# Patient Record
Sex: FEMALE | Race: WHITE | Employment: OTHER | URBAN - METROPOLITAN AREA
[De-identification: names, ages, dates, MRNs, and addresses within clinical notes are randomized per-mention and may not be internally consistent; named-entity substitution may affect disease eponyms.]

---

## 2017-03-15 ENCOUNTER — OFFICE VISIT (OUTPATIENT)
Dept: ORTHOPEDIC SURGERY | Age: 77
End: 2017-03-15

## 2017-03-15 VITALS
BODY MASS INDEX: 25.76 KG/M2 | DIASTOLIC BLOOD PRESSURE: 70 MMHG | HEIGHT: 62 IN | HEART RATE: 78 BPM | WEIGHT: 139.99 LBS | SYSTOLIC BLOOD PRESSURE: 130 MMHG

## 2017-03-15 DIAGNOSIS — M20.42 HAMMER TOE OF LEFT FOOT: ICD-10-CM

## 2017-03-15 DIAGNOSIS — L84 FOOT CALLUS: Primary | ICD-10-CM

## 2017-03-15 PROCEDURE — 11055 PARING/CUTG B9 HYPRKER LES 1: CPT | Performed by: PODIATRIST

## 2017-03-15 PROCEDURE — 99202 OFFICE O/P NEW SF 15 MIN: CPT | Performed by: PODIATRIST

## 2017-03-31 ENCOUNTER — HOSPITAL ENCOUNTER (OUTPATIENT)
Dept: MAMMOGRAPHY | Age: 77
Discharge: HOME | End: 2017-03-31
Attending: INTERNAL MEDICINE | Admitting: INTERNAL MEDICINE

## 2017-03-31 DIAGNOSIS — Z12.31 ENCOUNTER FOR SCREENING MAMMOGRAM FOR BREAST CANCER: ICD-10-CM

## 2017-05-10 ENCOUNTER — TELEPHONE (OUTPATIENT)
Dept: ORTHOPEDIC SURGERY | Age: 77
End: 2017-05-10

## 2017-05-10 ENCOUNTER — HOSPITAL ENCOUNTER (OUTPATIENT)
Dept: OTHER | Age: 77
Discharge: HOME | End: 2017-05-10
Attending: ORTHOPAEDIC SURGERY | Admitting: ORTHOPAEDIC SURGERY

## 2017-05-10 ENCOUNTER — OFFICE VISIT (OUTPATIENT)
Dept: ORTHOPEDIC SURGERY | Age: 77
End: 2017-05-10

## 2017-05-10 VITALS — HEIGHT: 62 IN | WEIGHT: 139.99 LBS | BODY MASS INDEX: 25.76 KG/M2

## 2017-05-10 DIAGNOSIS — G56.01 CARPAL TUNNEL SYNDROME OF RIGHT WRIST: ICD-10-CM

## 2017-05-10 DIAGNOSIS — M79.641 HAND PAIN, RIGHT: Primary | ICD-10-CM

## 2017-05-10 LAB
ANION GAP SERPL CALCULATED.3IONS-SCNC: 17 MMOL/L (ref 3–16)
BASOPHILS ABSOLUTE: 0 K/UL (ref 0–0.2)
BASOPHILS RELATIVE PERCENT: 0.7 %
BUN BLDV-MCNC: 19 MG/DL (ref 7–20)
CALCIUM SERPL-MCNC: 10 MG/DL (ref 8.3–10.6)
CHLORIDE BLD-SCNC: 101 MMOL/L (ref 99–110)
CO2: 23 MMOL/L (ref 21–32)
CREAT SERPL-MCNC: 0.7 MG/DL (ref 0.6–1.2)
EOSINOPHILS ABSOLUTE: 0.2 K/UL (ref 0–0.6)
EOSINOPHILS RELATIVE PERCENT: 2.7 %
GFR AFRICAN AMERICAN: >60
GFR NON-AFRICAN AMERICAN: >60
GLUCOSE BLD-MCNC: 99 MG/DL (ref 70–99)
HCT VFR BLD CALC: 39.5 % (ref 36–48)
HEMOGLOBIN: 13 G/DL (ref 12–16)
LYMPHOCYTES ABSOLUTE: 2.5 K/UL (ref 1–5.1)
LYMPHOCYTES RELATIVE PERCENT: 35.8 %
MCH RBC QN AUTO: 29.8 PG (ref 26–34)
MCHC RBC AUTO-ENTMCNC: 32.8 G/DL (ref 31–36)
MCV RBC AUTO: 91 FL (ref 80–100)
MONOCYTES ABSOLUTE: 0.5 K/UL (ref 0–1.3)
MONOCYTES RELATIVE PERCENT: 7.6 %
NEUTROPHILS ABSOLUTE: 3.7 K/UL (ref 1.7–7.7)
NEUTROPHILS RELATIVE PERCENT: 53.2 %
PDW BLD-RTO: 13.6 % (ref 12.4–15.4)
PLATELET # BLD: 206 K/UL (ref 135–450)
PMV BLD AUTO: 8.9 FL (ref 5–10.5)
POTASSIUM SERPL-SCNC: 4.3 MMOL/L (ref 3.5–5.1)
RBC # BLD: 4.35 M/UL (ref 4–5.2)
SODIUM BLD-SCNC: 141 MMOL/L (ref 136–145)
WBC # BLD: 7 K/UL (ref 4–11)

## 2017-05-10 PROCEDURE — 99213 OFFICE O/P EST LOW 20 MIN: CPT | Performed by: ORTHOPAEDIC SURGERY

## 2017-05-10 PROCEDURE — 73130 X-RAY EXAM OF HAND: CPT | Performed by: ORTHOPAEDIC SURGERY

## 2017-05-11 ENCOUNTER — PAT TELEPHONE (OUTPATIENT)
Dept: PREADMISSION TESTING | Age: 77
End: 2017-05-11

## 2017-05-15 ENCOUNTER — HOSPITAL ENCOUNTER (OUTPATIENT)
Dept: SURGERY | Age: 77
Discharge: HOME | End: 2017-05-15
Attending: ORTHOPAEDIC SURGERY | Admitting: ORTHOPAEDIC SURGERY

## 2017-05-15 VITALS
SYSTOLIC BLOOD PRESSURE: 127 MMHG | TEMPERATURE: 98 F | HEART RATE: 68 BPM | DIASTOLIC BLOOD PRESSURE: 72 MMHG | OXYGEN SATURATION: 98 % | RESPIRATION RATE: 16 BRPM

## 2017-05-15 LAB
GLUCOSE BLD-MCNC: 124 MG/DL (ref 70–99)
PERFORMED ON: ABNORMAL

## 2017-05-15 RX ORDER — SODIUM CHLORIDE, SODIUM LACTATE, POTASSIUM CHLORIDE, CALCIUM CHLORIDE 600; 310; 30; 20 MG/100ML; MG/100ML; MG/100ML; MG/100ML
INJECTION, SOLUTION INTRAVENOUS CONTINUOUS
Status: DISCONTINUED | OUTPATIENT
Start: 2017-05-15 | End: 2017-05-16 | Stop reason: HOSPADM

## 2017-05-15 RX ORDER — MORPHINE SULFATE 10 MG/ML
1 INJECTION, SOLUTION INTRAMUSCULAR; INTRAVENOUS EVERY 5 MIN PRN
Status: DISCONTINUED | OUTPATIENT
Start: 2017-05-15 | End: 2017-05-16 | Stop reason: HOSPADM

## 2017-05-15 RX ORDER — HYDRALAZINE HYDROCHLORIDE 20 MG/ML
5 INJECTION INTRAMUSCULAR; INTRAVENOUS
Status: DISCONTINUED | OUTPATIENT
Start: 2017-05-15 | End: 2017-05-16 | Stop reason: HOSPADM

## 2017-05-15 RX ORDER — OXYCODONE HYDROCHLORIDE AND ACETAMINOPHEN 5; 325 MG/1; MG/1
2 TABLET ORAL PRN
Status: ACTIVE | OUTPATIENT
Start: 2017-05-15 | End: 2017-05-15

## 2017-05-15 RX ORDER — SODIUM CHLORIDE 0.9 % (FLUSH) 0.9 %
10 SYRINGE (ML) INJECTION EVERY 12 HOURS SCHEDULED
Status: DISCONTINUED | OUTPATIENT
Start: 2017-05-15 | End: 2017-05-16 | Stop reason: HOSPADM

## 2017-05-15 RX ORDER — OXYCODONE HYDROCHLORIDE AND ACETAMINOPHEN 5; 325 MG/1; MG/1
1 TABLET ORAL PRN
Status: ACTIVE | OUTPATIENT
Start: 2017-05-15 | End: 2017-05-15

## 2017-05-15 RX ORDER — HYDROMORPHONE HCL 110MG/55ML
0.25 PATIENT CONTROLLED ANALGESIA SYRINGE INTRAVENOUS EVERY 5 MIN PRN
Status: DISCONTINUED | OUTPATIENT
Start: 2017-05-15 | End: 2017-05-16 | Stop reason: HOSPADM

## 2017-05-15 RX ORDER — DIPHENHYDRAMINE HYDROCHLORIDE 50 MG/ML
12.5 INJECTION INTRAMUSCULAR; INTRAVENOUS
Status: ACTIVE | OUTPATIENT
Start: 2017-05-15 | End: 2017-05-15

## 2017-05-15 RX ORDER — SODIUM CHLORIDE 0.9 % (FLUSH) 0.9 %
10 SYRINGE (ML) INJECTION PRN
Status: DISCONTINUED | OUTPATIENT
Start: 2017-05-15 | End: 2017-05-16 | Stop reason: HOSPADM

## 2017-05-15 RX ORDER — MORPHINE SULFATE 10 MG/ML
2 INJECTION, SOLUTION INTRAMUSCULAR; INTRAVENOUS EVERY 5 MIN PRN
Status: DISCONTINUED | OUTPATIENT
Start: 2017-05-15 | End: 2017-05-16 | Stop reason: HOSPADM

## 2017-05-15 RX ORDER — ONDANSETRON 2 MG/ML
4 INJECTION INTRAMUSCULAR; INTRAVENOUS
Status: ACTIVE | OUTPATIENT
Start: 2017-05-15 | End: 2017-05-15

## 2017-05-15 RX ORDER — HYDROCODONE BITARTRATE AND ACETAMINOPHEN 5; 325 MG/1; MG/1
1 TABLET ORAL EVERY 8 HOURS PRN
Qty: 20 TABLET | Refills: 0 | Status: SHIPPED | OUTPATIENT
Start: 2017-05-15 | End: 2017-05-22

## 2017-05-15 RX ORDER — HYDROMORPHONE HCL 110MG/55ML
0.5 PATIENT CONTROLLED ANALGESIA SYRINGE INTRAVENOUS EVERY 5 MIN PRN
Status: DISCONTINUED | OUTPATIENT
Start: 2017-05-15 | End: 2017-05-16 | Stop reason: HOSPADM

## 2017-05-15 RX ORDER — MEPERIDINE HYDROCHLORIDE 25 MG/ML
12.5 INJECTION INTRAMUSCULAR; INTRAVENOUS; SUBCUTANEOUS EVERY 5 MIN PRN
Status: DISCONTINUED | OUTPATIENT
Start: 2017-05-15 | End: 2017-05-16 | Stop reason: HOSPADM

## 2017-05-15 RX ORDER — LABETALOL HYDROCHLORIDE 5 MG/ML
5 INJECTION, SOLUTION INTRAVENOUS EVERY 10 MIN PRN
Status: DISCONTINUED | OUTPATIENT
Start: 2017-05-15 | End: 2017-05-16 | Stop reason: HOSPADM

## 2017-05-15 RX ORDER — LIDOCAINE HYDROCHLORIDE 10 MG/ML
1 INJECTION, SOLUTION EPIDURAL; INFILTRATION; INTRACAUDAL; PERINEURAL
Status: COMPLETED | OUTPATIENT
Start: 2017-05-15 | End: 2017-05-15

## 2017-05-15 RX ADMIN — SODIUM CHLORIDE, SODIUM LACTATE, POTASSIUM CHLORIDE, CALCIUM CHLORIDE: 600; 310; 30; 20 INJECTION, SOLUTION INTRAVENOUS at 06:28

## 2017-05-15 RX ADMIN — LIDOCAINE HYDROCHLORIDE 0.1 ML: 10 INJECTION, SOLUTION EPIDURAL; INFILTRATION; INTRACAUDAL; PERINEURAL at 06:27

## 2017-05-15 ASSESSMENT — PAIN SCALES - GENERAL: PAINLEVEL_OUTOF10: 0

## 2017-05-15 ASSESSMENT — PAIN DESCRIPTION - DESCRIPTORS: DESCRIPTORS: ACHING;CONSTANT

## 2017-05-15 ASSESSMENT — PAIN - FUNCTIONAL ASSESSMENT: PAIN_FUNCTIONAL_ASSESSMENT: 0-10

## 2017-05-24 ENCOUNTER — OFFICE VISIT (OUTPATIENT)
Dept: ORTHOPEDIC SURGERY | Age: 77
End: 2017-05-24

## 2017-05-24 VITALS — HEIGHT: 62 IN | BODY MASS INDEX: 25.76 KG/M2 | WEIGHT: 139.99 LBS

## 2017-05-24 DIAGNOSIS — G56.01 CARPAL TUNNEL SYNDROME OF RIGHT WRIST: Primary | ICD-10-CM

## 2017-05-24 PROCEDURE — 99024 POSTOP FOLLOW-UP VISIT: CPT | Performed by: ORTHOPAEDIC SURGERY

## 2018-04-03 ENCOUNTER — HOSPITAL ENCOUNTER (OUTPATIENT)
Dept: MAMMOGRAPHY | Age: 78
Discharge: HOME | End: 2018-04-03
Attending: INTERNAL MEDICINE | Admitting: INTERNAL MEDICINE

## 2018-04-03 DIAGNOSIS — Z12.31 ENCOUNTER FOR SCREENING MAMMOGRAM FOR BREAST CANCER: ICD-10-CM

## 2018-11-14 ENCOUNTER — APPOINTMENT (OUTPATIENT)
Dept: GENERAL RADIOLOGY | Age: 78
End: 2018-11-14
Payer: MEDICARE

## 2018-11-14 ENCOUNTER — HOSPITAL ENCOUNTER (EMERGENCY)
Age: 78
Discharge: HOME | End: 2018-11-14
Attending: EMERGENCY MEDICINE
Payer: MEDICARE

## 2018-11-14 VITALS
RESPIRATION RATE: 18 BRPM | TEMPERATURE: 97.6 F | WEIGHT: 142 LBS | BODY MASS INDEX: 26.13 KG/M2 | SYSTOLIC BLOOD PRESSURE: 150 MMHG | HEIGHT: 62 IN | OXYGEN SATURATION: 100 % | DIASTOLIC BLOOD PRESSURE: 75 MMHG | HEART RATE: 68 BPM

## 2018-11-14 DIAGNOSIS — M54.12 CERVICAL RADICULAR PAIN: Primary | ICD-10-CM

## 2018-11-14 LAB
A/G RATIO: 1.5 (ref 1.1–2.2)
ALBUMIN SERPL-MCNC: 4.4 G/DL (ref 3.4–5)
ALP BLD-CCNC: 76 U/L (ref 40–129)
ALT SERPL-CCNC: 17 U/L (ref 10–40)
ANION GAP SERPL CALCULATED.3IONS-SCNC: 10 MMOL/L (ref 3–16)
AST SERPL-CCNC: 16 U/L (ref 15–37)
BASOPHILS ABSOLUTE: 0.1 K/UL (ref 0–0.2)
BASOPHILS RELATIVE PERCENT: 1 %
BILIRUB SERPL-MCNC: 0.5 MG/DL (ref 0–1)
BUN BLDV-MCNC: 17 MG/DL (ref 7–20)
CALCIUM SERPL-MCNC: 9.6 MG/DL (ref 8.3–10.6)
CHLORIDE BLD-SCNC: 100 MMOL/L (ref 99–110)
CO2: 28 MMOL/L (ref 21–32)
CREAT SERPL-MCNC: 0.6 MG/DL (ref 0.6–1.2)
EOSINOPHILS ABSOLUTE: 0.1 K/UL (ref 0–0.6)
EOSINOPHILS RELATIVE PERCENT: 1.3 %
GFR AFRICAN AMERICAN: >60
GFR NON-AFRICAN AMERICAN: >60
GLOBULIN: 2.9 G/DL
GLUCOSE BLD-MCNC: 192 MG/DL (ref 70–99)
HCT VFR BLD CALC: 42 % (ref 36–48)
HEMOGLOBIN: 14.2 G/DL (ref 12–16)
LYMPHOCYTES ABSOLUTE: 2.2 K/UL (ref 1–5.1)
LYMPHOCYTES RELATIVE PERCENT: 26 %
MCH RBC QN AUTO: 31.1 PG (ref 26–34)
MCHC RBC AUTO-ENTMCNC: 33.8 G/DL (ref 31–36)
MCV RBC AUTO: 91.9 FL (ref 80–100)
MONOCYTES ABSOLUTE: 0.5 K/UL (ref 0–1.3)
MONOCYTES RELATIVE PERCENT: 5.6 %
NEUTROPHILS ABSOLUTE: 5.5 K/UL (ref 1.7–7.7)
NEUTROPHILS RELATIVE PERCENT: 66.1 %
PDW BLD-RTO: 13.2 % (ref 12.4–15.4)
PLATELET # BLD: 243 K/UL (ref 135–450)
PMV BLD AUTO: 8.1 FL (ref 5–10.5)
POTASSIUM SERPL-SCNC: 3.7 MMOL/L (ref 3.5–5.1)
RBC # BLD: 4.57 M/UL (ref 4–5.2)
SODIUM BLD-SCNC: 138 MMOL/L (ref 136–145)
TOTAL PROTEIN: 7.3 G/DL (ref 6.4–8.2)
TROPONIN: <0.01 NG/ML
TROPONIN: <0.01 NG/ML
WBC # BLD: 8.4 K/UL (ref 4–11)

## 2018-11-14 PROCEDURE — 99284 EMERGENCY DEPT VISIT MOD MDM: CPT

## 2018-11-14 PROCEDURE — 71046 X-RAY EXAM CHEST 2 VIEWS: CPT

## 2018-11-14 PROCEDURE — 84484 ASSAY OF TROPONIN QUANT: CPT

## 2018-11-14 PROCEDURE — 85025 COMPLETE CBC W/AUTO DIFF WBC: CPT

## 2018-11-14 PROCEDURE — 72040 X-RAY EXAM NECK SPINE 2-3 VW: CPT

## 2018-11-14 PROCEDURE — 80053 COMPREHEN METABOLIC PANEL: CPT

## 2018-11-14 PROCEDURE — 93005 ELECTROCARDIOGRAM TRACING: CPT | Performed by: PHYSICIAN ASSISTANT

## 2018-11-14 NOTE — ED PROVIDER NOTES
GLUCOSE BLOOD VI TEST STRIPS (ONE TOUCH ULTRA TEST) STRIP    Check glucose daily. Diagnosis code 250.00. LISINOPRIL (PRINIVIL;ZESTRIL) 20 MG TABLET    Take 1 tablet by mouth daily    METFORMIN (GLUCOPHAGE) 500 MG TABLET    Take 1 tablet by mouth 2 times daily (with meals)    MISC NATURAL PRODUCTS (OSTEO BI-FLEX ADV DOUBLE ST PO)    Take by mouth    OMEGA-3 FATTY ACIDS (OMEGA-3 FISH OIL) 1000 MG CAPS    Take 1 capsule by mouth    OMEPRAZOLE (PRILOSEC) 20 MG CAPSULE    Take 1 capsule by mouth Daily    OXYBUTYNIN (DITROPAN) 5 MG TABLET    TAKE 1 TABLET BY MOUTH 2 TIMES DAILY    PRAMIPEXOLE (MIRAPEX) 0.125 MG TABLET    Take 1 tablet by mouth nightly    PROBIOTIC PRODUCT (PROBIOTIC ADVANCED PO)    Take by mouth    VITAMIN D (CHOLECALCIFEROL) 1000 UNIT TABS TABLET    Take 1,000 Units by mouth daily         ALLERGIES     Sulfa antibiotics; Dicyclomine hcl; Penicillins; and Sulfamethoxazole-trimethoprim [bactrim]    FAMILYHISTORY       Family History   Problem Relation Age of Onset    Diabetes Mother     Heart Disease Mother     High Blood Pressure Mother     Stroke Father     Diabetes Brother           SOCIAL HISTORY       Social History     Social History    Marital status:      Spouse name: N/A    Number of children: N/A    Years of education: N/A     Social History Main Topics    Smoking status: Never Smoker    Smokeless tobacco: Never Used    Alcohol use No    Drug use: No    Sexual activity: Yes     Other Topics Concern    None     Social History Narrative    None       SCREENINGS             PHYSICAL EXAM    (up to 7 for level 4, 8 or more for level 5)     ED Triage Vitals [11/14/18 1228]   BP Temp Temp Source Pulse Resp SpO2 Height Weight   (!) 185/99 97.6 °F (36.4 °C) Oral 87 18 100 % 5' 2\" (1.575 m) 142 lb (64.4 kg)       Physical Exam   Constitutional: She is oriented to person, place, and time. She appears well-developed and well-nourished. HENT:   Head: Normocephalic.    Right Ear:   [address],  [place], [place] [employer]   Phone [phone]   TROPONIN    Narrative:     Performed at:  [facility]) - [facility]  [address],  [place], [place]   Phone [phone]       All other labs were within normal range or not returned as of this dictation. EKG: All EKG's are interpreted by the Emergency Department Physician who either signs orCo-signs this chart in the absence of a cardiologist.  Please see their note for interpretation of EKG. RADIOLOGY:   Non-plain film images such as CT, Ultrasound and MRI are read by the radiologist. Hessie Buffy radiographic images are visualized andpreliminarily interpreted by the  ED Provider with the below findings:    Chest x-ray shows no acute cardio pulmonary process. X-ray cervical spine shows anterior bridging osteophyte formation at C5-C6 through C7-T1. No acute pathology. Interpretation perthe Radiologist below, if available at the time of this note:    XR CHEST STANDARD (2 VW)   Final Result   No acute process. XR CERVICAL SPINE (2-3 VIEWS)   Final Result   Anterior bridging osteophytes at C5-6 through C7-T1. Pattern is chronic and   degenerative. No acute abnormality. No results found. PROCEDURES   Unless otherwise noted below, none     Procedures    CRITICAL CARE TIME   N/A    CONSULTS:  None      EMERGENCY DEPARTMENT COURSE and DIFFERENTIALDIAGNOSIS/MDM:   Vitals:    Vitals:    11/14/18 1433 11/14/18 1550 11/14/18 1649 11/14/18 1717   BP: (!) 156/81 (!) 146/80  (!) 150/75   Pulse: 71 83 69 68   Resp: 18 18 18 18   Temp:       TempSrc:       SpO2: 100% 100% 100% 100%   Weight:       Height:           Patient was given thefollowing medications:  Medications - No data to display    At 4 PM I did place repeat order for EKG and troponin. Case discussed with attending physician at that time.     The patient presented with three-day history of improving left neck, left shoulder and left arm pain without

## 2018-11-15 LAB
EKG ATRIAL RATE: 70 BPM
EKG ATRIAL RATE: 70 BPM
EKG DIAGNOSIS: NORMAL
EKG DIAGNOSIS: NORMAL
EKG P AXIS: 61 DEGREES
EKG P AXIS: 73 DEGREES
EKG P-R INTERVAL: 162 MS
EKG P-R INTERVAL: 166 MS
EKG Q-T INTERVAL: 380 MS
EKG Q-T INTERVAL: 396 MS
EKG QRS DURATION: 66 MS
EKG QRS DURATION: 84 MS
EKG QTC CALCULATION (BAZETT): 410 MS
EKG QTC CALCULATION (BAZETT): 427 MS
EKG R AXIS: 19 DEGREES
EKG R AXIS: 20 DEGREES
EKG T AXIS: 29 DEGREES
EKG T AXIS: 45 DEGREES
EKG VENTRICULAR RATE: 70 BPM
EKG VENTRICULAR RATE: 70 BPM

## 2018-11-15 PROCEDURE — 93010 ELECTROCARDIOGRAM REPORT: CPT | Performed by: INTERNAL MEDICINE

## 2018-11-15 NOTE — ED PROVIDER NOTES
Emergency Department Provider Note     Location: [facility]  ED  11/14/2018    I independently performed a history and physical on Lindsey Godoy. All diagnostic, treatment, and disposition decisions were made by myself in conjunction with the mid-level provider. Briefly, this is a 70 y.o. female here for slight neck pain with numbness/tingling in the left arm. Symptoms started Sunday and then started to improve. No pain now, just some residual \"sensation\" in the left arm. ED Triage Vitals [11/14/18 1228]   BP Temp Temp Source Pulse Resp SpO2 Height Weight   (!) 185/99 97.6 °F (36.4 °C) Oral 87 18 100 % 5' 2\" (1.575 m) 142 lb (64.4 kg)        Exam showed WDWN female NAD, ACOx3, heart RRR, no murmur, lungs clear, no LE edema. Good ROM and motor strength of bilateral arms. Sensation intact. Radial pulses intact. EKG  The Ekg interpreted by me in the absence of a cardiologist shows. normal sinus rhythm with a rate of 70  Axis is   Normal  QTc is  normal  Intervals and Durations are unremarkable. No specific ST-T wave changes appreciated. No evidence of acute ischemia. No significant change from prior EKG dated 7/29/17    EKG#2  The Ekg interpreted by me in the absence of a cardiologist shows. normal sinus rhythm with a rate of 70  Axis is   Normal  QTc is  normal  Intervals and Durations are unremarkable. No specific ST-T wave changes appreciated. No evidence of acute ischemia. No significant change from prior EKG dated earlier today     Lab - no acute abnormality that needs to be addressed, including negative trop x 2        X-RAYS:  I have reviewed radiologic plain film image(s). ALL OTHER NON-PLAIN FILM IMAGES SUCH AS CT, ULTRASOUND AND MRI HAVE BEEN READ BY THE RADIOLOGIST. XR CHEST STANDARD (2 VW)   Final Result   No acute process. XR CERVICAL SPINE (2-3 VIEWS)   Final Result   Anterior bridging osteophytes at C5-6 through C7-T1.   Pattern is chronic and

## 2018-11-28 ENCOUNTER — OFFICE VISIT (OUTPATIENT)
Dept: ORTHOPEDIC SURGERY | Age: 78
End: 2018-11-28
Payer: MEDICARE

## 2018-11-28 VITALS — RESPIRATION RATE: 12 BRPM | WEIGHT: 141.98 LBS | HEIGHT: 62 IN | BODY MASS INDEX: 26.13 KG/M2

## 2018-11-28 DIAGNOSIS — M17.12 PRIMARY OSTEOARTHRITIS OF LEFT KNEE: Primary | ICD-10-CM

## 2018-11-28 DIAGNOSIS — M25.562 ACUTE PAIN OF LEFT KNEE: ICD-10-CM

## 2018-11-28 PROCEDURE — 99214 OFFICE O/P EST MOD 30 MIN: CPT | Performed by: ORTHOPAEDIC SURGERY

## 2018-11-28 NOTE — PROGRESS NOTES
children: N/A    Years of education: N/A     Occupational History    Not on file. Social History Main Topics    Smoking status: Never Smoker    Smokeless tobacco: Never Used    Alcohol use No    Drug use: No    Sexual activity: Yes     Other Topics Concern    Not on file     Social History Narrative    No narrative on file       Family HISTORY    Family History   Problem Relation Age of Onset    Diabetes Mother     Heart Disease Mother     High Blood Pressure Mother     Stroke Father     Diabetes Brother        PHYSICAL EXAM    Vital Signs:  Resp 12   Ht 5' 2.01\" (1.575 m)   Wt 141 lb 15.6 oz (64.4 kg)   LMP 06/12/1991 (Approximate)   BMI 25.96 kg/m²   General Appearance:  Normal body habitus. Alert and oriented to person, place, and time. Affect:  Normal.   Gait:  Normal. Good balance and coordination. Skin:  Intact. Sensation:  Intact. Strength:  Intact. Reflexes:  Intact. Pulses:  Intact. Knee Exam:    Effusion:  Negative    Range of Motion Right Left   Extension 0 0   Flexion 115 115     Provocative Test Right Left    Positive Negative Positive Negative   Anterior drawer [] [x] [] [x]   Lachman [] [x] [] [x]   Posterior drawer [] [x] [] [x]   Varus testing [] [x] [] [x]   Valgus testing [] [x] [] [x]   Joint line tenderness [] [x] [x] []     Additional Exam Comments:  She has minimal discomfort in the medial compartment of her left knee to direct palpation. Otherwise has good range of motion without crepitus or instability. Her neurocirculatory lymphatic exam otherwise is normal and symmetric to both lower extremities. IMAGING STUDIES    X-rays 3 views of her left knee reveals some grade 2-3 osteoarthritis mostly medial compartment of left knee    IMPRESSION    Left knee pain secondary to osteoarthritis and degenerative medial meniscus tear. Stable and minimally symptomatic this time    PLAN      1.   Conservative care options including physical therapy, NSAIDs, bracing,

## 2019-04-12 ENCOUNTER — HOSPITAL ENCOUNTER (OUTPATIENT)
Dept: MAMMOGRAPHY | Age: 79
Discharge: HOME | End: 2019-04-12
Payer: MEDICARE

## 2019-04-12 DIAGNOSIS — Z12.31 ENCOUNTER FOR SCREENING MAMMOGRAM FOR BREAST CANCER: ICD-10-CM

## 2019-04-12 PROCEDURE — 77063 BREAST TOMOSYNTHESIS BI: CPT

## 2019-06-17 ENCOUNTER — HOSPITAL ENCOUNTER (OUTPATIENT)
Dept: PHYSICAL THERAPY | Age: 79
Setting detail: THERAPIES SERIES
Discharge: HOME | End: 2019-06-17
Payer: MEDICARE

## 2019-06-17 PROCEDURE — 97140 MANUAL THERAPY 1/> REGIONS: CPT

## 2019-06-17 PROCEDURE — 97110 THERAPEUTIC EXERCISES: CPT

## 2019-06-17 PROCEDURE — 97161 PT EVAL LOW COMPLEX 20 MIN: CPT

## 2019-06-17 NOTE — PROGRESS NOTES
4- /5   5/5   Middle third anterior thigh (L3)   Hip adduction (L3)    /5    /5   Patella and med malleolus (L4)   Knee extension (L3,4)   5 /5  5  /5   Fibular head and dorsum of foot (L5)   Ankle dorsiflexion (L4)   5 /5  5  /5   Lateral side and plantar surface of foot (S1)   Hip abduction (L5)    /5    /5   Medial aspect of posterior thigh (S2)   Great toe extension (L5)  5  /5  5  /5   Perianal area (S3,4)   Knee flexion (L5,S1) 4 5      Ankle plantarflexion (S1,2)   4 /5    4/5           Comments:      Reflexes/Trunk Strength     Reflex Left Right Strength Strength   Quadriceps (L3,4) 2 2 Trunk Extensors 4/5   Achilles (S1,2) 2 2 Gluteals L 4-/5   R 4/5   Biceps (C5,6)   Abdominals                                           Flexibility     Obers:         Hip flexors/Aureliano: decr bilat   Hamstrings:   Left -47    Right -40        Gastrocs:    Other:      Palpation     Patient reported tenderness over bilat grt troch   Noted warmth _ neg LB  Noted increased muscle tone- neg in LB   Ligaments - neg LB    Special Tests     Lumbar Special Test Findings SI Special Test Findings   Straight Leg Raise neg Sit up Test/Long sit test    Crams neg 90/90 Test     Dural Tension/Slump test  Oscillation    Distraction  Ant/Post Rot Prov    Compression  SI distraction      SI compression      Prone knee bend test      Sacral thrust tests        Specific Mobility Testing     Lumbar:         Other:         Functional Outcome Measure    Measure used:  LEFS  Score:  72  % Disability:  10%      ASSESSMENT     Presents with a long hx of left knee arthritis, leg cramping, and left leg pain. Objectively she is doing much better the past 3 days. She does have some left leg weakness and lacks LE flexibility bilat. PT should be helpful. Problems            Decreased trunk strength   Decreased LE strength   Increased pain   Decreased flexibility        Rehabilitation Potential:  Good for goals listed below.   Strengths for achieving goals include:  motivated  Limitations for achieving goals include: perhaps her problem is related to her lipitor. Prognosis: [x]    Good []    Fair []    Poor    GOALS  GCode:     Short Term Goals (  2  weeks) Long Term Goals ( 4  weeks)   1). Establish HEP 1). (I) HEP   2). start to improve flexibility 2).hamstrings to -35. Improve hip flx   3). 3).gluteals to 4+    hamstrings to 4+   4). 4).improved sleeping. 5). 5).decr pain 50%   6). 6). PLAN OF CARE     To see patient  2 x/week for  4 weeks for the following treatment interventions:     Therapeutic Exercise   Progressive Resistive Exercise   Modalities of Choice (Heat/Cold/US/EStim/Ionto)   Home Exercise Program   Manual Techniques/Mobilization     Thank you for the referral of this patient.       Timed Code Treatment Minutes:   45  minutes      Total Treatment Time: [id number]

## 2019-06-17 NOTE — FLOWSHEET NOTE
for 20 yrs.     Patient goal for therapy:   Pain relief and to sleep better.     Exercises:   Exercise/Equipment Resistance/Repetitions Other comments     19 explained course and role of PT       Prone:  Hamstring curls 3x5    incr up to 3x10 An active stretch for hip flx. tightness                  Hip ext 3x5   For glut weakness      Supine hip flx stretch with leg of side of bed Hold 10 sec x 5         bilat      Seated hamstring stretch Hold 20 sec x 5         bilat                                                                                                           Therapeutic Exercise:  30 min  Group Therapy:      Home Exercise Program:  givenHO    Therapeutic Activity:      Neuromuscular Re-education:      Gait:      Manual Therapy:  15 min left knee distraction at 20 and 90    Canalith Repositioning Procedure:       Modalities:      Timed Code Treatment Minutes:  45    Total Treatment Minutes:  75    Medicare Cap Total YTD:  na    Treatment/Activity Tolerance:    [x] Patient tolerated treatment well [] Patient limited by fatigue   [] Patient limited by pain [] Patient limited by other medical complications   [] Other:     Prognosis: [x] Good [] Fair  [] Poor    Patient Requires Follow-up:  [x] Yes  [] No    Plan: [] Continue per plan of care [] Alter current plan (see comments)   [x] Plan of care initiated [] Hold pending MD visit [] Discharge    Plan for Next Session:  Check on HEP, left knee disstraction.   Check piriformis,gastroc  flexibility and add stretch if needed,      See Progress Note: [x] Yes  [] No       Next due:   19      Electronically signed by:  Grace Eason, PT [id number]    Outpatient Physical Therapy  Progress Note    Date:  2019    Patient Name:  Demarco Sutherland      :  1940    Restrictions/Precautions:      Diagnosis:      Treatment Diagnosis:      Insurance/Certification information:      Referring Physician:      Plan of care signed (Y/N):      Visit# / total visits: /    Pain level: /10    Progress Note covers period from: To date on this note.       Subjective:         Objective:  Observation:  Test measurements:       GCODEs and Functional Outcome Measure:            Assessment:  Summary:   Patient's response to treatment:      Goals:  · Progress toward previous goals:      Plan:  ·     Electronically Signed by: Susi Rose PT

## 2019-06-19 ENCOUNTER — HOSPITAL ENCOUNTER (OUTPATIENT)
Dept: PHYSICAL THERAPY | Age: 79
Setting detail: THERAPIES SERIES
Discharge: HOME | End: 2019-06-19
Payer: MEDICARE

## 2019-06-19 PROCEDURE — 97140 MANUAL THERAPY 1/> REGIONS: CPT

## 2019-06-19 PROCEDURE — 97110 THERAPEUTIC EXERCISES: CPT

## 2019-06-19 NOTE — FLOWSHEET NOTE
Outpatient Physical Therapy     [x] Daily Treatment Note   [] Progress Note   [] Discharge Note      Date:  2019    Patient Name:  Елена Fair        :  1940    Restrictions/Precautions:      Diagnosis:  Leg cramps. Piriformis syndrome. Treatment Diagnosis:  Same. Leg pain. [facility]    $[facility] co pay    Referring Physician:  Dr. Lashay Henley of care signed (Y/N):      Visit# / total visits:      Pain level: Currently 0/10     Subjective:    19  Reports both legs were sore Monday night but not painful today 19  Reports about 3 weeks ago her leg was cramping and hurting and she had to get out of bed to walk on it, when she did she immediately fell and landed on her buttocks, her  had to help her up at 3 AM.  She has some left buttock pain when she pushes on it- sort of a stinging type pain but only with pushing on it- not with standing,walking, chores, stairs. She has had this left buttock pain for many years. .  She does have some arthritis in her left knee. She had been having L knee pain and lower L leg pain and cramping sometimes to her toes. She saw Dr. Harsh López in 2018 but did not receive an injection. Sometimes has left hamstring cramping. Last thurs evening when she went to [employer] she had a very good feeling and since then her left leg has not hurt, she has not had difficulty with it at night. States at night she typically is careful how she moves her legs in bed due to them cramping.     Pain       Patient describes pain to be zero  Patient reports   0 /10 pain at rest,  0/10 pain with activity. Worsened by    Improved by - [employer] seemed to help. Pt. reports pain with coughing, sneezing and laughing:  O  Pt. reports bowel and bladder changes: NO   Current Functional Limitations:  none   PLOF:  Had been having some difficulty for the past 3 weeks. Pt's sleep is affected?    It has been in the past but has done well in the past 3 nights. Sleeps with a pillow under her knees   She has for 20 yrs.     Patient goal for therapy:   Pain relief and to sleep better.     Exercises:   Exercise/Equipment Resistance/Repetitions Other comments     19 explained course and role of PT       Prone:  Hamstring curls 3x5    incr up to 3x10 An active stretch for hip flx. tightness                  Hip ext 3x5   For glut weakness    Supine hip flx stretch with leg of side of bed Hold 10 sec x 5         bilat Hamstring cramps bilat today   Seated hamstring stretch Hold 20 sec x 5         bilat    Standing gastroc stretch   3-5 x 20-30\"                                                                                                    Therapeutic Exercise:  15 min  Supine bilateral DF 0 degrees    Group Therapy:      Home Exercise Program:  givenHO    Therapeutic Activity:      Neuromuscular Re-education:      Gait:      Manual Therapy:  15 min left knee distraction at 20 and 90    Modalities:      Timed Code Treatment Minutes: 30    Total Treatment Minutes:  27   1 man 1 ex    [id number]  Medicare Cap Total YTD:  na    Treatment/Activity Tolerance:    [x] Patient tolerated treatment well [] Patient limited by fatigue   [] Patient limited by pain [] Patient limited by other medical complications   [] Other:     Prognosis: [x] Good [] Fair  [] Poor    Patient Requires Follow-up:  [x] Yes  [] No    Plan: [x] Continue per plan of care [] Alter current plan (see comments)   [] Plan of care initiated [] Hold pending MD visit [] Discharge    Plan for Next Session:  Check on HEP, left knee disstraction.   Check piriformis flexibility and add stretch if needed,      See Progress Note: [] Yes  [x] No       Next due:   19      Electronically signed by:  Jaylene Bralow, [id number]    Outpatient Physical Therapy  Progress Note    Date:  2019    Patient Name:  Jermaine Silverio      :  1940    Restrictions/Precautions:      Diagnosis: Treatment Diagnosis:      Insurance/Certification information:      Referring Physician:      Plan of care signed (Y/N):      Visit# / total visits:  /    Pain level: /10    Progress Note covers period from: To date on this note.       Subjective:         Objective:  Observation:  Test measurements:       GCODEs and Functional Outcome Measure:            Assessment:  Summary:   Patient's response to treatment:      Goals:  · Progress toward previous goals:      Plan:  ·     Electronically Signed by: Natividad Osgood, PT

## 2019-06-24 ENCOUNTER — HOSPITAL ENCOUNTER (OUTPATIENT)
Dept: PHYSICAL THERAPY | Age: 79
Setting detail: THERAPIES SERIES
Discharge: HOME | End: 2019-06-24
Payer: MEDICARE

## 2019-06-24 PROCEDURE — 97110 THERAPEUTIC EXERCISES: CPT

## 2019-06-24 PROCEDURE — 97140 MANUAL THERAPY 1/> REGIONS: CPT

## 2019-06-24 NOTE — FLOWSHEET NOTE
Outpatient Physical Therapy     [x] Daily Treatment Note   [] Progress Note   [] Discharge Note      Date:  2019    Patient Name:  Ana Orlando        :  1940    Restrictions/Precautions:      Diagnosis:  Leg cramps. Piriformis syndrome. Treatment Diagnosis:  Same. Leg pain. [facility]ification [facility]    $[facility] co pay    Referring Physician:  Dr. Julio César Montero of care signed (Y/N):      Visit# / total visits:  3/8    Pain level: Currently 0/10     Subjective:  19  Patient reports a little cramping in legs since last visit, but it hasn't been too bad. Patient sleep through the night without waking due to pain or cramps last night. 19  Reports both legs were sore Monday night but not painful today 19  Reports about 3 weeks ago her leg was cramping and hurting and she had to get out of bed to walk on it, when she did she immediately fell and landed on her buttocks, her  had to help her up at 3 AM.  She has some left buttock pain when she pushes on it- sort of a stinging type pain but only with pushing on it- not with standing,walking, chores, stairs. She has had this left buttock pain for many years. .  She does have some arthritis in her left knee. She had been having L knee pain and lower L leg pain and cramping sometimes to her toes. She saw Dr. Constantin Simmons in 2018 but did not receive an injection. Sometimes has left hamstring cramping. Last thurs evening when she went to [employer] she had a very good feeling and since then her left leg has not hurt, she has not had difficulty with it at night. States at night she typically is careful how she moves her legs in bed due to them cramping.     Pain       Patient describes pain to be zero  Patient reports   0 /10 pain at rest,  0/10 pain with activity. Worsened by    Improved by - [employer] seemed to help.   Pt. reports pain with coughing, sneezing and laughing:  O  Pt. reports bowel and bladder changes: NO   Current Functional Limitations:  none   PLOF:  Had been having some difficulty for the past 3 weeks. Pt's sleep is affected? It has been in the past but has done well in the past 3 nights. Sleeps with a pillow under her knees   She has for 20 yrs.     Patient goal for therapy:   Pain relief and to sleep better.     Exercises:   Exercise/Equipment Resistance/Repetitions Other comments     6/17/19 explained course and role of PT       Prone:  Hamstring curls 3x5    incr up to 3x10 An active stretch for hip flx. tightness                  Hip ext 3x5   For glut weakness    Supine hip flx stretch with leg of side of bed Hold 10 sec x 5         bilat Hamstring cramps bilat today   Seated hamstring stretch Hold 20 sec x 5         bilat    Standing gastroc stretch   3-5 x 20-30\"     nustep  L5, x7 min, seat 9 PT present to obtain subjective info    Forward step ups  4\", 2x15 Bilat     Lateral step ups  4\" x30 bilat       Backward step ups  2\" 2x15 bilat       Heel toe raises x20      Posterior squats x20      Standing high marches x30      Standing hip ext x20      Standing hip abd x20                                  Therapeutic Exercise:  30 min. Progressed exercises as noted above with new handout given. Group Therapy:      Home Exercise Program:  givenHO    Therapeutic Activity:      Neuromuscular Re-education:      Gait:      Manual Therapy:  10 minManual stretching of bilateral hamstrings and piriformis with patient in supine. Modalities:      Timed Code Treatment Minutes: 40    Total Treatment Minutes:  40   2 ex, 1 manual      Medicare Cap Total YTD:  na    Treatment/Activity Tolerance:    [x] Patient tolerated treatment well with no adverse reactions noted [] Patient limited by fatigue   [] Patient limited by pain [] Patient limited by other medical complications   [x] Other:  Pain remained 0/10 after treatment.     Prognosis: [x] Good [] Fair  [] Poor    Patient Requires

## 2019-06-26 ENCOUNTER — APPOINTMENT (OUTPATIENT)
Dept: PHYSICAL THERAPY | Age: 79
End: 2019-06-26
Payer: MEDICARE

## 2020-01-02 ENCOUNTER — HOSPITAL ENCOUNTER (EMERGENCY)
Age: 80
Discharge: HOME | End: 2020-01-02
Attending: EMERGENCY MEDICINE
Payer: MEDICARE

## 2020-01-02 VITALS
OXYGEN SATURATION: 98 % | HEART RATE: 70 BPM | DIASTOLIC BLOOD PRESSURE: 61 MMHG | HEIGHT: 62 IN | RESPIRATION RATE: 16 BRPM | SYSTOLIC BLOOD PRESSURE: 114 MMHG | TEMPERATURE: 98.3 F | WEIGHT: 140 LBS | BODY MASS INDEX: 25.76 KG/M2

## 2020-01-02 LAB
A/G RATIO: 1.2 (ref 1.1–2.2)
ALBUMIN SERPL-MCNC: 4.1 G/DL (ref 3.4–5)
ALP BLD-CCNC: 74 U/L (ref 40–129)
ALT SERPL-CCNC: 12 U/L (ref 10–40)
ANION GAP SERPL CALCULATED.3IONS-SCNC: 14 MMOL/L (ref 3–16)
AST SERPL-CCNC: 15 U/L (ref 15–37)
BASOPHILS ABSOLUTE: 0.1 K/UL (ref 0–0.2)
BASOPHILS RELATIVE PERCENT: 0.9 %
BILIRUB SERPL-MCNC: 0.5 MG/DL (ref 0–1)
BILIRUBIN URINE: NEGATIVE
BLOOD, URINE: NEGATIVE
BUN BLDV-MCNC: 19 MG/DL (ref 7–20)
CALCIUM SERPL-MCNC: 9.9 MG/DL (ref 8.3–10.6)
CHLORIDE BLD-SCNC: 99 MMOL/L (ref 99–110)
CLARITY: CLEAR
CO2: 25 MMOL/L (ref 21–32)
COLOR: YELLOW
CREAT SERPL-MCNC: 0.9 MG/DL (ref 0.6–1.2)
EKG ATRIAL RATE: 80 BPM
EKG DIAGNOSIS: NORMAL
EKG P AXIS: 82 DEGREES
EKG P-R INTERVAL: 172 MS
EKG Q-T INTERVAL: 362 MS
EKG QRS DURATION: 68 MS
EKG QTC CALCULATION (BAZETT): 417 MS
EKG R AXIS: 74 DEGREES
EKG T AXIS: 75 DEGREES
EKG VENTRICULAR RATE: 80 BPM
EOSINOPHILS ABSOLUTE: 0.2 K/UL (ref 0–0.6)
EOSINOPHILS RELATIVE PERCENT: 3 %
GFR AFRICAN AMERICAN: >60
GFR NON-AFRICAN AMERICAN: >60
GLOBULIN: 3.4 G/DL
GLUCOSE BLD-MCNC: 139 MG/DL (ref 70–99)
GLUCOSE URINE: NEGATIVE MG/DL
HCT VFR BLD CALC: 38.4 % (ref 36–48)
HEMOGLOBIN: 12.8 G/DL (ref 12–16)
KETONES, URINE: NEGATIVE MG/DL
LEUKOCYTE ESTERASE, URINE: NEGATIVE
LYMPHOCYTES ABSOLUTE: 2.3 K/UL (ref 1–5.1)
LYMPHOCYTES RELATIVE PERCENT: 29.6 %
MCH RBC QN AUTO: 31.3 PG (ref 26–34)
MCHC RBC AUTO-ENTMCNC: 33.3 G/DL (ref 31–36)
MCV RBC AUTO: 93.9 FL (ref 80–100)
MICROSCOPIC EXAMINATION: NORMAL
MONOCYTES ABSOLUTE: 0.7 K/UL (ref 0–1.3)
MONOCYTES RELATIVE PERCENT: 9.3 %
NEUTROPHILS ABSOLUTE: 4.5 K/UL (ref 1.7–7.7)
NEUTROPHILS RELATIVE PERCENT: 57.2 %
NITRITE, URINE: NEGATIVE
PDW BLD-RTO: 13.5 % (ref 12.4–15.4)
PH UA: 6.5 (ref 5–8)
PLATELET # BLD: 235 K/UL (ref 135–450)
PMV BLD AUTO: 7.7 FL (ref 5–10.5)
POTASSIUM REFLEX MAGNESIUM: 3.7 MMOL/L (ref 3.5–5.1)
PROTEIN UA: NEGATIVE MG/DL
RBC # BLD: 4.09 M/UL (ref 4–5.2)
SODIUM BLD-SCNC: 138 MMOL/L (ref 136–145)
SPECIFIC GRAVITY UA: <=1.005 (ref 1–1.03)
TOTAL PROTEIN: 7.5 G/DL (ref 6.4–8.2)
TROPONIN: <0.01 NG/ML
URINE REFLEX TO CULTURE: NORMAL
URINE TYPE: NORMAL
UROBILINOGEN, URINE: 0.2 E.U./DL
WBC # BLD: 7.9 K/UL (ref 4–11)

## 2020-01-02 PROCEDURE — 85025 COMPLETE CBC W/AUTO DIFF WBC: CPT

## 2020-01-02 PROCEDURE — 93010 ELECTROCARDIOGRAM REPORT: CPT | Performed by: INTERNAL MEDICINE

## 2020-01-02 PROCEDURE — 99284 EMERGENCY DEPT VISIT MOD MDM: CPT

## 2020-01-02 PROCEDURE — 81003 URINALYSIS AUTO W/O SCOPE: CPT

## 2020-01-02 PROCEDURE — 36415 COLL VENOUS BLD VENIPUNCTURE: CPT

## 2020-01-02 PROCEDURE — 2580000003 HC RX 258: Performed by: EMERGENCY MEDICINE

## 2020-01-02 PROCEDURE — 96374 THER/PROPH/DIAG INJ IV PUSH: CPT

## 2020-01-02 PROCEDURE — 84484 ASSAY OF TROPONIN QUANT: CPT

## 2020-01-02 PROCEDURE — 96361 HYDRATE IV INFUSION ADD-ON: CPT

## 2020-01-02 PROCEDURE — 93005 ELECTROCARDIOGRAM TRACING: CPT | Performed by: EMERGENCY MEDICINE

## 2020-01-02 PROCEDURE — 6360000002 HC RX W HCPCS

## 2020-01-02 PROCEDURE — 80053 COMPREHEN METABOLIC PANEL: CPT

## 2020-01-02 RX ORDER — ONDANSETRON 4 MG/1
4 TABLET, ORALLY DISINTEGRATING ORAL EVERY 8 HOURS PRN
Qty: 12 TABLET | Refills: 0 | Status: ON HOLD | OUTPATIENT
Start: 2020-01-02 | End: 2021-08-16

## 2020-01-02 RX ORDER — TRIAMTERENE AND HYDROCHLOROTHIAZIDE 37.5; 25 MG/1; MG/1
1 CAPSULE ORAL
Status: ON HOLD | COMMUNITY
Start: 2019-12-26 | End: 2021-08-16

## 2020-01-02 RX ORDER — AZELASTINE 1 MG/ML
2 SPRAY, METERED NASAL
COMMUNITY
Start: 2019-10-30 | End: 2022-02-09

## 2020-01-02 RX ORDER — ONDANSETRON 2 MG/ML
4 INJECTION INTRAMUSCULAR; INTRAVENOUS ONCE
Status: COMPLETED | OUTPATIENT
Start: 2020-01-02 | End: 2020-01-02

## 2020-01-02 RX ORDER — ONDANSETRON 2 MG/ML
INJECTION INTRAMUSCULAR; INTRAVENOUS
Status: COMPLETED
Start: 2020-01-02 | End: 2020-01-02

## 2020-01-02 RX ORDER — 0.9 % SODIUM CHLORIDE 0.9 %
1000 INTRAVENOUS SOLUTION INTRAVENOUS ONCE
Status: COMPLETED | OUTPATIENT
Start: 2020-01-02 | End: 2020-01-02

## 2020-01-02 RX ADMIN — ONDANSETRON 4 MG: 2 INJECTION INTRAMUSCULAR; INTRAVENOUS at 03:04

## 2020-01-02 RX ADMIN — ONDANSETRON HYDROCHLORIDE 4 MG: 2 INJECTION, SOLUTION INTRAMUSCULAR; INTRAVENOUS at 03:04

## 2020-01-02 RX ADMIN — SODIUM CHLORIDE 1000 ML: 9 INJECTION, SOLUTION INTRAVENOUS at 03:04

## 2020-01-02 NOTE — ED PROVIDER NOTES
CHIEF COMPLAINT  Loss of Consciousness      HISTORY OF PRESENT ILLNESS  Sarwat Munoz is a 70 y.o. female presents to the ED with passing out episode, just prior to arrival, reports she got up quickly to go to the bathroom from bed, got lightheaded, yelled for her  and he helped lower her to the ground in the hallway, he states it seemed she was \"out\" for a few seconds, quickly came back to normal, no seizure activity, no vision changes currently, no dizziness, spinning sensation, states she feels a little nauseated but no vomiting, no diarrhea, no chest pain/SOB, no abd pain, no fevers, patient states she drinks water, but  reports she does not drink much fluids throughout the day. Is on BP medications, also a diabetic, denies head injury, no headache/neck pain, no back pain beyond her normal, no bowel/bladder incontinence, no saddle anesthesia, no urinary problems, no new numbness or weakness, just feels fatigued, no other complaints, modifying factors or associated symptoms. I have reviewed the following from the nursing documentation.     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, grade 1, uterine cancer with myometrial invasion 2 mm out of 19 mm with no cervical involvement, no lymphvascular space invasion, and all pelvic lymph nodes are negative    GERD (gastroesophageal reflux disease)     Hyperlipidemia     Hypertension     Right carpal tunnel syndrome 9/7/2016    Type II or unspecified type diabetes mellitus without mention of complication, not stated as uncontrolled      Past Surgical History:   Procedure Laterality Date    BLADDER SURGERY      tuck    CARPAL TUNNEL RELEASE      LEFT HAND    CARPAL TUNNEL RELEASE Right     CHOLECYSTECTOMY      CAROLINE AND BSO  2011    he patient underwent her staging procedure consisting of a hysterectomy, bilateral salpingo-oophorectomy, and bilateral pelvic (63.5 kg)   LMP 06/12/1991 (Approximate)   SpO2 98%   BMI 25.61 kg/m²   GENERAL APPEARANCE: Awake and alert. Cooperative. No acute distress  HEAD: Normocephalic. Atraumatic. EYES: PERRL. EOM's grossly intact. ENT: Mucous membranes are tacky/dry  NECK: Supple. No rigidity, no midline neck TTP, nml ROM, no midline spine TTP  HEART: RRR. No murmurs  LUNGS: Respirations nonlabored. Lungs are CTAB. ABDOMEN: Soft. Non-distended. Non-tender. No guarding or rebound. Normal bowel sounds. EXTREMITIES: No peripheral edema. Moves all extremities equally. All extremities neurovascularly intact. SKIN: Warm and dry. No acute rashes. NEUROLOGICAL: Alert and oriented. No gross facial drooping. Strength 5/5, sensation intact. No truncal ataxia. CN II-VII grossly intact, nml finger/nose testing b/l, nml gait- after fluids given  PSYCHIATRIC: Normal mood and affect. LABS  I have reviewed all labs for this visit.    Results for orders placed or performed during the hospital encounter of 01/02/20   CBC Auto Differential   Result Value Ref Range    WBC 7.9 4.0 - 11.0 K/uL    RBC 4.09 4.00 - 5.20 M/uL    Hemoglobin 12.8 12.0 - 16.0 g/dL    Hematocrit 38.4 36.0 - 48.0 %    MCV 93.9 80.0 - 100.0 fL    MCH 31.3 26.0 - 34.0 pg    MCHC 33.3 31.0 - 36.0 g/dL    RDW 13.5 12.4 - 15.4 %    Platelets 859 924 - 993 K/uL    MPV 7.7 5.0 - 10.5 fL    Neutrophils % 57.2 %    Lymphocytes % 29.6 %    Monocytes % 9.3 %    Eosinophils % 3.0 %    Basophils % 0.9 %    Neutrophils Absolute 4.5 1.7 - 7.7 K/uL    Lymphocytes Absolute 2.3 1.0 - 5.1 K/uL    Monocytes Absolute 0.7 0.0 - 1.3 K/uL    Eosinophils Absolute 0.2 0.0 - 0.6 K/uL    Basophils Absolute 0.1 0.0 - 0.2 K/uL   Comprehensive Metabolic Panel w/ Reflex to MG   Result Value Ref Range    Sodium 138 136 - 145 mmol/L    Potassium reflex Magnesium 3.7 3.5 - 5.1 mmol/L    Chloride 99 99 - 110 mmol/L    CO2 25 21 - 32 mmol/L    Anion Gap 14 3 - 16    Glucose 139 (H) 70 - 99 mg/dL    BUN 19 7 - 20 mg/dL    CREATININE 0.9 0.6 - 1.2 mg/dL    GFR Non-African American >60 >60    GFR African American >60 >60    Calcium 9.9 8.3 - 10.6 mg/dL    Total Protein 7.5 6.4 - 8.2 g/dL    Alb 4.1 3.4 - 5.0 g/dL    Albumin/Globulin Ratio 1.2 1.1 - 2.2    Total Bilirubin 0.5 0.0 - 1.0 mg/dL    Alkaline Phosphatase 74 40 - 129 U/L    ALT 12 10 - 40 U/L    AST 15 15 - 37 U/L    Globulin 3.4 g/dL   Urinalysis Reflex to Culture   Result Value Ref Range    Color, UA Yellow Straw/Yellow    Clarity, UA Clear Clear    Glucose, Ur Negative Negative mg/dL    Bilirubin Urine Negative Negative    Ketones, Urine Negative Negative mg/dL    Specific Gravity, UA <=1.005 1.005 - 1.030    Blood, Urine Negative Negative    pH, UA 6.5 5.0 - 8.0    Protein, UA Negative Negative mg/dL    Urobilinogen, Urine 0.2 <2.0 E.U./dL    Nitrite, Urine Negative Negative    Leukocyte Esterase, Urine Negative Negative    Microscopic Examination Not Indicated     Urine Type NotGiven     Urine Reflex to Culture Not Indicated    Troponin   Result Value Ref Range    Troponin <0.01 <0.01 ng/mL   EKG 12 Lead   Result Value Ref Range    Ventricular Rate 80 BPM    Atrial Rate 80 BPM    P-R Interval 172 ms    QRS Duration 68 ms    Q-T Interval 362 ms    QTc Calculation (Bazett) 417 ms    P Axis 82 degrees    R Axis 74 degrees    T Axis 75 degrees    Diagnosis       Normal sinus rhythmSeptal infarct , age undeterminedAbnormal ECGNo previous ECGs available       EKG  The Ekg interpreted by me shows  Normal sinus rhythm, rate 80, AL interval 172, QTc 417, no significant ST elevation/depression    ED COURSE/MDM  Patient seen and evaluated. Old records reviewed. Labs and imaging reviewed and results discussed with patient. Plan of care discussed with patient and family. Patient and family in agreement with plan.    70yo F w/ syncopal episode, w/ orthostasis, notable increase in pulse and decrease in BP w/ standing, felt much better after fluid bolus, and zofran for resp. rate 16, height 5' 2\" (1.575 m), weight 140 lb (63.5 kg), last menstrual period 06/12/1991, SpO2 98 %, not currently breastfeeding. DISPOSITION  Yue Wild was discharged to home in stable condition.                    Darshana Mtichell DO  01/12/20 0561

## 2020-01-02 NOTE — ED NOTES
Ambulate to the bathroom, void for UA and return to room. Gait is steady.       Josy Rivas RN  01/02/20 8769

## 2020-06-22 ENCOUNTER — HOSPITAL ENCOUNTER (OUTPATIENT)
Dept: MAMMOGRAPHY | Age: 80
Discharge: HOME | End: 2020-06-22
Payer: MEDICARE

## 2020-06-22 PROCEDURE — 77063 BREAST TOMOSYNTHESIS BI: CPT

## 2020-06-23 ENCOUNTER — TELEPHONE (OUTPATIENT)
Dept: MAMMOGRAPHY | Age: 80
End: 2020-06-23

## 2021-06-24 ENCOUNTER — HOSPITAL ENCOUNTER (OUTPATIENT)
Dept: MAMMOGRAPHY | Age: 81
Discharge: HOME | End: 2021-06-24
Payer: MEDICARE

## 2021-06-24 ENCOUNTER — TELEPHONE (OUTPATIENT)
Dept: MAMMOGRAPHY | Age: 81
End: 2021-06-24

## 2021-06-24 DIAGNOSIS — Z12.31 SCREENING MAMMOGRAM, ENCOUNTER FOR: ICD-10-CM

## 2021-06-24 PROCEDURE — 77063 BREAST TOMOSYNTHESIS BI: CPT

## 2021-08-14 ENCOUNTER — APPOINTMENT (OUTPATIENT)
Dept: GENERAL RADIOLOGY | Age: 81
DRG: 178 | End: 2021-08-14
Payer: MEDICARE

## 2021-08-14 ENCOUNTER — APPOINTMENT (OUTPATIENT)
Dept: CT IMAGING | Age: 81
DRG: 178 | End: 2021-08-14
Payer: MEDICARE

## 2021-08-14 ENCOUNTER — HOSPITAL ENCOUNTER (EMERGENCY)
Age: 81
Discharge: HOME | DRG: 178 | End: 2021-08-14
Attending: EMERGENCY MEDICINE
Payer: MEDICARE

## 2021-08-14 VITALS
HEART RATE: 73 BPM | BODY MASS INDEX: 23.37 KG/M2 | SYSTOLIC BLOOD PRESSURE: 109 MMHG | RESPIRATION RATE: 14 BRPM | OXYGEN SATURATION: 98 % | WEIGHT: 127 LBS | HEIGHT: 62 IN | TEMPERATURE: 98.1 F | DIASTOLIC BLOOD PRESSURE: 58 MMHG

## 2021-08-14 DIAGNOSIS — N30.00 ACUTE CYSTITIS WITHOUT HEMATURIA: ICD-10-CM

## 2021-08-14 DIAGNOSIS — B37.0 ORAL THRUSH: ICD-10-CM

## 2021-08-14 DIAGNOSIS — R55 SYNCOPE, UNSPECIFIED SYNCOPE TYPE: Primary | ICD-10-CM

## 2021-08-14 LAB
A/G RATIO: 1.5 (ref 1.1–2.2)
ALBUMIN SERPL-MCNC: 3.7 G/DL (ref 3.4–5)
ALP BLD-CCNC: 80 U/L (ref 40–129)
ALT SERPL-CCNC: 16 U/L (ref 10–40)
ANION GAP SERPL CALCULATED.3IONS-SCNC: 11 MMOL/L (ref 3–16)
AST SERPL-CCNC: 23 U/L (ref 15–37)
BACTERIA: ABNORMAL /HPF
BASOPHILS ABSOLUTE: 0 K/UL (ref 0–0.2)
BASOPHILS RELATIVE PERCENT: 0.7 %
BILIRUB SERPL-MCNC: 0.4 MG/DL (ref 0–1)
BILIRUBIN URINE: NEGATIVE
BLOOD, URINE: NEGATIVE
BUN BLDV-MCNC: 15 MG/DL (ref 7–20)
CALCIUM SERPL-MCNC: 8.9 MG/DL (ref 8.3–10.6)
CHLORIDE BLD-SCNC: 96 MMOL/L (ref 99–110)
CLARITY: CLEAR
CO2: 23 MMOL/L (ref 21–32)
COLOR: YELLOW
CREAT SERPL-MCNC: 0.6 MG/DL (ref 0.6–1.2)
EKG ATRIAL RATE: 71 BPM
EKG ATRIAL RATE: 77 BPM
EKG DIAGNOSIS: NORMAL
EKG DIAGNOSIS: NORMAL
EKG P AXIS: 72 DEGREES
EKG P AXIS: 74 DEGREES
EKG P-R INTERVAL: 162 MS
EKG P-R INTERVAL: 168 MS
EKG Q-T INTERVAL: 366 MS
EKG Q-T INTERVAL: 398 MS
EKG QRS DURATION: 66 MS
EKG QRS DURATION: 76 MS
EKG QTC CALCULATION (BAZETT): 414 MS
EKG QTC CALCULATION (BAZETT): 432 MS
EKG R AXIS: 30 DEGREES
EKG R AXIS: 41 DEGREES
EKG T AXIS: 73 DEGREES
EKG T AXIS: 77 DEGREES
EKG VENTRICULAR RATE: 71 BPM
EKG VENTRICULAR RATE: 77 BPM
EOSINOPHILS ABSOLUTE: 0 K/UL (ref 0–0.6)
EOSINOPHILS RELATIVE PERCENT: 0 %
EPITHELIAL CELLS, UA: ABNORMAL /HPF (ref 0–5)
GFR AFRICAN AMERICAN: >60
GFR NON-AFRICAN AMERICAN: >60
GLOBULIN: 2.4 G/DL
GLUCOSE BLD-MCNC: 136 MG/DL (ref 70–99)
GLUCOSE URINE: NEGATIVE MG/DL
HCT VFR BLD CALC: 34.6 % (ref 36–48)
HEMOGLOBIN: 11.8 G/DL (ref 12–16)
KETONES, URINE: 15 MG/DL
LEUKOCYTE ESTERASE, URINE: NEGATIVE
LYMPHOCYTES ABSOLUTE: 1.1 K/UL (ref 1–5.1)
LYMPHOCYTES RELATIVE PERCENT: 19.5 %
MCH RBC QN AUTO: 31.6 PG (ref 26–34)
MCHC RBC AUTO-ENTMCNC: 34.1 G/DL (ref 31–36)
MCV RBC AUTO: 92.4 FL (ref 80–100)
MICROSCOPIC EXAMINATION: YES
MONOCYTES ABSOLUTE: 0.5 K/UL (ref 0–1.3)
MONOCYTES RELATIVE PERCENT: 9.9 %
NEUTROPHILS ABSOLUTE: 3.8 K/UL (ref 1.7–7.7)
NEUTROPHILS RELATIVE PERCENT: 69.9 %
NITRITE, URINE: POSITIVE
PDW BLD-RTO: 13.1 % (ref 12.4–15.4)
PH UA: 6 (ref 5–8)
PLATELET # BLD: 156 K/UL (ref 135–450)
PMV BLD AUTO: 7.2 FL (ref 5–10.5)
POTASSIUM REFLEX MAGNESIUM: 3.8 MMOL/L (ref 3.5–5.1)
PROTEIN UA: NEGATIVE MG/DL
RBC # BLD: 3.74 M/UL (ref 4–5.2)
RBC UA: ABNORMAL /HPF (ref 0–4)
SODIUM BLD-SCNC: 130 MMOL/L (ref 136–145)
SPECIFIC GRAVITY UA: 1.01 (ref 1–1.03)
TOTAL PROTEIN: 6.1 G/DL (ref 6.4–8.2)
TROPONIN: <0.01 NG/ML
TROPONIN: <0.01 NG/ML
URINE TYPE: ABNORMAL
UROBILINOGEN, URINE: 0.2 E.U./DL
WBC # BLD: 5.5 K/UL (ref 4–11)
WBC UA: ABNORMAL /HPF (ref 0–5)

## 2021-08-14 PROCEDURE — 87086 URINE CULTURE/COLONY COUNT: CPT

## 2021-08-14 PROCEDURE — 93005 ELECTROCARDIOGRAM TRACING: CPT | Performed by: EMERGENCY MEDICINE

## 2021-08-14 PROCEDURE — 36415 COLL VENOUS BLD VENIPUNCTURE: CPT

## 2021-08-14 PROCEDURE — 99285 EMERGENCY DEPT VISIT HI MDM: CPT

## 2021-08-14 PROCEDURE — 87186 SC STD MICRODIL/AGAR DIL: CPT

## 2021-08-14 PROCEDURE — 71045 X-RAY EXAM CHEST 1 VIEW: CPT

## 2021-08-14 PROCEDURE — 80053 COMPREHEN METABOLIC PANEL: CPT

## 2021-08-14 PROCEDURE — 93010 ELECTROCARDIOGRAM REPORT: CPT | Performed by: INTERNAL MEDICINE

## 2021-08-14 PROCEDURE — 2580000003 HC RX 258: Performed by: EMERGENCY MEDICINE

## 2021-08-14 PROCEDURE — 6370000000 HC RX 637 (ALT 250 FOR IP): Performed by: EMERGENCY MEDICINE

## 2021-08-14 PROCEDURE — 87088 URINE BACTERIA CULTURE: CPT

## 2021-08-14 PROCEDURE — 70450 CT HEAD/BRAIN W/O DYE: CPT

## 2021-08-14 PROCEDURE — 84484 ASSAY OF TROPONIN QUANT: CPT

## 2021-08-14 PROCEDURE — 81001 URINALYSIS AUTO W/SCOPE: CPT

## 2021-08-14 PROCEDURE — 85025 COMPLETE CBC W/AUTO DIFF WBC: CPT

## 2021-08-14 RX ORDER — CIPROFLOXACIN 500 MG/1
500 TABLET, FILM COATED ORAL 2 TIMES DAILY
Qty: 6 TABLET | Refills: 0 | Status: ON HOLD | OUTPATIENT
Start: 2021-08-14 | End: 2021-08-18 | Stop reason: HOSPADM

## 2021-08-14 RX ORDER — 0.9 % SODIUM CHLORIDE 0.9 %
1000 INTRAVENOUS SOLUTION INTRAVENOUS ONCE
Status: COMPLETED | OUTPATIENT
Start: 2021-08-14 | End: 2021-08-14

## 2021-08-14 RX ORDER — IPRATROPIUM BROMIDE AND ALBUTEROL SULFATE 2.5; .5 MG/3ML; MG/3ML
1 SOLUTION RESPIRATORY (INHALATION) ONCE
Status: COMPLETED | OUTPATIENT
Start: 2021-08-14 | End: 2021-08-14

## 2021-08-14 RX ADMIN — IPRATROPIUM BROMIDE AND ALBUTEROL SULFATE 1 AMPULE: .5; 3 SOLUTION RESPIRATORY (INHALATION) at 06:22

## 2021-08-14 RX ADMIN — SODIUM CHLORIDE 1000 ML: 9 INJECTION, SOLUTION INTRAVENOUS at 06:22

## 2021-08-14 NOTE — ED PROVIDER NOTES
Emergency Department Provider Note  Location: [place] [address] [facility],[address]      I assumed patient care at 0700 from Dr. Kenneth Jackson. Please refer to his/her note for the history, physical exam, and initial medical decision making. Briefly, this is a 80 y.o. female here for syncope. At the time of sign out, labs and imaging results were pending. I introduced myself to the patient and asked her little bit more about her history of present illness. Patient says she got up around 4 AM this morning. She has been having throat pain for a few days so she went into the kitchen to get her something for the throat pain. She then suddenly got lightheaded. She states she realized she was about to pass out so she yelled for her . Next thing she knows, her  was looking at her and she was on the floor. She denies chest pain or headache. Denies vision changes. States she has not been eating or drinking as much lately because of the throat pain. She feels better now. Final ED Course and MDM:  Radiology  CT Head WO Contrast    Result Date: 8/14/2021  EXAMINATION: CT OF THE HEAD WITHOUT CONTRAST  8/14/2021 5:54 am TECHNIQUE: CT of the head was performed without the administration of intravenous contrast. Dose modulation, iterative reconstruction, and/or weight based adjustment of the mA/kV was utilized to reduce the radiation dose to as low as reasonably achievable. COMPARISON: None. HISTORY: ORDERING SYSTEM PROVIDED HISTORY: Syncope TECHNOLOGIST PROVIDED HISTORY: Reason for exam:->Syncope Has a \"code stroke\" or \"stroke alert\" been called? ->No Decision Support Exception - unselect if not a suspected or confirmed emergency medical condition->Emergency Medical Condition (MA) Reason for Exam: loc Acuity: Acute Type of Exam: Initial FINDINGS: BRAIN/VENTRICLES: The ventricles are proportional to the cisterns and sulci. There is a mild degree of generalized volume loss. There is no hydrocephalus.   There is a wedge like defect in the inferior aspect of the left cerebellar hemisphere. The gray-white matter differentiation is distinct. There is no hemorrhage or abnormal extra-axial fluid collection otherwise identified. ORBITS: The visualized portion of the orbits demonstrate no acute abnormality. SINUSES: There is mucosal thickening of the maxillary sinuses, greater on the right. There are scattered opacified ethmoid air cells. Left fronto ethmoidal region is opacified. Sphenoid sinuses are clear. Mastoid air cells are clear. SOFT TISSUES/SKULL:  No acute abnormality of the visualized skull or soft tissues. No acute intracranial abnormality. 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 Mild acute or chronic inflammatory disease of the anterior paranasal sinuses. XR CHEST PORTABLE    Result Date: 8/14/2021  EXAMINATION: ONE XRAY VIEW OF THE CHEST 8/14/2021 5:54 am COMPARISON: 11/14/2018 HISTORY: ORDERING SYSTEM PROVIDED HISTORY: wheezing TECHNOLOGIST PROVIDED HISTORY: Reason for exam:->wheezing Reason for Exam: loc Acuity: Acute Type of Exam: Initial FINDINGS: Scarring is again seen on the right without acute airspace opacity. The heart size is within normal limits. The costophrenic angles are sharp. There is no discernible pneumothorax. No acute disease.        Lab  Results for orders placed or performed during the hospital encounter of 08/14/21   CBC Auto Differential   Result Value Ref Range    WBC 5.5 4.0 - 11.0 K/uL    RBC 3.74 (L) 4.00 - 5.20 M/uL    Hemoglobin 11.8 (L) 12.0 - 16.0 g/dL    Hematocrit 34.6 (L) 36.0 - 48.0 %    MCV 92.4 80.0 - 100.0 fL    MCH 31.6 26.0 - 34.0 pg    MCHC 34.1 31.0 - 36.0 g/dL    RDW 13.1 12.4 - 15.4 %    Platelets 714 209 - 032 K/uL    MPV 7.2 5.0 - 10.5 fL    Neutrophils % 69.9 %    Lymphocytes % 19.5 %    Monocytes % 9.9 %    Eosinophils % 0.0 %    Basophils % 0.7 %    Neutrophils Absolute 3.8 1.7 - 7.7 K/uL    Lymphocytes Absolute 1.1 1.0 - 5.1 K/uL    Monocytes Absolute 0.5 0.0 - 1.3 K/uL    Eosinophils Absolute 0.0 0.0 - 0.6 K/uL    Basophils Absolute 0.0 0.0 - 0.2 K/uL   Comprehensive Metabolic Panel w/ Reflex to MG   Result Value Ref Range    Sodium 130 (L) 136 - 145 mmol/L    Potassium reflex Magnesium 3.8 3.5 - 5.1 mmol/L    Chloride 96 (L) 99 - 110 mmol/L    CO2 23 21 - 32 mmol/L    Anion Gap 11 3 - 16    Glucose 136 (H) 70 - 99 mg/dL    BUN 15 7 - 20 mg/dL    CREATININE 0.6 0.6 - 1.2 mg/dL    GFR Non-African American >60 >60    GFR African American >60 >60    Calcium 8.9 8.3 - 10.6 mg/dL    Total Protein 6.1 (L) 6.4 - 8.2 g/dL    Albumin 3.7 3.4 - 5.0 g/dL    Albumin/Globulin Ratio 1.5 1.1 - 2.2    Total Bilirubin 0.4 0.0 - 1.0 mg/dL    Alkaline Phosphatase 80 40 - 129 U/L    ALT 16 10 - 40 U/L    AST 23 15 - 37 U/L    Globulin 2.4 g/dL   Troponin   Result Value Ref Range    Troponin <0.01 <0.01 ng/mL   Urinalysis, reflex to microscopic   Result Value Ref Range    Color, UA Yellow Straw/Yellow    Clarity, UA Clear Clear    Glucose, Ur Negative Negative mg/dL    Bilirubin Urine Negative Negative    Ketones, Urine 15 (A) Negative mg/dL    Specific Gravity, UA 1.010 1.005 - 1.030    Blood, Urine Negative Negative    pH, UA 6.0 5.0 - 8.0    Protein, UA Negative Negative mg/dL    Urobilinogen, Urine 0.2 <2.0 E.U./dL    Nitrite, Urine POSITIVE (A) Negative    Leukocyte Esterase, Urine Negative Negative    Microscopic Examination YES     Urine Type NotGiven    Microscopic Urinalysis   Result Value Ref Range    WBC, UA 6-9 (A) 0 - 5 /HPF    RBC, UA 0-2 0 - 4 /HPF    Epithelial Cells, UA 0-1 0 - 5 /HPF    Bacteria, UA 4+ (A) None Seen /HPF   Troponin   Result Value Ref Range    Troponin <0.01 <0.01 ng/mL   EKG 12 Lead   Result Value Ref Range    Ventricular Rate 77 BPM    Atrial Rate 77 BPM    P-R Interval 168 ms    QRS Duration 66 ms    Q-T Interval 366 ms    QTc Calculation (Bazett) 414 ms    P Axis 72 degrees    R Axis 41 degrees    T Axis 73 degrees    Diagnosis       Normal sinus rhythmSeptal infarct , age undeterminedAbnormal ECGNo previous ECGs available         ED Medication Orders (From admission, onward)    Start Ordered     Status Ordering Provider    21 0615 21 0550  0.9 % sodium chloride bolus  ONCE      Last MAR action: [facility]    21 0615 21 0556  ipratropium-albuterol (DUONEB) nebulizer solution 1 ampule  ONCE     Question:  Initiate RT Bronchodilator Protocol  Answer:  Yes    Last MAR action: Given - [facility]          Orthostatic VS obtained after 1L fluid infusion. Blood Pressure Lyin/65   Pulse Lyin PER MINUTE   Blood Pressure Sittin/68   Pulse Sittin PER MINUTE   Blood Pressure Standin/69   Pulse Standin PER MINUTE     EKG#2  The Ekg interpreted by me in the absence of a cardiologist shows. normal sinus rhythm with a rate of 71  Axis is   Normal  QTc is  normal  Intervals and Durations are unremarkable. No specific ST-T wave changes appreciated. No evidence of acute ischemia. No significant change from prior EKG dated earlier today       80-year-old lady presented for syncope. She had EKG and troponin that was unremarkable. Lab work-up showed borderline anemia. She also has borderline sodium of 130, not clinically significant. BUN and creatinine normal.  Glucose 136. Urinalysis showed positive nitrite with 6-9 white blood cell. We will send for urine culture. There is some ketones on urine. Imaging results were unremarkable. I discussed these results with the patient. I explained that a 80-year-old female with syncope can be somewhat concerning. Her syncope was preceded by lightheadedness, which may be due to blood pressure changes. We did orthostatic vital signs here by that was obtained after the patient received 1 L of fluids.   Her orthostatic vital sign was normal.  We discussed the risks and benefits of admission for 1 night for observation. Patient declined admission would prefer to go home. I therefore recommended that she at least get a repeat EKG and troponin to trend for any changes. Repeat EKG did not show any acute changes. And repeat troponin again was negative. Decision making was made and we agreed on discharge. Patient will receive a prescription for her oral thrush. I also strongly recommended that she follow-up as outpatient with her PCP as oral thrush is a sign of immunocompromise. With regards to her urinalysis, we decided to empirically treat with 3 days of antibiotics while waiting on culture result. She has been advised to return if she develops chest pain, shortness of breath, palpitation, headache, fever, or lightheadedness. I estimate there is LOW risk for ACUTE CORONARY SYNDROME, INTRACRANIAL HEMORRHAGE, MALIGNANT DYSRHYTHMIA, MENINGITIS, PNEUMONIA, PULMONARY EMBOLISM, SEPSIS, SUBARACHNOID HEMORRHAGE, SUBDURAL HEMATOMA, or STROKE, thus I consider the discharge disposition reasonable. Ricardo Perera and I have discussed the diagnosis and risks, and we agree with discharging home to follow-up with PCP. We also discussed returning to the Emergency Department immediately if new or worsening symptoms occur. We have discussed the symptoms which are most concerning (e.g., changing or worsening pain, weakness, vomiting, fever) that necessitate immediate return. Clinical Impression:  1. Syncope, unspecified syncope type    2. Oral thrush    3. Acute cystitis without hematuria        Disposition:  Discharge to home in good condition. Blood pressure 119/72, pulse 80, temperature 98.4 °F (36.9 °C), temperature source Oral, resp. rate 16, height 5' 2\" (1.575 m), weight 127 lb (57.6 kg), last menstrual period 06/12/1991, SpO2 95 %, not currently breastfeeding. Patient was given scripts for the following medications.  I counseled patient how to take these medications. New Prescriptions    CIPROFLOXACIN (CIPRO) 500 MG TABLET    Take 1 tablet by mouth 2 times daily for 3 days    NYSTATIN (MYCOSTATIN) 809469 UNIT/ML SUSPENSION    Take 5 mLs by mouth 4 times daily Swish and spit       Disposition referral (if applicable): Apoorva Cisneros MD  . [address]  [phone]    Schedule an appointment as soon as possible for a visit in 3 days      [place] [id number].  [facility]  [address] [place] [address]  [phone]    As needed, If symptoms worsen     Ashley Talbert MD  [address] [place] [address] Dedra Rios MD  08/14/21 0695

## 2021-08-14 NOTE — ED PROVIDER NOTES
Emergency Department Attending Note    Gopal Day MD    Date of ED VIsit: 8/14/2021    CHIEF COMPLAINT  Loss of Consciousness (pt woke up this morning and was walking to bathroom to take pain medication for her sore throat x4 days. per pt she walking back and began to feel like she was going to pass out, last thing she remebres from that was waking up sitting on the floor. pt has had decresed oral intake since tuesday. ) and Pharyngitis      HISTORY OF PRESENT ILLNESS  Doris Castro is a 80 y.o. female  With Vital signs of /78   Pulse 87   Temp 98.4 °F (36.9 °C) (Oral)   Resp 16   Ht 5' 2\" (1.575 m)   Wt 127 lb (57.6 kg)   LMP 06/12/1991 (Approximate)   SpO2 97%   BMI 23.23 kg/m²  who presents to the ED with a complaint of sore throat x5 days. Patient seen and evaluated in room 3. Patient is complaining of a sore throat that prevents her from eating for the past 5 days. Apparently she got up to take something for pain for her throat around 4 AM and apparently collapsed to the ground and fairly well describes waking up in her 's arms as a possible result of syncope. She had no complaints prior to this other than sore throat. No chest pain no shortness of breath no head pain no shortness of breath. She has had no fevers or chills to go along with this. She just feels that she has not been eating or drinking very well for the past 5 days and that might be a moderating factor here in her collapse. .  No other complaints, modifying factors or associated symptoms. No head trauma. No extremity trauma.     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, grade 1, uterine cancer with myometrial invasion 2 mm out of 19 mm with no cervical involvement, no lymphvascular space invasion, and all pelvic lymph nodes are negative    GERD (gastroesophageal reflux disease)     Hyperlipidemia     Hypertension     Right carpal tunnel syndrome 9/7/2016    Type II or unspecified type diabetes mellitus without mention of complication, not stated as uncontrolled      Past Surgical History:   Procedure Laterality Date    BLADDER SURGERY      tuck    BREAST BIOPSY      CARPAL TUNNEL RELEASE      LEFT HAND    CARPAL TUNNEL RELEASE Right     CHOLECYSTECTOMY      HYSTERECTOMY      CAROLINE AND BSO  2011    he patient underwent her staging procedure consisting of a hysterectomy, bilateral salpingo-oophorectomy, and bilateral pelvic lymph node dissection in March of 2011 under the direction of Dr. Pawel Muhammad. The patient followed with Dr. Pawel Muhammad until he moved to another location outside of the city. The patient has had no evidence of disease to date of 7-       I have reviewed the following from the nursing documentation. Family History   Problem Relation Age of Onset    Diabetes Mother     Heart Disease Mother     High Blood Pressure Mother     Stroke Father     Diabetes Brother      Social History     Socioeconomic History    Marital status:      Spouse name: Not on file    Number of children: Not on file    Years of education: Not on file    Highest education level: Not on file   Occupational History    Not on file   Tobacco Use    Smoking status: Never Smoker    Smokeless tobacco: Never Used   Substance and Sexual Activity    Alcohol use: No    Drug use: No    Sexual activity: Yes   Other Topics Concern    Not on file   Social History Narrative    Not on file     Social Determinants of Health     Financial Resource Strain:     Difficulty of Paying Living Expenses:    Food Insecurity:     Worried About Running Out of Food in the Last Year:     Ran Out of Food in the Last Year:    Transportation Needs:     Lack of Transportation (Medical):      Lack of Transportation (Non-Medical):    Physical Activity:     Days of Exercise per Week:     Minutes of Exercise per Session:    Stress:     Feeling of Stress :    Social Connections:     Frequency of Communication with Friends and Family:     Frequency of Social Gatherings with Friends and Family:     Attends Congregational Services:     Active Member of Clubs or Organizations:     Attends Club or Organization Meetings:     Marital Status:    Intimate Partner Violence:     Fear of Current or Ex-Partner:     Emotionally Abused:     Physically Abused:     Sexually Abused:      Current Facility-Administered Medications   Medication Dose Route Frequency Provider Last Rate Last Admin    0.9 % sodium chloride bolus  1,000 mL Intravenous Once James Lugo MD         Current Outpatient Medications   Medication Sig Dispense Refill    azelastine (ASTELIN) 0.1 % nasal spray 2 sprays by Nasal route      triamterene-hydrochlorothiazide (DYAZIDE) 37.5-25 MG per capsule Take 1 capsule by mouth      ondansetron (ZOFRAN ODT) 4 MG disintegrating tablet Take 1 tablet by mouth every 8 hours as needed for Nausea or Vomiting 12 tablet 0    calcium carbonate (OSCAL) 500 MG TABS tablet Take 500 mg by mouth daily      vitamin D (CHOLECALCIFEROL) 1000 UNIT TABS tablet Take 1,000 Units by mouth daily      Probiotic Product (PROBIOTIC ADVANCED PO) Take by mouth      oxybutynin (DITROPAN) 5 MG tablet TAKE 1 TABLET BY MOUTH 2 TIMES DAILY 180 tablet 3    Omega-3 Fatty Acids (OMEGA-3 FISH OIL) 1000 MG CAPS Take 1 capsule by mouth      Misc Natural Products (OSTEO BI-FLEX ADV DOUBLE ST PO) Take by mouth      metFORMIN (GLUCOPHAGE) 500 MG tablet Take 1 tablet by mouth 2 times daily (with meals) 180 tablet 1    atorvastatin (LIPITOR) 10 MG tablet Take 1 tablet by mouth daily (Patient taking differently: Take 5 mg by mouth daily ) 90 tablet 1    amLODIPine (NORVASC) 2.5 MG tablet Take 1 tablet by mouth daily 90 tablet 1    glucose blood VI test strips (ONE TOUCH ULTRA TEST) strip Check glucose daily.  Diagnosis code 250.00. 100 each 3    fluticasone (FLONASE) 50 MCG/ACT nasal spray 2 sprays by Nasal route daily 2 Bottle 5    Blood Glucose Monitoring Suppl FATOUMATA One touch ultra 2 glucose meter 1 Device 0    Ascorbic Acid (VITAMIN C) 500 MG tablet Take 500 mg by mouth daily. OTC      aspirin 81 MG tablet Take 81 mg by mouth daily. OTC  Indications: LAST DOSE= 03/03/11       Allergies   Allergen Reactions    Sulfa Antibiotics Anaphylaxis    Dicyclomine Hcl Hives, Itching and Rash    Penicillins      blisters    Sulfamethoxazole-Trimethoprim [Bactrim] Other (See Comments)     WHITE SPOTS IN MOUTH       REVIEW OF SYSTEMS  10 systems reviewed, pertinent positives per HPI otherwise noted to be negative     PHYSICAL EXAM  /78   Pulse 87   Temp 98.4 °F (36.9 °C) (Oral)   Resp 16   Ht 5' 2\" (1.575 m)   Wt 127 lb (57.6 kg)   LMP 06/12/1991 (Approximate)   SpO2 97%   BMI 23.23 kg/m²   GENERAL APPEARANCE: Awake and alert. Cooperative. In no obvious distress. HEAD: Normocephalic. Atraumatic. EYES: PERRL. EOM's grossly intact. ENT: Mucous membranes are pink and moist.  Patient has oral thrush  NECK: Supple. HEART: RRR. No murmurs. LUNGS: Respirations unlabored. Diffuse mild wheezing noted. Good air exchange. ABDOMEN: Soft. Non-distended. Non-tender. No masses. No organomegaly. No guarding or rebound. EXTREMITIES: No peripheral edema. Moves all extremities equally. All extremities neurovascularly intact. SKIN: Warm and dry. No acute rashes. NEUROLOGICAL: Alert and oriented. Cranial nerves II through XII are intact. Strength 5/5, sensation intact. Gait normal.   PSYCHIATRIC: Normal mood and affect. No HI or SI expressed to me. RADIOLOGY    If acquired see below     EKG:     If acquired see below       ED COURSE/MDM        ED Course as of Aug 14 0702   Sat Aug 14, 2021   6660 EKG:    Indication: Syncope, it shows a rhythm of normal sinus rhythm at a rate of 77, with no acute ST-Twave changes to indicate

## 2021-08-14 NOTE — ED NOTES
Reviewed patient discharge instructions at this time, copy given to patient. No questions or concerns. Patient voiced understanding.         Barb Keene RN  08/14/21 8498

## 2021-08-16 ENCOUNTER — HOSPITAL ENCOUNTER (INPATIENT)
Age: 81
LOS: 1 days | Discharge: HOME | DRG: 178 | End: 2021-08-18
Attending: STUDENT IN AN ORGANIZED HEALTH CARE EDUCATION/TRAINING PROGRAM | Admitting: INTERNAL MEDICINE
Payer: MEDICARE

## 2021-08-16 DIAGNOSIS — Z87.440 HISTORY OF UTI: ICD-10-CM

## 2021-08-16 DIAGNOSIS — B37.0 THRUSH, ORAL: ICD-10-CM

## 2021-08-16 DIAGNOSIS — R55 SYNCOPE AND COLLAPSE: Primary | ICD-10-CM

## 2021-08-16 DIAGNOSIS — U07.1 COVID-19: ICD-10-CM

## 2021-08-16 DIAGNOSIS — R11.0 NAUSEA: ICD-10-CM

## 2021-08-16 LAB
A/G RATIO: 1.6 (ref 1.1–2.2)
ALBUMIN SERPL-MCNC: 3.7 G/DL (ref 3.4–5)
ALP BLD-CCNC: 69 U/L (ref 40–129)
ALT SERPL-CCNC: 16 U/L (ref 10–40)
ANION GAP SERPL CALCULATED.3IONS-SCNC: 13 MMOL/L (ref 3–16)
AST SERPL-CCNC: 22 U/L (ref 15–37)
BASOPHILS ABSOLUTE: 0 K/UL (ref 0–0.2)
BASOPHILS RELATIVE PERCENT: 0.5 %
BILIRUB SERPL-MCNC: 0.4 MG/DL (ref 0–1)
BILIRUBIN URINE: NEGATIVE
BLOOD, URINE: NEGATIVE
BUN BLDV-MCNC: 9 MG/DL (ref 7–20)
CALCIUM SERPL-MCNC: 8.2 MG/DL (ref 8.3–10.6)
CHLORIDE BLD-SCNC: 96 MMOL/L (ref 99–110)
CLARITY: CLEAR
CO2: 22 MMOL/L (ref 21–32)
COLOR: YELLOW
CREAT SERPL-MCNC: 0.6 MG/DL (ref 0.6–1.2)
EKG ATRIAL RATE: 79 BPM
EKG DIAGNOSIS: NORMAL
EKG P AXIS: 73 DEGREES
EKG P-R INTERVAL: 158 MS
EKG Q-T INTERVAL: 378 MS
EKG QRS DURATION: 72 MS
EKG QTC CALCULATION (BAZETT): 433 MS
EKG R AXIS: 28 DEGREES
EKG T AXIS: 71 DEGREES
EKG VENTRICULAR RATE: 79 BPM
EOSINOPHILS ABSOLUTE: 0 K/UL (ref 0–0.6)
EOSINOPHILS RELATIVE PERCENT: 0 %
EPITHELIAL CELLS, UA: ABNORMAL /HPF (ref 0–5)
GFR AFRICAN AMERICAN: >60
GFR NON-AFRICAN AMERICAN: >60
GLOBULIN: 2.3 G/DL
GLUCOSE BLD-MCNC: 134 MG/DL (ref 70–99)
GLUCOSE BLD-MCNC: 88 MG/DL (ref 70–99)
GLUCOSE BLD-MCNC: 99 MG/DL (ref 70–99)
GLUCOSE URINE: NEGATIVE MG/DL
HCT VFR BLD CALC: 33.4 % (ref 36–48)
HEMOGLOBIN: 11.2 G/DL (ref 12–16)
KETONES, URINE: 15 MG/DL
LACTIC ACID: 0.8 MMOL/L (ref 0.4–2)
LEUKOCYTE ESTERASE, URINE: ABNORMAL
LIPASE: 15 U/L (ref 13–60)
LYMPHOCYTES ABSOLUTE: 0.9 K/UL (ref 1–5.1)
LYMPHOCYTES RELATIVE PERCENT: 27.4 %
MCH RBC QN AUTO: 31.6 PG (ref 26–34)
MCHC RBC AUTO-ENTMCNC: 33.7 G/DL (ref 31–36)
MCV RBC AUTO: 93.8 FL (ref 80–100)
MICROSCOPIC EXAMINATION: YES
MONOCYTES ABSOLUTE: 0.4 K/UL (ref 0–1.3)
MONOCYTES RELATIVE PERCENT: 11.1 %
NEUTROPHILS ABSOLUTE: 2 K/UL (ref 1.7–7.7)
NEUTROPHILS RELATIVE PERCENT: 61 %
NITRITE, URINE: NEGATIVE
PDW BLD-RTO: 13 % (ref 12.4–15.4)
PERFORMED ON: ABNORMAL
PERFORMED ON: NORMAL
PH UA: 6 (ref 5–8)
PLATELET # BLD: 133 K/UL (ref 135–450)
PMV BLD AUTO: 8.3 FL (ref 5–10.5)
POTASSIUM REFLEX MAGNESIUM: 3.9 MMOL/L (ref 3.5–5.1)
PROCALCITONIN: 0.05 NG/ML (ref 0–0.15)
PROTEIN UA: NEGATIVE MG/DL
RBC # BLD: 3.56 M/UL (ref 4–5.2)
RBC UA: ABNORMAL /HPF (ref 0–4)
SARS-COV-2, NAAT: DETECTED
SODIUM BLD-SCNC: 131 MMOL/L (ref 136–145)
SPECIFIC GRAVITY UA: <=1.005 (ref 1–1.03)
TOTAL PROTEIN: 6 G/DL (ref 6.4–8.2)
TROPONIN: <0.01 NG/ML
URINE TYPE: ABNORMAL
UROBILINOGEN, URINE: 0.2 E.U./DL
WBC # BLD: 3.4 K/UL (ref 4–11)
WBC UA: ABNORMAL /HPF (ref 0–5)

## 2021-08-16 PROCEDURE — G0378 HOSPITAL OBSERVATION PER HR: HCPCS

## 2021-08-16 PROCEDURE — 84145 PROCALCITONIN (PCT): CPT

## 2021-08-16 PROCEDURE — 83605 ASSAY OF LACTIC ACID: CPT

## 2021-08-16 PROCEDURE — 6360000002 HC RX W HCPCS: Performed by: PHYSICIAN ASSISTANT

## 2021-08-16 PROCEDURE — 81001 URINALYSIS AUTO W/SCOPE: CPT

## 2021-08-16 PROCEDURE — 96372 THER/PROPH/DIAG INJ SC/IM: CPT

## 2021-08-16 PROCEDURE — 85025 COMPLETE CBC W/AUTO DIFF WBC: CPT

## 2021-08-16 PROCEDURE — 93005 ELECTROCARDIOGRAM TRACING: CPT | Performed by: STUDENT IN AN ORGANIZED HEALTH CARE EDUCATION/TRAINING PROGRAM

## 2021-08-16 PROCEDURE — 80053 COMPREHEN METABOLIC PANEL: CPT

## 2021-08-16 PROCEDURE — 6370000000 HC RX 637 (ALT 250 FOR IP): Performed by: NURSE PRACTITIONER

## 2021-08-16 PROCEDURE — 36415 COLL VENOUS BLD VENIPUNCTURE: CPT

## 2021-08-16 PROCEDURE — 6370000000 HC RX 637 (ALT 250 FOR IP): Performed by: PHYSICIAN ASSISTANT

## 2021-08-16 PROCEDURE — 93010 ELECTROCARDIOGRAM REPORT: CPT | Performed by: INTERNAL MEDICINE

## 2021-08-16 PROCEDURE — 6360000002 HC RX W HCPCS: Performed by: NURSE PRACTITIONER

## 2021-08-16 PROCEDURE — 83690 ASSAY OF LIPASE: CPT

## 2021-08-16 PROCEDURE — 96361 HYDRATE IV INFUSION ADD-ON: CPT

## 2021-08-16 PROCEDURE — 96375 TX/PRO/DX INJ NEW DRUG ADDON: CPT

## 2021-08-16 PROCEDURE — 87635 SARS-COV-2 COVID-19 AMP PRB: CPT

## 2021-08-16 PROCEDURE — 99284 EMERGENCY DEPT VISIT MOD MDM: CPT

## 2021-08-16 PROCEDURE — 2580000003 HC RX 258: Performed by: NURSE PRACTITIONER

## 2021-08-16 PROCEDURE — 84484 ASSAY OF TROPONIN QUANT: CPT

## 2021-08-16 PROCEDURE — 2580000003 HC RX 258: Performed by: PHYSICIAN ASSISTANT

## 2021-08-16 PROCEDURE — 6370000000 HC RX 637 (ALT 250 FOR IP): Performed by: INTERNAL MEDICINE

## 2021-08-16 PROCEDURE — 96374 THER/PROPH/DIAG INJ IV PUSH: CPT

## 2021-08-16 RX ORDER — SODIUM CHLORIDE 0.9 % (FLUSH) 0.9 %
5-40 SYRINGE (ML) INJECTION EVERY 12 HOURS SCHEDULED
Status: DISCONTINUED | OUTPATIENT
Start: 2021-08-16 | End: 2021-08-18 | Stop reason: HOSPADM

## 2021-08-16 RX ORDER — DEXTROSE MONOHYDRATE 25 G/50ML
12.5 INJECTION, SOLUTION INTRAVENOUS PRN
Status: DISCONTINUED | OUTPATIENT
Start: 2021-08-16 | End: 2021-08-18 | Stop reason: HOSPADM

## 2021-08-16 RX ORDER — SODIUM CHLORIDE 9 MG/ML
25 INJECTION, SOLUTION INTRAVENOUS PRN
Status: DISCONTINUED | OUTPATIENT
Start: 2021-08-16 | End: 2021-08-18 | Stop reason: HOSPADM

## 2021-08-16 RX ORDER — ACYCLOVIR 800 MG/1
400 TABLET ORAL 2 TIMES DAILY
COMMUNITY

## 2021-08-16 RX ORDER — OXYBUTYNIN CHLORIDE 5 MG/1
5 TABLET ORAL 2 TIMES DAILY
Status: DISCONTINUED | OUTPATIENT
Start: 2021-08-16 | End: 2021-08-18 | Stop reason: HOSPADM

## 2021-08-16 RX ORDER — POLYETHYLENE GLYCOL 3350 17 G/17G
17 POWDER, FOR SOLUTION ORAL DAILY PRN
Status: DISCONTINUED | OUTPATIENT
Start: 2021-08-16 | End: 2021-08-18 | Stop reason: HOSPADM

## 2021-08-16 RX ORDER — SODIUM CHLORIDE 0.9 % (FLUSH) 0.9 %
5-40 SYRINGE (ML) INJECTION PRN
Status: DISCONTINUED | OUTPATIENT
Start: 2021-08-16 | End: 2021-08-18 | Stop reason: HOSPADM

## 2021-08-16 RX ORDER — BIOTIN 10 MG
10 TABLET ORAL DAILY
COMMUNITY
End: 2022-03-03

## 2021-08-16 RX ORDER — ONDANSETRON 2 MG/ML
4 INJECTION INTRAMUSCULAR; INTRAVENOUS ONCE
Status: COMPLETED | OUTPATIENT
Start: 2021-08-16 | End: 2021-08-16

## 2021-08-16 RX ORDER — FLUTICASONE PROPIONATE 50 MCG
2 SPRAY, SUSPENSION (ML) NASAL DAILY
Status: DISCONTINUED | OUTPATIENT
Start: 2021-08-16 | End: 2021-08-18 | Stop reason: HOSPADM

## 2021-08-16 RX ORDER — DEXTROSE MONOHYDRATE 50 MG/ML
100 INJECTION, SOLUTION INTRAVENOUS PRN
Status: DISCONTINUED | OUTPATIENT
Start: 2021-08-16 | End: 2021-08-18 | Stop reason: HOSPADM

## 2021-08-16 RX ORDER — ONDANSETRON 2 MG/ML
4 INJECTION INTRAMUSCULAR; INTRAVENOUS EVERY 6 HOURS PRN
Status: DISCONTINUED | OUTPATIENT
Start: 2021-08-16 | End: 2021-08-18 | Stop reason: HOSPADM

## 2021-08-16 RX ORDER — NICOTINE POLACRILEX 4 MG
15 LOZENGE BUCCAL PRN
Status: DISCONTINUED | OUTPATIENT
Start: 2021-08-16 | End: 2021-08-18 | Stop reason: HOSPADM

## 2021-08-16 RX ORDER — ONDANSETRON 4 MG/1
4 TABLET, ORALLY DISINTEGRATING ORAL EVERY 8 HOURS PRN
Status: DISCONTINUED | OUTPATIENT
Start: 2021-08-16 | End: 2021-08-18 | Stop reason: HOSPADM

## 2021-08-16 RX ORDER — ACETAMINOPHEN 325 MG/1
650 TABLET ORAL EVERY 6 HOURS PRN
Status: DISCONTINUED | OUTPATIENT
Start: 2021-08-16 | End: 2021-08-18 | Stop reason: HOSPADM

## 2021-08-16 RX ORDER — SODIUM CHLORIDE 9 MG/ML
INJECTION, SOLUTION INTRAVENOUS CONTINUOUS
Status: DISCONTINUED | OUTPATIENT
Start: 2021-08-16 | End: 2021-08-18 | Stop reason: HOSPADM

## 2021-08-16 RX ORDER — 0.9 % SODIUM CHLORIDE 0.9 %
1000 INTRAVENOUS SOLUTION INTRAVENOUS ONCE
Status: COMPLETED | OUTPATIENT
Start: 2021-08-16 | End: 2021-08-16

## 2021-08-16 RX ORDER — ACETAMINOPHEN 650 MG/1
650 SUPPOSITORY RECTAL EVERY 6 HOURS PRN
Status: DISCONTINUED | OUTPATIENT
Start: 2021-08-16 | End: 2021-08-18 | Stop reason: HOSPADM

## 2021-08-16 RX ORDER — VITAMIN B COMPLEX
1 TABLET ORAL DAILY
COMMUNITY

## 2021-08-16 RX ADMIN — SODIUM CHLORIDE 1000 ML: 9 INJECTION, SOLUTION INTRAVENOUS at 12:17

## 2021-08-16 RX ADMIN — ONDANSETRON HYDROCHLORIDE 4 MG: 2 INJECTION, SOLUTION INTRAMUSCULAR; INTRAVENOUS at 12:18

## 2021-08-16 RX ADMIN — OXYBUTYNIN CHLORIDE 5 MG: 5 TABLET ORAL at 21:32

## 2021-08-16 RX ADMIN — NYSTATIN 500000 UNITS: 500000 SUSPENSION ORAL at 21:33

## 2021-08-16 RX ADMIN — ENOXAPARIN SODIUM 40 MG: 40 INJECTION SUBCUTANEOUS at 17:55

## 2021-08-16 RX ADMIN — SODIUM CHLORIDE: 9 INJECTION, SOLUTION INTRAVENOUS at 17:54

## 2021-08-16 RX ADMIN — ACETAMINOPHEN 650 MG: 325 TABLET ORAL at 17:54

## 2021-08-16 RX ADMIN — SODIUM CHLORIDE, PRESERVATIVE FREE 10 ML: 5 INJECTION INTRAVENOUS at 21:33

## 2021-08-16 RX ADMIN — NYSTATIN 5 ML: 500000 SUSPENSION ORAL at 13:46

## 2021-08-16 RX ADMIN — NYSTATIN 500000 UNITS: 500000 SUSPENSION ORAL at 17:55

## 2021-08-16 ASSESSMENT — ENCOUNTER SYMPTOMS
DIARRHEA: 0
NAUSEA: 1
ABDOMINAL PAIN: 0
COLOR CHANGE: 0
SHORTNESS OF BREATH: 0
VOMITING: 0
RHINORRHEA: 0
CONSTIPATION: 0
SORE THROAT: 0

## 2021-08-16 NOTE — ED PROVIDER NOTES
I independently examined and evaluated Breonna Louis. In brief, Breonna Louis is a 80 y.o. female with a past medical history of diabetes, hypertension, hyperlipidemia, atrial fibrillation who presents to the ED complaining of lightheadedness and generalized weakness and nausea. Patient reports that she had a syncopal episode 3 days ago. She did not have any preceding lightheadedness or vertigo before the event. She was seen at [facility] emergency department and diagnosed with a urinary tract infection. She was started on antibiotics. They did recommend admission at that time the patient declined. She returns today given that symptoms have not improved. She does report significant nausea, generalized myalgias. She denies any chest pain or shortness of breath. She does report an episode of lightheadedness today without syncope. REVIEW OF SYSTEMS  All systems reviewed, pertinent positives per HPI otherwise noted to be negative. Focused exam revealed   PHYSICAL EXAM  /81   Pulse 81   Temp 99 °F (37.2 °C) (Oral)   Resp 16   Ht 5' 2\" (1.575 m)   Wt 123 lb (55.8 kg)   LMP 06/12/1991 (Approximate)   SpO2 97%   BMI 22.50 kg/m²    GENERAL APPEARANCE: Awake and alert. Cooperative. no distress. HENT: Normocephalic. Atraumatic. Mucous membranes are moist, evidence of possible thrush on the tongue. No hemotympanums, blood in the oropharynx, or septal hematoma. NECK: Supple. No cervical spine tenderness to palpation. Full range of motion of the neck without stiffness or pain. EYES: PERRL. EOM's grossly intact. HEART/CHEST: RRR. No murmurs. Chest wall is not tender to palpation. LUNGS: Respirations unlabored. CTAB. Good air exchange. Speaking comfortably in full sentences. ABDOMEN: No tenderness. Soft. Non-distended. No masses. No organomegaly. No guarding or rebound. MUSCULOSKELETAL: No extremity edema. Compartments soft. No deformity. No tenderness in the extremities.   All limits. [ER]   1545 Lipase within normal limits. [ER]      ED Course User Index  [ER] Josiah Tate MD     Based on results of work-up, I am concerned for coronavirus with lightheadedness and syncopal event as well as generalized weakness. At this time, do feel the patient requires admission for further work-up and management. Discussed the patient with hospital team.      CLINICAL IMPRESSION  1. Syncope and collapse    2. COVID-19    3. Thrush, oral    4. History of UTI    5. Nausea        Blood pressure (!) 149/67, pulse 60, temperature 97.4 °F (36.3 °C), temperature source Oral, resp. rate 18, height 5' 2\" (1.575 m), weight 130 lb 4.7 oz (59.1 kg), last menstrual period 06/12/1991, SpO2 96 %, not currently breastfeeding. DISPOSITION  Joce Rothman was admitted in stable condition. All diagnostic, treatment, and disposition decisions were made by myself in conjunction with the advanced practice provider. For all further details of the patient's emergency department visit, please see the advanced practice provider's documentation. Comment: Please note this report has been produced using speech recognition software and may contain errors related to that system including errors in grammar, punctuation, and spelling, as well as words and phrases that may be inappropriate. If there are any questions or concerns please feel free to contact the dictating provider for clarification.         Josiah Tate MD  08/19/21 5968

## 2021-08-16 NOTE — ED PROVIDER NOTES
[address] ED  EMERGENCY DEPARTMENT ENCOUNTER        Pt Name: Pallavi Friday  MRN: [id number]  Ludmila 1940  Dateof evaluation: 8/16/2021  Provider: SARA Jensen - STAN[place]  PCP: Allie Chadwick MD  ED Attending: No att. providers found    [facility]       Chief Complaint   Patient presents with    Nausea     Dx with UTI on Friday at [address] given Abx; nauseated since not eating; doesnt feel good        HISTORY OF PRESENTILLNESS   (Location/Symptom, Timing/Onset, Context/Setting, Quality, Duration, Modifying Factors, Severity)  Note limiting factors. Pallavi Taylor is a 80 y.o. female for generalized weakness. Onset was last few days. Context includes patient states that 3 days ago she had a syncopal episode. Patient states that she felt as if she was going to [employer] called for her  and the next thing she knew she was on the ground and her  was looking at her. Patient denies dizziness or lightheadedness before the event. Patient reports that she was seen at an outside facility and was diagnosed with a UTI and thrush. Patient states she was given antibiotics. Patient states her symptoms have not improved. Patient reports that she is not been able to eat due to her mouth being sore and nauseated. Patient just complains of generalized achiness. Alleviating factors include nothing. Aggravating factors include nothing. Pain is 0/10. Nothing has been used for pain today. Nursing Notes were all reviewed and agreed with or any disagreements were addressed  in the HPI. REVIEW OF SYSTEMS    (2-9 systems for level 4, 10 or more for level 5)     Review of Systems   Constitutional: Negative for fever. HENT: Negative for congestion, rhinorrhea and sore throat. Respiratory: Negative for shortness of breath. Cardiovascular: Negative for chest pain. Gastrointestinal: Positive for nausea.  Negative for abdominal pain, constipation, diarrhea and vomiting. Genitourinary: Negative for decreased urine volume and difficulty urinating. Musculoskeletal: Negative for arthralgias and myalgias. Skin: Negative for color change and rash. Neurological: Positive for syncope. Negative for dizziness and light-headedness. Psychiatric/Behavioral: Negative for agitation. All other systems reviewed and are negative. Positives and Pertinent negatives as per HPI. Except as noted above in the ROS, all other systems were reviewed and negative. 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, grade 1, uterine cancer with myometrial invasion 2 mm out of 19 mm with no cervical involvement, no lymphvascular space invasion, and all pelvic lymph nodes are negative    GERD (gastroesophageal reflux disease)     Hyperlipidemia     Hypertension     Right carpal tunnel syndrome 9/7/2016    Type II or unspecified type diabetes mellitus without mention of complication, not stated as uncontrolled          SURGICAL HISTORY       Past Surgical History:   Procedure Laterality Date    BLADDER SURGERY      tuck    BREAST BIOPSY      CARPAL TUNNEL RELEASE      LEFT HAND    CARPAL TUNNEL RELEASE Right     CHOLECYSTECTOMY      HYSTERECTOMY      CAROLINE AND BSO  2011    he patient underwent her staging procedure consisting of a hysterectomy, bilateral salpingo-oophorectomy, and bilateral pelvic lymph node dissection in March of 2011 under the direction of Dr. Michelle Mon. The patient followed with Dr. Michelle Mon until he moved to another location outside of the city. The patient has had no evidence of disease to date of 7-         CURRENT MEDICATIONS       Previous Medications    AMLODIPINE (NORVASC) 2.5 MG TABLET    Take 1 tablet by mouth daily    ASCORBIC ACID (VITAMIN C) 500 MG TABLET    Take 500 mg by mouth daily.  OTC    ASPIRIN 81 MG TABLET    Take 81 mg by mouth daily. OTC  Indications: LAST DOSE= 03/03/11    ATORVASTATIN (LIPITOR) 10 MG TABLET    Take 1 tablet by mouth daily    AZELASTINE (ASTELIN) 0.1 % NASAL SPRAY    2 sprays by Nasal route    BLOOD GLUCOSE MONITORING SUPPL FATOUMATA    One touch ultra 2 glucose meter    CALCIUM CARBONATE (OSCAL) 500 MG TABS TABLET    Take 500 mg by mouth daily    CIPROFLOXACIN (CIPRO) 500 MG TABLET    Take 1 tablet by mouth 2 times daily for 3 days    FLUTICASONE (FLONASE) 50 MCG/ACT NASAL SPRAY    2 sprays by Nasal route daily    GLUCOSE BLOOD VI TEST STRIPS (ONE TOUCH ULTRA TEST) STRIP    Check glucose daily. Diagnosis code 250.00.     METFORMIN (GLUCOPHAGE) 500 MG TABLET    Take 1 tablet by mouth 2 times daily (with meals)    MISC NATURAL PRODUCTS (OSTEO BI-FLEX ADV DOUBLE ST PO)    Take by mouth    NYSTATIN (MYCOSTATIN) 773970 UNIT/ML SUSPENSION    Take 5 mLs by mouth 4 times daily Swish and spit    OMEGA-3 FATTY ACIDS (OMEGA-3 FISH OIL) 1000 MG CAPS    Take 1 capsule by mouth    ONDANSETRON (ZOFRAN ODT) 4 MG DISINTEGRATING TABLET    Take 1 tablet by mouth every 8 hours as needed for Nausea or Vomiting    OXYBUTYNIN (DITROPAN) 5 MG TABLET    TAKE 1 TABLET BY MOUTH 2 TIMES DAILY    PROBIOTIC PRODUCT (PROBIOTIC ADVANCED PO)    Take by mouth    TRIAMTERENE-HYDROCHLOROTHIAZIDE (DYAZIDE) 37.5-25 MG PER CAPSULE    Take 1 capsule by mouth    VITAMIN D (CHOLECALCIFEROL) 1000 UNIT TABS TABLET    Take 1,000 Units by mouth daily         ALLERGIES     Sulfa antibiotics, Dicyclomine hcl, Diclofenac, Lisinopril, Penicillins, and Sulfamethoxazole-trimethoprim [bactrim]    FAMILY HISTORY       Family History   Problem Relation Age of Onset    Diabetes Mother     Heart Disease Mother     High Blood Pressure Mother     Stroke Father     Diabetes Brother           SOCIAL HISTORY       Social History     Socioeconomic History    Marital status:      Spouse name: None    Number of children: None    Years of education: None    Highest education level: None   Occupational History    None   Tobacco Use    Smoking status: Never Smoker    Smokeless tobacco: Never Used   Substance and Sexual Activity    Alcohol use: No    Drug use: No    Sexual activity: Yes   Other Topics Concern    None   Social History Narrative    None     Social Determinants of Health     Financial Resource Strain:     Difficulty of Paying Living Expenses:    Food Insecurity:     Worried About Running Out of Food in the Last Year:     Ran Out of Food in the Last Year:    Transportation Needs:     Lack of Transportation (Medical):  Lack of Transportation (Non-Medical):    Physical Activity:     Days of Exercise per Week:     Minutes of Exercise per Session:    Stress:     Feeling of Stress :    Social Connections:     Frequency of Communication with Friends and Family:     Frequency of Social Gatherings with Friends and Family:     Attends Mosque Services:     Active Member of Clubs or Organizations:     Attends Club or Organization Meetings:     Marital Status:    Intimate Partner Violence:     Fear of Current or Ex-Partner:     Emotionally Abused:     Physically Abused:     Sexually Abused:        SCREENINGS   NIH Stroke Scale  Interval: Baseline  Level of Consciousness (1a. ): Alert  LOC Questions (1b. ):  Answers both correctly  LOC Commands (1c. ): Performs both tasks correctly  Best Gaze (2. ): Normal  Visual (3. ): No visual loss  Facial Palsy (4. ): Normal symmetrical movement  Motor Arm, Left (5a. ): No drift  Motor Arm, Right (5b. ): No drift  Motor Leg, Left (6a. ): No drift  Motor Leg, Right (6b. ): No drift  Limb Ataxia (7. ): Absent  Sensory (8. ): Normal  Best Language (9. ): No aphasia  Dysarthria (10. ): Normal  Extinction and Inattention (11): No abnormality  Total: 0         PHYSICAL EXAM  (up to 7 for level 4, 8 or more for level 5)     ED Triage Vitals   BP Temp Temp Source Pulse Resp SpO2 Height Weight   08/16/21 1043 08/16/21 1051 08/16/21 1051 08/16/21 1043 08/16/21 1043 08/16/21 1043 08/16/21 1043 08/16/21 1043   135/63 99 °F (37.2 °C) Oral 80 16 97 % 5' 2\" (1.575 m) 123 lb (55.8 kg)       Physical Exam  Constitutional:       Appearance: She is well-developed. HENT:      Head: Normocephalic and atraumatic. Mouth/Throat:     Eyes:      Pupils: Pupils are equal, round, and reactive to light. Cardiovascular:      Rate and Rhythm: Normal rate. Pulmonary:      Effort: Pulmonary effort is normal. No respiratory distress. Breath sounds: Normal breath sounds. Abdominal:      General: There is no distension. Palpations: Abdomen is soft. Tenderness: There is no abdominal tenderness. Musculoskeletal:         General: Normal range of motion. Cervical back: Normal range of motion. Skin:     General: Skin is warm and dry. Coloration: Skin is pale. Neurological:      General: No focal deficit present. Mental Status: She is alert and oriented to person, place, and time.          DIAGNOSTIC RESULTS   LABS:    Labs Reviewed   COVID-19, RAPID - Abnormal; Notable for the following components:       Result Value    SARS-CoV-2, NAAT DETECTED (*)     All other components within normal limits    Narrative:     Bri ROJO tel. [phone],  Truong Courtney rn er, 08/16/2021 14:36, by Janel Chandler  Performed at:  [facility] [address],  [place], [facility]   Phone [phone]   CBC WITH AUTO DIFFERENTIAL - Abnormal; Notable for the following components:    WBC 3.4 (*)     RBC 3.56 (*)     Hemoglobin 11.2 (*)     Hematocrit 33.4 (*)     Platelets 498 (*)     Lymphocytes Absolute 0.9 (*)     All other components within normal limits    Narrative:     Performed at:  [facility]) - [facility] [address],  [place], [facility]   Phone [phone]   COMPREHENSIVE METABOLIC PANEL W/ REFLEX TO MG FOR LOW K - Abnormal; Notable for the following components:    Sodium 131 (*)     Chloride 96 (*)     Calcium 8.2 (*)     Total Protein 6.0 (*)     All other components within normal limits    Narrative:     Performed at:  [facility] [address],  [place], [employer]   Phone [phone]   URINALYSIS - Abnormal; Notable for the following components:    Ketones, Urine 15 (*)     Leukocyte Esterase, Urine TRACE (*)     All other components within normal limits    Narrative:     Performed at:  [facility] [address],  [place], [employer]   Phone [phone]   MICROSCOPIC URINALYSIS - Abnormal; Notable for the following components:    WBC, UA 6-9 (*)     All other components within normal limits    Narrative:     Performed at:  [facility] [address],  [place], [place] [employer]   Phone [phone]   LIPASE    Narrative:     Performed at:  [place] [address],  [place], [facility]   Phone [phone]   TROPONIN    Narrative:     Performed at:  [place] [address],  [place], [facility]   Phone [phone]   PROCALCITONIN    Narrative:     Performed at:  [place] [address],  [place], [place] [employer]   Phone [phone]   LACTIC ACID, PLASMA    Narrative:     Performed at:  [address] [facility] [address],  [place], [employer]   Phone [phone]       All other labs werewithin normal range or not returned as of this dictation. EKG: All EKG's are interpreted by the Emergency Department Physician who either signs or Co-signs this chart in the absence of a cardiologist.  Please see their note for interpretation of EKG. RADIOLOGY:           Interpretation per the Radiologist below, if available at the time of this note:    No orders to display     No results found.       PROCEDURES consultation. The patient and / or the family were informed of the results of any tests, a time was given to answer questions, a plan was proposed and they agreed with plan. FINAL IMPRESSION      1. Syncope and collapse    2. COVID-19    3. Thrush, oral    4. History of UTI    5. Nausea          DISPOSITION/PLAN   DISPOSITION        PATIENT REFERRED TO:  No follow-up provider specified.     DISCHARGE MEDICATIONS:  New Prescriptions    No medications on file       DISCONTINUED MEDICATIONS:  Discontinued Medications    No medications on file              (Please note that portions of this note were completed with a voice recognition program.  Efforts were made to edit the dictations but occasionally words are mis-transcribed.)    SARA Jones CNP (electronically signed)         SARA Jones CNP  08/16/21 1617

## 2021-08-16 NOTE — PROGRESS NOTES
Patient admitted to room 315 from ED. Patient oriented to room, call light, bed rails, phone, lights and bathroom. Patient instructed about the schedule of the day including: vital sign frequency, lab draws, possible tests, frequency of MD and staff rounds, daily weights, I &O's and prescribed diet. Telemetry box in place, patient aware of placement and reason. Bed locked, in lowest position, side rails up 2/4, call light within reach. Recliner Assessment  Patient is able to demonstrate the ability to move from a reclining position to an upright position within the recliner.     4 Eyes Skin Assessment   Patient declines

## 2021-08-16 NOTE — PROGRESS NOTES
Patient states she was diagnosed with oral thrush last week and is to be taking po nystatin 4x daily.  Takes 930a, 130p, 530p 530p

## 2021-08-17 PROBLEM — U07.1 COVID-19: Status: ACTIVE | Noted: 2021-08-17

## 2021-08-17 PROBLEM — I48.91 ATRIAL FIBRILLATION WITH RVR (HCC): Status: ACTIVE | Noted: 2021-08-17

## 2021-08-17 LAB
ANION GAP SERPL CALCULATED.3IONS-SCNC: 9 MMOL/L (ref 3–16)
BUN BLDV-MCNC: 8 MG/DL (ref 7–20)
CALCIUM SERPL-MCNC: 8.5 MG/DL (ref 8.3–10.6)
CHLORIDE BLD-SCNC: 102 MMOL/L (ref 99–110)
CO2: 22 MMOL/L (ref 21–32)
CREAT SERPL-MCNC: <0.5 MG/DL (ref 0.6–1.2)
GFR AFRICAN AMERICAN: >60
GFR NON-AFRICAN AMERICAN: >60
GLUCOSE BLD-MCNC: 101 MG/DL (ref 70–99)
GLUCOSE BLD-MCNC: 102 MG/DL (ref 70–99)
GLUCOSE BLD-MCNC: 114 MG/DL (ref 70–99)
GLUCOSE BLD-MCNC: 129 MG/DL (ref 70–99)
GLUCOSE BLD-MCNC: 93 MG/DL (ref 70–99)
MAGNESIUM: 1.7 MG/DL (ref 1.8–2.4)
ORGANISM: ABNORMAL
PERFORMED ON: ABNORMAL
PERFORMED ON: NORMAL
POTASSIUM REFLEX MAGNESIUM: 3.2 MMOL/L (ref 3.5–5.1)
SODIUM BLD-SCNC: 133 MMOL/L (ref 136–145)
TROPONIN: <0.01 NG/ML
TSH REFLEX: 1.06 UIU/ML (ref 0.27–4.2)
URINE CULTURE, ROUTINE: ABNORMAL

## 2021-08-17 PROCEDURE — 2500000003 HC RX 250 WO HCPCS: Performed by: INTERNAL MEDICINE

## 2021-08-17 PROCEDURE — 36415 COLL VENOUS BLD VENIPUNCTURE: CPT

## 2021-08-17 PROCEDURE — 6370000000 HC RX 637 (ALT 250 FOR IP): Performed by: INTERNAL MEDICINE

## 2021-08-17 PROCEDURE — 84443 ASSAY THYROID STIM HORMONE: CPT

## 2021-08-17 PROCEDURE — 99222 1ST HOSP IP/OBS MODERATE 55: CPT | Performed by: INTERNAL MEDICINE

## 2021-08-17 PROCEDURE — 83735 ASSAY OF MAGNESIUM: CPT

## 2021-08-17 PROCEDURE — 96375 TX/PRO/DX INJ NEW DRUG ADDON: CPT

## 2021-08-17 PROCEDURE — 6370000000 HC RX 637 (ALT 250 FOR IP): Performed by: PHYSICIAN ASSISTANT

## 2021-08-17 PROCEDURE — G0378 HOSPITAL OBSERVATION PER HR: HCPCS

## 2021-08-17 PROCEDURE — 80048 BASIC METABOLIC PNL TOTAL CA: CPT

## 2021-08-17 PROCEDURE — 84484 ASSAY OF TROPONIN QUANT: CPT

## 2021-08-17 PROCEDURE — 2580000003 HC RX 258: Performed by: PHYSICIAN ASSISTANT

## 2021-08-17 PROCEDURE — 99223 1ST HOSP IP/OBS HIGH 75: CPT | Performed by: PHYSICIAN ASSISTANT

## 2021-08-17 PROCEDURE — 6370000000 HC RX 637 (ALT 250 FOR IP): Performed by: PEDIATRICS

## 2021-08-17 PROCEDURE — 96365 THER/PROPH/DIAG IV INF INIT: CPT

## 2021-08-17 PROCEDURE — 93005 ELECTROCARDIOGRAM TRACING: CPT | Performed by: INTERNAL MEDICINE

## 2021-08-17 PROCEDURE — 1200000000 HC SEMI PRIVATE

## 2021-08-17 PROCEDURE — 6360000002 HC RX W HCPCS: Performed by: INTERNAL MEDICINE

## 2021-08-17 RX ORDER — CALCIUM CARBONATE 200(500)MG
750 TABLET,CHEWABLE ORAL 3 TIMES DAILY PRN
Status: DISCONTINUED | OUTPATIENT
Start: 2021-08-17 | End: 2021-08-18 | Stop reason: HOSPADM

## 2021-08-17 RX ORDER — ATORVASTATIN CALCIUM 10 MG/1
10 TABLET, FILM COATED ORAL DAILY
Status: DISCONTINUED | OUTPATIENT
Start: 2021-08-17 | End: 2021-08-18 | Stop reason: HOSPADM

## 2021-08-17 RX ORDER — ASPIRIN 81 MG/1
81 TABLET, CHEWABLE ORAL DAILY
Status: DISCONTINUED | OUTPATIENT
Start: 2021-08-17 | End: 2021-08-18 | Stop reason: HOSPADM

## 2021-08-17 RX ORDER — MAGNESIUM SULFATE 1 G/100ML
INJECTION INTRAVENOUS
Status: DISPENSED
Start: 2021-08-17 | End: 2021-08-17

## 2021-08-17 RX ORDER — POTASSIUM CHLORIDE 20 MEQ/1
TABLET, EXTENDED RELEASE ORAL
Status: DISPENSED
Start: 2021-08-17 | End: 2021-08-17

## 2021-08-17 RX ORDER — MAGNESIUM SULFATE 1 G/100ML
1000 INJECTION INTRAVENOUS ONCE
Status: COMPLETED | OUTPATIENT
Start: 2021-08-17 | End: 2021-08-17

## 2021-08-17 RX ORDER — POTASSIUM CHLORIDE 20 MEQ/1
40 TABLET, EXTENDED RELEASE ORAL ONCE
Status: COMPLETED | OUTPATIENT
Start: 2021-08-17 | End: 2021-08-17

## 2021-08-17 RX ORDER — METOPROLOL TARTRATE 5 MG/5ML
5 INJECTION INTRAVENOUS
Status: DISCONTINUED | OUTPATIENT
Start: 2021-08-17 | End: 2021-08-18 | Stop reason: HOSPADM

## 2021-08-17 RX ADMIN — NYSTATIN 500000 UNITS: 500000 SUSPENSION ORAL at 19:44

## 2021-08-17 RX ADMIN — OXYBUTYNIN CHLORIDE 5 MG: 5 TABLET ORAL at 19:44

## 2021-08-17 RX ADMIN — APIXABAN 5 MG: 5 TABLET, FILM COATED ORAL at 19:43

## 2021-08-17 RX ADMIN — MAGNESIUM SULFATE HEPTAHYDRATE 1000 MG: 1 INJECTION, SOLUTION INTRAVENOUS at 11:34

## 2021-08-17 RX ADMIN — NYSTATIN 500000 UNITS: 500000 SUSPENSION ORAL at 12:11

## 2021-08-17 RX ADMIN — SODIUM CHLORIDE: 9 INJECTION, SOLUTION INTRAVENOUS at 08:13

## 2021-08-17 RX ADMIN — POTASSIUM CHLORIDE 40 MEQ: 1500 TABLET, EXTENDED RELEASE ORAL at 11:32

## 2021-08-17 RX ADMIN — SODIUM CHLORIDE: 9 INJECTION, SOLUTION INTRAVENOUS at 18:45

## 2021-08-17 RX ADMIN — METOPROLOL TARTRATE 25 MG: 25 TABLET, FILM COATED ORAL at 19:43

## 2021-08-17 RX ADMIN — SODIUM CHLORIDE, PRESERVATIVE FREE 10 ML: 5 INJECTION INTRAVENOUS at 19:45

## 2021-08-17 RX ADMIN — FLUTICASONE PROPIONATE 2 SPRAY: 50 SPRAY, METERED NASAL at 08:14

## 2021-08-17 RX ADMIN — CALCIUM CARBONATE (ANTACID) CHEW TAB 500 MG 750 MG: 500 CHEW TAB at 21:47

## 2021-08-17 RX ADMIN — NYSTATIN 500000 UNITS: 500000 SUSPENSION ORAL at 08:14

## 2021-08-17 RX ADMIN — METOPROLOL TARTRATE 5 MG: 5 INJECTION INTRAVENOUS at 04:04

## 2021-08-17 RX ADMIN — METOPROLOL TARTRATE 25 MG: 25 TABLET, FILM COATED ORAL at 16:58

## 2021-08-17 RX ADMIN — NYSTATIN 500000 UNITS: 500000 SUSPENSION ORAL at 16:58

## 2021-08-17 RX ADMIN — ATORVASTATIN CALCIUM 10 MG: 10 TABLET, FILM COATED ORAL at 18:44

## 2021-08-17 RX ADMIN — OXYBUTYNIN CHLORIDE 5 MG: 5 TABLET ORAL at 08:14

## 2021-08-17 RX ADMIN — ASPIRIN 81 MG: 81 TABLET, CHEWABLE ORAL at 18:44

## 2021-08-17 NOTE — ACP (ADVANCE CARE PLANNING)
Advance Care Planning   Healthcare Decision Maker:    Primary Decision Maker: Sonido Henry Spouse - [phone]    Secondary Decision Maker: Mini Carlos - Child - [phone]    Click here to complete Healthcare Decision Makers including selection of the Healthcare Decision Maker Relationship (ie \"Primary\").

## 2021-08-17 NOTE — FLOWSHEET NOTE
08/17/21 1858   Vital Signs   Temp 100 °F (37.8 °C)   Temp Source Oral   Pulse 79   Heart Rate Source Monitor   Resp 18   BP (!) 145/75   BP Location Left upper arm   Patient Position Semi fowlers   Level of Consciousness Alert (0)   MEWS Score 1   Oxygen Therapy   SpO2 97 %   O2 Device None (Room air)   Pt. Resting in bed. Call light in reach. Shift assessment completed see flow sheet. Denies any needs at this time. Will continue to monitor.

## 2021-08-17 NOTE — FLOWSHEET NOTE
08/17/21 0816   Vital Signs   Temp 100.2 °F (37.9 °C)   Temp Source Oral   Pulse 75   Resp 18   /74   BP Location Left upper arm   Level of Consciousness Alert (0)   MEWS Score 1   Oxygen Therapy   SpO2 95 %   O2 Device None (Room air)     Patient resting quietly in bed. No s/s of distress noted. Currently NPO until seen by MD.   Resp even and easy on RA. Shift assessment complete, see flow sheet. Denies needs. Call light in reach. Will monitor.

## 2021-08-17 NOTE — CARE COORDINATION
Case Management Assessment  Initial Evaluation      Patient Name: Christina Wayne  YOB: 1940  Diagnosis: Syncope and collapse [R55]  Nausea [R11.0]  Thrush, oral [B37.0]  History of UTI [Z87.440]  COVID-19 [U07.1]  Date / Time: 8/16/2021 10:32 AM    Admission status/Date: 08/16/2021 Observation   Chart Reviewed: Yes      Patient Interviewed: Yes   Family Interviewed:  No      Hospitalization in the last 30 days:  No      Health Care Decision Maker :   Primary Decision Maker: Myles Chung Spouse - [phone]    Secondary Decision Maker: Angelic Enedina - Child - [phone]    (CM - must 1st enter selection under Navigator - emergency contact- [phone] Relationship and pick relationship)   Who do you trust or have selected to make healthcare decisions for you      Met with: pt   Interview conducted  (bedside/phone): phone call     Current PCP: Allie Ibarra MD    Financial  [employer] Medicare  Precert required for SNF : Y          3 night stay required -  N    ADLS  Support Systems/Care Needs: Spouse/Significant Other, Children, Family Members  Transportation: self    Meal Preparation: self    Housing  Living Arrangements: pt lives at home with her   Steps: 1-2  Intent for return to present living arrangements: Yes  Identified Issues: Efrain Mejia  Active with [address] : No Agency:(Services)  Type of Home Care Services: None  Passport/Waiver : No  :                      Phone Number:    Passport/Waiver Services: n/a          Durable Medical Equiptment   DME Provider: n/a  Equipment:   Walker___Cane___RTS___ BSC___Shower Chair___Hospital Bed___W/C____Other________  02 at ____Liter(s)---wears(frequency)_______ HHN ___ CPAP___ BiPap___   N/A__x__      Home O2 Use :  No    If No for home O2---if presently on O2 during hospitalization:  No  if yes CM to follow for potential DC O2 need  Informed of need for care provider to bring portable home O2 tank on day of discharge for nursing to connect prior to leaving:   Not Indicated  Verbalized agreement/Understanding:   Not Indicated    Community Service Affiliation  Dialysis:  No    · Agency:  · Location:  · Dialysis Schedule:  · Phone:   · Fax: Other Community Services: n/a    DISCHARGE PLAN: Explained Case Management role/services. Chart review completed. Called and spoke with pt via call to bedside phone due to covid isolation. Pt stated she is independent at home and plans on returning home. She reported no DME, HHC, o2, or community services. She does not anticipate needing [facility] for RN/PT/OT when writer inquired for discharge. She denied needs or questions for CM    Pt will need to be walked prior to discharge as pt is covid +. CM will follow. Please notify CM if needs or concerns arise.

## 2021-08-17 NOTE — FLOWSHEET NOTE
08/16/21 1911   Vital Signs   Temp 98.5 °F (36.9 °C)   Temp Source Oral   Pulse 72   Resp 18   /71   BP Location Left upper arm   Patient Position Semi fowlers   Level of Consciousness Alert (0)   MEWS Score 1   Oxygen Therapy   SpO2 97 %   O2 Device None (Room air)   Pt. Resting in bed. Call light in reach. Shift assessment completed see flow sheet. Denies any needs at this time. Will continue to monitor.

## 2021-08-17 NOTE — CONSULTS
CARDIOLOGY CONSULTATION        Patient Name: Rebecca Burks  Date of admission: 8/16/2021 10:32 AM  Admission Dx: Syncope and collapse [R55]  Nausea [R11.0]  Thrush, oral [B37.0]  History of UTI [Z87.440]  COVID-19 [U07.1]  Requesting Physician: Daniel Muñoz MD  Primary Care physician: Suzanna Brunner, MD    Reason for Consultation/Chief Complaint: Syncope, AF    History of Present Illness:     Rebecca Burks is a 80 y.o. patient with prior history notable for hyperlipidemia, hypertension, diabetes type 2, who presented to the hospital 8/16/2021 with complaints of generalized weakness and recent syncopal episode. ED course/Vital signs on presentation: Temperature 99, heart rate 80 bpm, /63, sats 97% on room air. Chest x-ray was negative for acute pathology. CT head noted no acute abnormality. Old  left cerebellar infarct noted. EKG showed normal sinus rhythm with no ischemic changes. Labs notable for pancytopenia, sodium 131, rapid COVID-19 test was positive. Serial cardiac enzymes negative. Upon admission on the floor patient was noted to have a brief episode of atrial fibrillation with rapid ventricular rates lasting approximately 2 hours in the early a.m. EKG showed atrial fibrillation with heart rate 141 bpm and nonspecific ST abnormality. Noted to be running fever last evening into the early a.m. hours. Patient is evaluated this afternoon. She notes she was diagnosed with thrush and UTI outside facility and had been receiving antibiotics. No she was unable to take adequate p.o. intake due to mouth soreness and nausea and just general fatigue and weakness. She knew she was dehydrated. She endorses episode of syncope 3 days ago. She got up from bed walk to the kitchen and was walking back when she felt that she may pass out. Noted lightheaded feeling, called out to her , next and she knew she woke and had blacked out.   Noted no chest pain or palpitations prior to going down. No recurrence in the interim. States she has passed out the past.  Denies any chest pressure or heaviness. Denies any palpitations or heart racing at the time of atrial fibrillation overnight. Denies paroxysmal nocturnal dyspnea, orthopnea, bendopnea, increasing lower extremity edema or weight gain. Presently requiring no oxygen. Seen by [facility] in the past for benign essential Hypertension - notes not able to be viewed at this time from 2017. Past Medical History:   has a past medical history of Arthritis of left knee, COVID-19, Endometrioid adenocarcinoma, GERD (gastroesophageal reflux disease), Hyperlipidemia, Hypertension, Right carpal tunnel syndrome, and Type II or unspecified type diabetes mellitus without mention of complication, not stated as uncontrolled. Surgical History:   has a past surgical history that includes Cholecystectomy; Bladder surgery; Carpal tunnel release; halima and bso (cervix removed) (2011); Carpal tunnel release (Right); Breast biopsy; and Hysterectomy. Social History:   reports that she has never smoked. She has never used smokeless tobacco. She reports that she does not drink alcohol and does not use drugs. Family History:  family history includes Diabetes in her brother and mother; Heart Disease in her mother; High Blood Pressure in her mother; Stroke in her father. Home Medications:  Were reviewed and are listed in nursing record and/or below  Prior to Admission medications    Medication Sig Start Date End Date Taking?  Authorizing Provider   acyclovir (ZOVIRAX) 800 MG tablet Take 400 mg by mouth 2 times daily    Yes Historical Provider, MD   Coenzyme Q10 (COQ10) 100 MG CAPS Take 1 capsule by mouth daily   Yes Historical Provider, MD   Biotin 10 MG tablet Take 10 mg by mouth daily   Yes Historical Provider, MD   ciprofloxacin (CIPRO) 500 MG tablet Take 1 tablet by mouth 2 times daily for 3 days 8/14/21 8/17/21 Yes Estuardo Cui Ian Billings MD   nystatin (MYCOSTATIN) 706746 UNIT/ML suspension Take 5 mLs by mouth 4 times daily Swish and spit 8/14/21  Yes Dani Wilkerson MD   azelastine (ASTELIN) 0.1 % nasal spray 2 sprays by Nasal route 10/30/19  Yes Historical Provider, MD   calcium carbonate (OSCAL) 500 MG TABS tablet Take 500 mg by mouth daily   Yes Historical Provider, MD   vitamin D (CHOLECALCIFEROL) 1000 UNIT TABS tablet Take 1,000 Units by mouth every other day    Yes Historical Provider, MD   Probiotic Product (PROBIOTIC ADVANCED PO) Take by mouth   Yes Historical Provider, MD   oxybutynin (DITROPAN) 5 MG tablet TAKE 1 TABLET BY MOUTH 2 TIMES DAILY 5/2/17  Yes SARA Carr - CNP   Omega-3 Fatty Acids (OMEGA-3 FISH OIL) 1000 MG CAPS Take 1 capsule by mouth 6/11/16  Yes Historical Provider, MD   Misc Natural Products (OSTEO BI-FLEX ADV DOUBLE ST PO) Take by mouth   Yes Historical Provider, MD   metFORMIN (GLUCOPHAGE) 500 MG tablet Take 1 tablet by mouth 2 times daily (with meals) 6/2/15  Yes Jess Bartholomew MD   atorvastatin (LIPITOR) 10 MG tablet Take 1 tablet by mouth daily  Patient taking differently: Take 5 mg by mouth daily  6/2/15  Yes Jess Bartholomew MD   amLODIPine (NORVASC) 2.5 MG tablet Take 1 tablet by mouth daily 6/2/15  Yes Jess Bartholomew MD   fluticasone (FLONASE) 50 MCG/ACT nasal spray 2 sprays by Nasal route daily 6/2/15  Yes Jess Bartholomew MD   aspirin 81 MG tablet Take 81 mg by mouth daily. OTC  Indications: LAST DOSE= 03/03/11   Yes Historical Provider, MD   glucose blood VI test strips (ONE TOUCH ULTRA TEST) strip Check glucose daily.  Diagnosis code 250.00. 6/2/15   Jess Bartholomew MD   Blood Glucose Monitoring Suppl FATOUMATA One touch ultra 2 glucose meter 6/28/11   Sandro Fernández MD        CURRENT Medications:  metoprolol (LOPRESSOR) injection 5 mg, Q1H PRN  magnesium sulfate 1-5 GM/100ML-% IVPB (premix),   potassium chloride (KLOR-CON M) 20 MEQ extended release tablet,   fluticasone (FLONASE) 50 MCG/ACT nasal spray 2 spray, Daily  oxybutynin (DITROPAN) tablet 5 mg, BID  sodium chloride flush 0.9 % injection 5-40 mL, 2 times per day  sodium chloride flush 0.9 % injection 5-40 mL, PRN  0.9 % sodium chloride infusion, PRN  enoxaparin (LOVENOX) injection 40 mg, Daily  ondansetron (ZOFRAN-ODT) disintegrating tablet 4 mg, Q8H PRN   Or  ondansetron (ZOFRAN) injection 4 mg, Q6H PRN  polyethylene glycol (GLYCOLAX) packet 17 g, Daily PRN  acetaminophen (TYLENOL) tablet 650 mg, Q6H PRN   Or  acetaminophen (TYLENOL) suppository 650 mg, Q6H PRN  perflutren lipid microspheres (DEFINITY) injection 1.65 mg, ONCE PRN  insulin lispro (HUMALOG) injection vial 0-6 Units, TID WC  insulin lispro (HUMALOG) injection vial 0-3 Units, Nightly  glucose (GLUTOSE) 40 % oral gel 15 g, PRN  dextrose 50 % IV solution, PRN  glucagon (rDNA) injection 1 mg, PRN  dextrose 5 % solution, PRN  0.9 % sodium chloride infusion, Continuous  nystatin (MYCOSTATIN) [id number] UNIT/ML suspension 500,000 Units, 4x Daily        Allergies:  Sulfa antibiotics, Dicyclomine hcl, Diclofenac, Lisinopril, Penicillins, and Sulfamethoxazole-trimethoprim [bactrim]     Review of Systems:   A 14 point review of symptoms completed. Pertinent positives identified in the HPI, all other review of symptoms negative as below. Objective:     Vitals:    08/17/21 0215 08/17/21 0406 08/17/21 0415 08/17/21 0816   BP: 135/72 131/66 129/81 137/74   Pulse: 72 134 80 75   Resp: 18 18 18 18   Temp: 98.9 °F (37.2 °C) 99.3 °F (37.4 °C) 100.2 °F (37.9 °C) 100.2 °F (37.9 °C)   TempSrc: Oral Oral Oral Oral   SpO2: 97% 94% 97% 95%   Weight: 129 lb 13.6 oz (58.9 kg)      Height:          Weight: 129 lb 13.6 oz (58.9 kg)       PHYSICAL EXAM:    General:   Elderly woman, no acute distress   Head:  Normocephalic, atraumatic   Eyes:  Conjunctiva/corneas clear, anicteric sclerae    Nose: Nares normal, no drainage or sinus tenderness   Throat: No abnormalities of the lips, oral mucosa or tongue.     Neck: 05/16/2014    CHOL 119 10/31/2013     Lab Results   Component Value Date    TRIG 146 06/02/2015    TRIG 165 (H) 05/16/2014    TRIG 115 10/31/2013     Lab Results   Component Value Date    HDL 42 06/02/2015    HDL 48 05/16/2014    HDL 36 (L) 10/31/2013     Lab Results   Component Value Date    LDLCALC 47 06/02/2015    LDLCALC 62 05/16/2014    LDLCALC 60 10/31/2013     Lab Results   Component Value Date    LABVLDL 29 06/02/2015    LABVLDL 33 05/16/2014    LABVLDL 23 10/31/2013     No results found for: CHOLHDLRATIO     TSH within normal limits 8/17/2020:    Cardiac Data:     EKG: Personally reviewed, interpretation as above. Telemetry personally reviewed, noted episode atrial fibrillation with rates up to 160s spanning 230 to 4:15 AM.  Abrupt offset. Maintaining sinus rhythm since. No prior cardiac studies. Additional studies:   Carotid US 1/2020  Impression:        o No hemodynamically significant lesions are identified in the right and        left internal or common carotid arteries. Bilateral antegrade vertebral        flow.      o Thyroid nodules noted. Impression and Plan:      1. Atrial fibrillation with rapid ventricular response, paroxysmal, new diagnosis  2. Syncope, suspect in the setting of dehydration with poor p.o. intake and infection  3. COVID-19 pneumonia  4. Hypertension, controlled  5. Pancytopenia   6. Hyperlipidemia  7.   Diabetes mellitus type 2    [id number] Score for Atrial Fibrillation Stroke Risk   Risk   Factors  Component Value   C CHF No 0   H HTN Yes 1   A2 Age >= 76 Yes,  (77 y.o.) 2   D DM Yes 1   S2 Prior Stroke/TIA No 0   V Vascular Disease No 0   A Age 74-69 No,  (77 y.o.) 0   Sc Sex female 1    [id number]  Score  5   Score last updated 8/17/21 2:96 PM EDT    Click here for a link to the UpToDate guideline \"Atrial Fibrillation: Anticoagulation therapy to prevent embolization    Disclaimer: Risk Score calculation is dependent on accuracy of patient problem list and past encounter diagnosis. Patient Active Problem List   Diagnosis    Diabetes mellitus without complication (HCC)    Hypertension    Hyperlipidemia    Post-menopausal bleeding    Endometrioid adenocarcinoma    Acid reflux    History of uterine cancer    Osteopenia    Closed displaced fracture of lateral malleolus of right fibula    Right ankle pain    Right carpal tunnel syndrome    Foot callus    Hammer toe of left foot    Acute pain of left knee    Syncope and collapse       PLAN:  1. Recommend low-dose beta-blocker twice daily if tolerated. Will monitor hemodynamics closely with infection. 2.  Recommend anticoagulation for stroke prophylaxis given high [id number] and COVID-19 which may predispose to thromboembolic events. Patient was briefed on indication, alternatives, and has agreed to proceed. 3.  Aggressive treatment of fever as may trigger arrhythmia; consider scheduling Tylenol therapy  4. Oxygen as indicated for hypoxia  5. Antimicrobial therapy/supportive treatment for COVID-19 per hospitalist service  6. Defer echocardiogram at this time until recovery from COVID-19    We will schedule her in the office with me in a few weeks following recovery from COVID-19 illness. Please notify her service should she have breakthrough atrial fibrillation during the index admission and we are happy to reevaluate her case. Will sign off. I will address the patient's cardiac risk factors and adjusted pharmacologic treatment as needed. In addition, I have reinforced the need for patient directed risk factor modification. All questions and concerns were addressed to the patient/family. Alternatives to my treatment were discussed. Thank you for allowing us to participate in the care of Jaswinder Nguyen. Please call me with any questions [phone].     Pauly Buckley MD, [address] [facility]  Cardiovascular Disease  [place] [address]  [phone] [facility]  [phone] Kiersten Darnell Office  8/17/2021 12:59 PM

## 2021-08-17 NOTE — PROGRESS NOTES
Pt. Heart rhythm changed from sinus to A-fib on tele monitor. Pt. Has no history of a-fib. EKG order placed per protocol for irregular rhythm. EKG results show a-fib RVR. Dr. Ginger Gutierrez called and informed. New order for 5 mg metoprolol PRN 1 h for heart rate >130. Given and 10 minutes later converted normal sinus with PVC.

## 2021-08-17 NOTE — PLAN OF CARE
Problem: Airway Clearance - Ineffective  Goal: Achieve or maintain patent airway  Outcome: Ongoing     Problem: Gas Exchange - Impaired  Goal: Absence of hypoxia  Outcome: Ongoing  Goal: Promote optimal lung function  Outcome: Ongoing     Problem: Breathing Pattern - Ineffective  Goal: Ability to achieve and maintain a regular respiratory rate  Outcome: Ongoing     Problem:  Body Temperature -  Risk of, Imbalanced  Goal: Ability to maintain a body temperature within defined limits  Outcome: Ongoing  Goal: Will regain or maintain usual level of consciousness  Outcome: Ongoing  Goal: Complications related to the disease process, condition or treatment will be avoided or minimized  Outcome: Ongoing     Problem: Isolation Precautions - Risk of Spread of Infection  Goal: Prevent transmission of infection  Outcome: Ongoing     Problem: Nutrition Deficits  Goal: Optimize nutritional status  Outcome: Ongoing     Problem: Risk for Fluid Volume Deficit  Goal: Maintain normal heart rhythm  Outcome: Ongoing  Goal: Maintain absence of muscle cramping  Outcome: Ongoing  Goal: Maintain normal serum potassium, sodium, calcium, phosphorus, and pH  Outcome: Ongoing     Problem: Loneliness or Risk for Loneliness  Goal: Demonstrate positive use of time alone when socialization is not possible  Outcome: Ongoing     Problem: Fatigue  Goal: Verbalize increase energy and improved vitality  Outcome: Ongoing     Problem: Patient Education: Go to Patient Education Activity  Goal: Patient/Family Education  Outcome: Ongoing     Problem: Falls - Risk of:  Goal: Will remain free from falls  Description: Will remain free from falls  Outcome: Ongoing  Goal: Absence of physical injury  Description: Absence of physical injury  Outcome: Ongoing     Problem: SAFETY  Goal: Free from accidental physical injury  Outcome: Ongoing  Goal: Free from intentional harm  Outcome: Ongoing     Problem: DAILY CARE  Goal: Daily care needs are met  Outcome: Ongoing Problem: PAIN  Goal: Patient's pain/discomfort is manageable  Outcome: Ongoing     Problem: SKIN INTEGRITY  Goal: Skin integrity is maintained or improved  Outcome: Ongoing

## 2021-08-17 NOTE — H&P
Hospital Medicine History & Physical      PCP: Suzanna Brunner, MD    Date of Admission: 8/16/2021    Date of Service: Pt seen/examined on 8/17/2021     Chief Complaint:    Chief Complaint   Patient presents with    Nausea     Dx with UTI on Friday at [address] given Abx; nauseated since not eating; doesnt feel good      History Of Present Illness: The patient is a 80 y.o. female with DM2, HTN, HLD, RLS, CVA, cervical CA who presented to [facility] ED with complaint of illness. Patient reports she started feeling unwell about 1 week ago. She reports feeling fatigued, nauseated, having a poor appetite, and a very mild cough. She had an episode of syncope on 8/14/2021 and was evaluated in the ER. At that time she was told she had a UTI and sent home on an antibiotic. After returning home she had no recurrent episodes of syncope, no chest pain, no dyspnea. She continued to feel fatigued, unwell, low-grade fevers. She returned to the ER for evaluation where she was found to be COVID-19 positive. She was not hypoxic.   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    history of stage I-A, grade 1, uterine cancer with myometrial invasion 2 mm out of 19 mm with no cervical involvement, no lymphvascular space invasion, and all pelvic lymph nodes are negative    GERD (gastroesophageal reflux disease)     Hyperlipidemia     Hypertension     Right carpal tunnel syndrome 09/07/2016    Type II or unspecified type diabetes mellitus without mention of complication, not stated as uncontrolled        Past Surgical History:        Procedure Laterality Date    BLADDER SURGERY      tuck    BREAST BIOPSY      CARPAL TUNNEL RELEASE      LEFT HAND    CARPAL TUNNEL RELEASE Right     CHOLECYSTECTOMY      HYSTERECTOMY      CAROLINE AND BSO  2011    he patient underwent her staging procedure consisting mg by mouth daily  6/2/15  Yes Som Mcnair MD   amLODIPine (NORVASC) 2.5 MG tablet Take 1 tablet by mouth daily 6/2/15  Yes Som Mcnair MD   fluticasone (FLONASE) 50 MCG/ACT nasal spray 2 sprays by Nasal route daily 6/2/15  Yes Som Mcnair MD   aspirin 81 MG tablet Take 81 mg by mouth daily. OTC  Indications: LAST DOSE= 03/03/11   Yes Historical Provider, MD   glucose blood VI test strips (ONE TOUCH ULTRA TEST) strip Check glucose daily. Diagnosis code 250.00. 6/2/15   Som Mcnair MD   Blood Glucose Monitoring Suppl FATOUMATA One touch ultra 2 glucose meter 6/28/11   Buddy Boland MD       Allergies:  Sulfa antibiotics, Dicyclomine hcl, Diclofenac, Lisinopril, Penicillins, and Sulfamethoxazole-trimethoprim [bactrim]    Social History:  The patient currently lives at home with her      TOBACCO:   reports that she has never smoked. She has never used smokeless tobacco.  ETOH:   reports no history of alcohol use. Family History:   Positive as follows:        Problem Relation Age of Onset    Diabetes Mother     Heart Disease Mother     High Blood Pressure Mother     Stroke Father     Diabetes Brother        REVIEW OF SYSTEMS:       Constitutional: +for fever , fatigue, poor appetite  HENT: Negative for sore throat   Eyes: Negative for redness   Respiratory: Negative  for dyspnea, +cough   Cardiovascular: Negative for chest pain   Gastrointestinal: Negative for vomiting, diarrhea   + nausea   Genitourinary: Negative for hematuria   Musculoskeletal: Negative for arthralgias   Skin: Negative for rash   Neurological: +for syncope   Hematological: Negative for adenopathy   Psychiatric/Behavorial: Negative for anxiety    PHYSICAL EXAM:    /74   Pulse 75   Temp 100.2 °F (37.9 °C) (Oral)   Resp 18   Ht 5' 2\" (1.575 m)   Wt 129 lb 13.6 oz (58.9 kg)   LMP 06/12/1991 (Approximate)   SpO2 95%   BMI 23.75 kg/m²     Gen: No distress. Alert. Eyes: PERRL. No sclera icterus.  No conjunctival injection. ENT: No discharge. Pharynx clear. Neck: No JVD. Trachea midline. Resp: No accessory muscle use. No crackles. No wheezes. No rhonchi. CV: Regular rate. Regular rhythm. No murmur. No rub. No edema. GI: Non-tender. Non-distended. No masses. No organomegaly. Normal bowel sounds. No hernia. Skin: Warm and dry. No nodule on exposed extremities. No rash on exposed extremities. M/S: No cyanosis. No joint deformity. No clubbing. Neuro: Awake. Grossly nonfocal    Psych: Oriented x 3. No anxiety or agitation. CBC:   Recent Labs     08/16/21  1209   WBC 3.4*   HGB 11.2*   HCT 33.4*   MCV 93.8   *     BMP:   Recent Labs     08/16/21  1209 08/17/21  0520   * 133*   K 3.9 3.2*   CL 96* 102   CO2 22 22   BUN 9 8   CREATININE 0.6 <0.5*     LIVER PROFILE:   Recent Labs     08/16/21  1209   AST 22   ALT 16   LIPASE 15.0   BILITOT 0.4   ALKPHOS 69     PT/INR: No results for input(s): PROTIME, INR in the last 72 hours. APTT: No results for input(s): APTT in the last 72 hours.   UA:  Recent Labs     08/16/21  1315   COLORU Yellow   PHUR 6.0   WBCUA 6-9*   RBCUA None seen   CLARITYU Clear   SPECGRAV <=1.005   LEUKOCYTESUR TRACE*   UROBILINOGEN 0.2   BILIRUBINUR Negative   BLOODU Negative   GLUCOSEU Negative          CARDIAC ENZYMES  Recent Labs     08/16/21  1730 08/16/21  2117 08/17/21  0520   TROPONINI <0.01 <0.01 <0.01       U/A:    Lab Results   Component Value Date    NITRITE positive 12/20/2010    COLORU Yellow 08/16/2021    WBCUA 6-9 08/16/2021    WBCUA 3-4 01/28/2015    RBCUA None seen 08/16/2021    MUCUS Trace 01/28/2015    BACTERIA 4+ 08/14/2021    CLARITYU Clear 08/16/2021    SPECGRAV <=1.005 08/16/2021    LEUKOCYTESUR TRACE 08/16/2021    BLOODU Negative 08/16/2021    GLUCOSEU Negative 08/16/2021       ABG  No results found for: LKZ8LRX, BEART, B7VGTICA, PHART, THGBART, LAS1FBS, PO2ART, LRH2ILA    CULTURES  COVID 19, rapid: POSITIVE     EKG:  I have reviewed the EKG with the following interpretation:   ER EKG: NSR  EKG after admission; A Fib RVR    RADIOLOGY  No orders to display       Pertinent previous results reviewed   CT head 8/14/21  Impression   No acute intracranial abnormality.       Old infarction in the posteroinferior aspect of the left cerebellar   hemisphere.       Mild acute or chronic inflammatory disease of the anterior paranasal sinuses. CXR 8/14/21  Impression   No acute disease. ASSESSMENT/PLAN:    #Syncope  - occurred on 8/14/21. Suspect it was 2/2 poor PO intake and acute illness with COVID 19  - since admission she has gone in and out of a fib on tele- see below- she has been asymptomatic  - echo to be completed when recovered from acute infection  - trop trend negative   - not orthostatic  - cont IVF for now     #Atrial fibrillation with RVR- resolved  - in SR at time of my exam  - a fib is new diagnosis  - echo to be completed when recovered from acute infection  - TSH 1.06  - appreciate cardio input  - Eliquis 5 mg BID started for CVA prevention  - Lopressor 25 mg BID started     #COVID 19 infection   - she has not been vaccinated  - symptoms started 8/10/21 and include low grade fever, mild cough, poor appetite  - not hypoxic  - cont supportive care    #Hypokalemia  #Hypomagnesemia  - replaced    #Lymphopenia   #Thrombocytopenia   - could be 2/2 acute viral infection. Monitor CBC    #Recent E Coli UTI  - diagnosed 8/14/21  - culture and sensitivity reviewed, she was appropriately treated with 3 day course of ciprofloxacin     #DM2  - hold metformin for now   - use SSI    #HTN  - BP stable, hold dyazide and norvasc   - Lopressor 25 mg BID added as above. May not need both dyazide and norvasc on discharge     #CVA  - per chart review- cont ASA and statin. Will need to discuss use of both ASA and Eliquis with patient prior to discharge      #Cervical CA- remote history- she had a hysterectomy     DVT Prophylaxis: Lovenox   Diet: ADULT DIET;  Regular; 4 carb choices (60 gm/meal)  Code Status: Full Code    Dispo: cont care. Monitor on tele overnight.   Can likely dc to home on 8/18 with OP cardio follow up     Jeremy Phillip PA-C  8/17/2021 2:36 PM

## 2021-08-18 VITALS
HEIGHT: 62 IN | WEIGHT: 130.29 LBS | OXYGEN SATURATION: 96 % | BODY MASS INDEX: 23.98 KG/M2 | RESPIRATION RATE: 18 BRPM | DIASTOLIC BLOOD PRESSURE: 67 MMHG | TEMPERATURE: 97.4 F | HEART RATE: 60 BPM | SYSTOLIC BLOOD PRESSURE: 149 MMHG

## 2021-08-18 LAB
ANION GAP SERPL CALCULATED.3IONS-SCNC: 10 MMOL/L (ref 3–16)
BASOPHILS ABSOLUTE: 0 K/UL (ref 0–0.2)
BASOPHILS RELATIVE PERCENT: 0.3 %
BUN BLDV-MCNC: 9 MG/DL (ref 7–20)
CALCIUM SERPL-MCNC: 8.1 MG/DL (ref 8.3–10.6)
CHLORIDE BLD-SCNC: 103 MMOL/L (ref 99–110)
CO2: 19 MMOL/L (ref 21–32)
CREAT SERPL-MCNC: <0.5 MG/DL (ref 0.6–1.2)
EKG ATRIAL RATE: 144 BPM
EKG DIAGNOSIS: NORMAL
EKG Q-T INTERVAL: 306 MS
EKG QRS DURATION: 76 MS
EKG QTC CALCULATION (BAZETT): 468 MS
EKG R AXIS: 37 DEGREES
EKG T AXIS: 85 DEGREES
EKG VENTRICULAR RATE: 141 BPM
EOSINOPHILS ABSOLUTE: 0 K/UL (ref 0–0.6)
EOSINOPHILS RELATIVE PERCENT: 0.2 %
GFR AFRICAN AMERICAN: >60
GFR NON-AFRICAN AMERICAN: >60
GLUCOSE BLD-MCNC: 100 MG/DL (ref 70–99)
GLUCOSE BLD-MCNC: 107 MG/DL (ref 70–99)
GLUCOSE BLD-MCNC: 164 MG/DL (ref 70–99)
HCT VFR BLD CALC: 32.4 % (ref 36–48)
HEMOGLOBIN: 10.8 G/DL (ref 12–16)
LYMPHOCYTES ABSOLUTE: 1.3 K/UL (ref 1–5.1)
LYMPHOCYTES RELATIVE PERCENT: 39.4 %
MAGNESIUM: 1.8 MG/DL (ref 1.8–2.4)
MCH RBC QN AUTO: 31.7 PG (ref 26–34)
MCHC RBC AUTO-ENTMCNC: 33.4 G/DL (ref 31–36)
MCV RBC AUTO: 95 FL (ref 80–100)
MONOCYTES ABSOLUTE: 0.3 K/UL (ref 0–1.3)
MONOCYTES RELATIVE PERCENT: 9.7 %
NEUTROPHILS ABSOLUTE: 1.6 K/UL (ref 1.7–7.7)
NEUTROPHILS RELATIVE PERCENT: 50.4 %
PDW BLD-RTO: 13.3 % (ref 12.4–15.4)
PERFORMED ON: ABNORMAL
PERFORMED ON: ABNORMAL
PLATELET # BLD: 127 K/UL (ref 135–450)
PMV BLD AUTO: 7.5 FL (ref 5–10.5)
POTASSIUM SERPL-SCNC: 3.7 MMOL/L (ref 3.5–5.1)
RBC # BLD: 3.41 M/UL (ref 4–5.2)
SODIUM BLD-SCNC: 132 MMOL/L (ref 136–145)
WBC # BLD: 3.2 K/UL (ref 4–11)

## 2021-08-18 PROCEDURE — 83735 ASSAY OF MAGNESIUM: CPT

## 2021-08-18 PROCEDURE — 6370000000 HC RX 637 (ALT 250 FOR IP): Performed by: PHYSICIAN ASSISTANT

## 2021-08-18 PROCEDURE — 6370000000 HC RX 637 (ALT 250 FOR IP): Performed by: INTERNAL MEDICINE

## 2021-08-18 PROCEDURE — 93010 ELECTROCARDIOGRAM REPORT: CPT | Performed by: INTERNAL MEDICINE

## 2021-08-18 PROCEDURE — 2580000003 HC RX 258: Performed by: PHYSICIAN ASSISTANT

## 2021-08-18 PROCEDURE — 85025 COMPLETE CBC W/AUTO DIFF WBC: CPT

## 2021-08-18 PROCEDURE — 80048 BASIC METABOLIC PNL TOTAL CA: CPT

## 2021-08-18 PROCEDURE — 99238 HOSP IP/OBS DSCHRG MGMT 30/<: CPT | Performed by: INTERNAL MEDICINE

## 2021-08-18 PROCEDURE — 36415 COLL VENOUS BLD VENIPUNCTURE: CPT

## 2021-08-18 RX ADMIN — APIXABAN 5 MG: 5 TABLET, FILM COATED ORAL at 08:41

## 2021-08-18 RX ADMIN — NYSTATIN 500000 UNITS: 500000 SUSPENSION ORAL at 13:53

## 2021-08-18 RX ADMIN — METOPROLOL TARTRATE 25 MG: 25 TABLET, FILM COATED ORAL at 08:41

## 2021-08-18 RX ADMIN — NYSTATIN 500000 UNITS: 500000 SUSPENSION ORAL at 08:41

## 2021-08-18 RX ADMIN — OXYBUTYNIN CHLORIDE 5 MG: 5 TABLET ORAL at 08:41

## 2021-08-18 RX ADMIN — ACETAMINOPHEN 650 MG: 325 TABLET ORAL at 01:51

## 2021-08-18 RX ADMIN — ATORVASTATIN CALCIUM 10 MG: 10 TABLET, FILM COATED ORAL at 08:41

## 2021-08-18 RX ADMIN — ASPIRIN 81 MG: 81 TABLET, CHEWABLE ORAL at 08:41

## 2021-08-18 RX ADMIN — FLUTICASONE PROPIONATE 2 SPRAY: 50 SPRAY, METERED NASAL at 08:41

## 2021-08-18 RX ADMIN — SODIUM CHLORIDE, PRESERVATIVE FREE 10 ML: 5 INJECTION INTRAVENOUS at 08:41

## 2021-08-18 RX ADMIN — SODIUM CHLORIDE: 9 INJECTION, SOLUTION INTRAVENOUS at 08:37

## 2021-08-18 ASSESSMENT — PAIN SCALES - GENERAL: PAINLEVEL_OUTOF10: 0

## 2021-08-18 NOTE — CARE COORDINATION
Patient still in hospital awaiting ride home. Discharging today per notes. Will schedule call for 08/19/2021.   [facility]  [phone]

## 2021-08-18 NOTE — FLOWSHEET NOTE
08/18/21 0145   Vital Signs   Temp 101.9 °F (38.8 °C)   Temp Source Oral   Pulse 70   Heart Rate Source Monitor   Resp 18   /79   BP Location Left upper arm   Level of Consciousness Alert (0)   MEWS Score 3   Oxygen Therapy   SpO2 96 %   O2 Device None (Room air)   Temp 101.9 tylenol given for temp.

## 2021-08-18 NOTE — PROGRESS NOTES
Physician Progress Note      PATIENT:               Roseline Laughlin  CSN #:                  [id number]  :                       1940  ADMIT DATE:       2021 10:32 AM  DISCH DATE:  RESPONDING  PROVIDER #:        Aida Wellington MD          QUERY TEXT:    Pt admitted with COVID-19 and noted to have wbc-3.4, pancytopenia, and temp-up   to 101.9. If possible, please document in progress notes and discharge   summary if you are evaluating and/or treating: The medical record reflects the following:  Risk Factors: dehydration, covid-19  Clinical Indicators: wbc-3.4, pancytopenia, Temp up to 101.9, normal lactic   and PCT  Treatment: lactic, pct, 1000 NS bolus, Tylenol    Thank you for your assistance,  Charo Anderson RN,BSN,CCDS,CRCR  Options provided:  -- Sepsis present on admission due to COVID-19 infection  -- Sepsis not present on admission due to COVID-19 infection  -- Covid-19 infection without sepsis  -- Other - I will add my own diagnosis  -- Disagree - Not applicable / Not valid  -- Disagree - Clinically unable to determine / Unknown  -- Refer to Clinical Documentation Reviewer    PROVIDER RESPONSE TEXT:    This patient has Covid-19 infection without sepsis. Query created by: Mary Carlson on 2021 9:13 AM      QUERY TEXT:    Pt admitted with syncope suspect 2/2 poor po and acute illness with Covid 19. Noted documentation of Covid pneumonia per cardiology consultant on .   If   possible, please document in progress notes and discharge summary:      The medical record reflects the following:  Risk Factors: advanced age, covid, nausea/vomiting  Clinical Indicators: no chest xray, no hypoxia, per cardiology PN on :    COVID-19 pneumonia  Treatment: supportive care, isolation    Thank you for your assistance,  Sandra Cortez RN,BSN,CCDS,CRCR  Options provided:  -- Covid pneumonia confirmed present on admission  -- Covid pneumonia ruled out  -- Other - I will add my own

## 2021-08-18 NOTE — CARE COORDINATION
DISCHARGE ORDER  Date/Time 2021 1:41 PM  Completed by: Beverley Matamoros RN, Case Management    Patient Name: Paolo Ortiz      : 1940  Admitting Diagnosis: Syncope and collapse [R55]  Nausea [R11.0]  Thrush, oral [B37.0]  History of UTI [Z87.440]  COVID-19 [U07.1]  Atrial fibrillation with RVR ([address] [I48.91]      Admit order Date and Status:2021 inpt  (verify MD's last order for status of admission)      Noted discharge order. If applicable PT/OT recommendation at Discharge: n/a  DME recommendation by PT/OT:n/a  Confirmed discharge plan  (pt): Yes  with whom____________pt___  If pt confirmed DC plan does family need to be contacted by CM No if yes who___n/a___  Discharge Plan: Reviewed chart. Role of discharge planner explained and patient verbalized understanding. Discharge order is noted. Pt is being d/c'd to home today. Pt's O2 sats are 96% on RA. Pt declines HC. CM spoke with pt and she is aware that Eliquis is recommended and [facility] will be sending it to her, as she agrees. She is aware that after the first 30 days, the out of pocket cost is $47/month per Katherinen Pale with [facility]. Pt states that she can afford. No further discharge needs needed or noted. C19 pos as of     Pt was given a pulee ox from RN, [place], and shown how to use. Pt declines HC. Reviewed chart. Role of discharge planner explained and patient verbalized understanding. Discharge order is noted. Has Home O2 in place on admit:  No  Informed of need to bring portable home O2 tank on day of discharge for nursing to connect prior to leaving:   Not Indicated  Verbalized agreement/Understanding:   Not Indicated  . Discharge timeout done with NANDO [place]. All discharge needs and concerns addressed.

## 2021-08-18 NOTE — PROGRESS NOTES
Patient educated on discharge instructions as well as new medications use, dosage, administration and possible side effects. Patient verified knowledge. IV removed without difficulty and dry dressing in place. Telemetry monitor removed and returned to [facility]. Currently waiting for ride home.

## 2021-08-18 NOTE — DISCHARGE SUMMARY
Name:  Jason Levin  Room:  /9692-61  MRN:    [id number]    Discharge Summary      This discharge summary is in conjunction with a complete physical exam done on the day of discharge. Attending Physician: Dr. Godwin Smalls  Discharging Physician: Dr. Mendoza Few: 8/16/2021  Discharge:   8/18/2021    HPI:  The patient is a 80 y.o. female with DM2, HTN, HLD, RLS, CVA, cervical CA who presented to [facility] ED with complaint of illness. Patient reports she started feeling unwell about 1 week ago. She reports feeling fatigued, nauseated, having a poor appetite, and a very mild cough. She had an episode of syncope on 8/14/2021 and was evaluated in the ER. At that time she was told she had a UTI and sent home on an antibiotic. After returning home she had no recurrent episodes of syncope, no chest pain, no dyspnea. She continued to feel fatigued, unwell, low-grade fevers. She returned to the ER for evaluation where she was found to be COVID-19 positive. She was not hypoxic. She was admitted for further eval     Diagnoses this Admission and Hospital Course   #Syncope  - occurred on 8/14/21.   Suspect it was 2/2 poor PO intake and acute illness with COVID 19  - since admission she has gone in and out of a fib on tele- see below- she has been asymptomatic  - echo to be completed when recovered from acute infection  - trop trend negative   - not orthostatic  - continued IVF for now      #Atrial fibrillation with RVR- resolved  - in SR at time of my exam  - a fib is new diagnosis  - echo to be completed when recovered from acute infection  - TSH 1.06  - appreciated cardio input  - Eliquis 5 mg BID started for CVA prevention  - Lopressor 25 mg BID started   F/u OP with cardiology     #COVID 19 infection   - she has not been vaccinated  - symptoms started 8/10/21 and include low grade fever, mild cough, poor appetite  - not hypoxic  - cont'd supportive care     #Hypokalemia  #Hypomagnesemia  - 08/16/21  1209   AST 22   ALT 16   LIPASE 15.0   BILITOT 0.4   ALKPHOS 69     UA:  Recent Labs     08/16/21  1315   COLORU Yellow   PHUR 6.0   WBCUA 6-9*   RBCUA None seen   CLARITYU Clear   SPECGRAV <=1.005   LEUKOCYTESUR TRACE*   UROBILINOGEN 0.2   BILIRUBINUR Negative   BLOODU Negative   GLUCOSEU Negative       CARDIAC ENZYMES  Recent Labs     08/16/21  1730 08/16/21  2117 08/17/21  0520   TROPONINI <0.01 <0.01 <0.01         CULTURES  COVID 19, rapid: POSITIVE     RADIOLOGY  No orders to display     Pertinent previous results reviewed   CT head 8/14/21  Impression   No acute intracranial abnormality.       Old infarction in the posteroinferior aspect of the left cerebellar   hemisphere.       Mild acute or chronic inflammatory disease of the anterior paranasal sinuses.      CXR 8/14/21  Impression   No acute disease. Discharge Medications     Medication List      START taking these medications    apixaban 5 MG Tabs tablet  Commonly known as: ELIQUIS  Take 1 tablet by mouth 2 times daily  Notes to patient: Apixaban (Eliquis®)  Use: prevents the blood from clotting, to prevent a stroke. Side effects: bleeding or bruising more easily, headache, and stomach upset.      metoprolol tartrate 25 MG tablet  Commonly known as: LOPRESSOR  Take 1 tablet by mouth 2 times daily  Notes to patient: Beta Blockers   Use: treat heart failure, prevent future heart attacks, lower blood pressure and heart rate, treat chest pain  Side effects: Dizziness, fatigue and diarrhea        CHANGE how you take these medications    atorvastatin 10 MG tablet  Commonly known as: LIPITOR  Take 1 tablet by mouth daily  What changed: how much to take        CONTINUE taking these medications    acyclovir 800 MG tablet  Commonly known as: ZOVIRAX     azelastine 0.1 % nasal spray  Commonly known as: ASTELIN     Biotin 10 MG tablet     blood glucose monitor kit and supplies  One touch ultra 2 glucose meter     blood glucose test strips strip  Commonly known as: ONE TOUCH ULTRA TEST  Check glucose daily. Diagnosis code 250.00.     calcium carbonate 500 MG Tabs tablet  Commonly known as: OSCAL     CoQ10 100 MG Caps     fluticasone 50 MCG/ACT nasal spray  Commonly known as: Flonase  2 sprays by Nasal route daily     metFORMIN 500 MG tablet  Commonly known as: GLUCOPHAGE  Take 1 tablet by mouth 2 times daily (with meals)     nystatin 540750 UNIT/ML suspension  Commonly known as: MYCOSTATIN  Take 5 mLs by mouth 4 times daily Swish and spit     omega-3 fish oil 1000 MG Caps     OSTEO BI-FLEX ADV DOUBLE ST PO     oxybutynin 5 MG tablet  Commonly known as: DITROPAN  TAKE 1 TABLET BY MOUTH 2 TIMES DAILY     PROBIOTIC ADVANCED PO     vitamin D 1000 UNIT Tabs tablet  Commonly known as: CHOLECALCIFEROL        STOP taking these medications    amLODIPine 2.5 MG tablet  Commonly known as: NORVASC     aspirin 81 MG tablet     ciprofloxacin 500 MG tablet  Commonly known as: CIPRO           Where to Get Your Medications      These medications were sent to [id number] [place] [address], [place] [address]    Phone: [phone]   · apixaban 5 MG Tabs tablet  · metoprolol tartrate 25 MG tablet           Discharged in stable condition to home. Follow Up: Follow up with PCP. Total time spent on discharge is 35 minutes  ROSA Novak.

## 2021-08-19 ENCOUNTER — CARE COORDINATION (OUTPATIENT)
Dept: CASE MANAGEMENT | Age: 81
End: 2021-08-19

## 2021-08-19 NOTE — CARE COORDINATION
Patient contacted regarding COVID-19 diagnosis. Discussed COVID-19 related testing which was available at this time. COVID-19 Detected on 8/16/2021. Patient informed of results, if available? Yes    Call within 2 business days of discharge: Yes  Discharge Date: 8/18/21   RARS: Readmission Risk Score: 16      Was this an external facility discharge? No Discharge Facility:     Care Transition Nurse contacted the family by telephone to perform post discharge assessment. Provided introduction to self, and explanation of the CTN role, and reason for call due to risk factors for infection and/or exposure to COVID-19. Symptoms reviewed with family who verbalized the following symptoms: nausea and no worsening symptoms. Due to no new or worsening symptoms encounter was not routed to provider for escalation. Discussed follow-up appointments. If no appointment was previously scheduled, appointment scheduling offered: Yes  [facility] follow up appointment(s):   Future Appointments   Date Time Provider Romero Field   9/22/2021 11:30 AM Marianna Lucero MD P [facility]     Non-[facility] follow up appointment(s): PCP    Non-face-to-face services provided:  Obtained and reviewed discharge summary and/or continuity of care documents  Education of patient/family/caregiver/guardian to support self-management-s/s monitor; when to seek emergency services  Assessment and support for treatment adherence and medication management-med review    Advance Care Planning:   Does patient have an Advance Directive:  decision maker updated. Educated patient about risk for severe COVID-19 due to risk factors according to CDC guidelines. CTN reviewed discharge instructions, medical action plan and red flag symptoms family who verbalized understanding. Discussed COVID vaccination status Yes. Education provided on COVID-19 vaccination as appropriate. Discussed exposure protocols and quarantine with CDC Guidelines.  Family was given an opportunity to verbalize any questions and concerns and agrees to contact the CTN or health care provider for questions related to their healthcare. Reviewed and educated family on any new and changed medications related to discharge diagnosis. Was patient discharged with a pulse oximeter? Yes Discussed and confirmed pulse oximeter discharge instructions and when to notify provider or seek emergency care. Spoke with daughter, patient present. Reports underlying nausea. Recommended they contact PCP office for prn nausea medication such as Zofran. States the nausea is from trying to cough up phlegm. CTN recommended OTC Mucinex or generic equivalent as well as DB&C exercises hourly. Daughter voices understanding. Discussed skilled home care vs home health aide services via senior services vs private pay. Offered resources: [employer], [employer], "[employer]. Daughter will consider and call CTN back if needs assistance with referrals. She is aware that if they want skilled [place] they should contact PCP office for recommendation and orders. CTN will confirm confirm with cardiology if ASA 81mg should be stopped as discharge summary states \"cont ASA and statin\". CTN provided contact information for future needs. Outreach to [facility]o Alfaro office. Per Katelyn, continue Eliquis, stop ASA. Daughter and patient notified with understanding. Plan for follow-up call in 7-14 days based on severity of symptoms and risk factors.     Josiane Coburn RN  Care Transitions Nurse  [phone] mobile

## 2021-08-26 ENCOUNTER — CARE COORDINATION (OUTPATIENT)
Dept: CASE MANAGEMENT | Age: 81
End: 2021-08-26

## 2021-08-26 NOTE — CARE COORDINATION
Patient contacted regarding COVID-19 diagnosis. COVID-19 Detected on 2021. Care Transition Nurse contacted the patient by telephone to perform follow-up assessment. Verified name and  with family as identifiers. Symptoms reviewed with patient who verbalized the following symptoms: fatigue, cough, no new symptoms and no worsening symptoms. Due to no new or worsening symptoms encounter was not routed to provider for escalation. Educated patient about risk for severe COVID-19 due to risk factors according to CDC guidelines. CTN reviewed medical action plan and red flag symptoms with patient who verbalized understanding. Discussed COVID vaccination status Yes. Education provided on COVID-19 vaccination as appropriate. Discussed exposure protocols and quarantine with CDC Guidelines. Patient was given an opportunity to verbalize any questions and concerns and agrees to contact the CTN or health care provider for questions related to their healthcare. Was patient discharged with a pulse oximeter? Yes - monitoring closely and maintaining 93-97% RA. Spoke with spouse mostly, patient in background. States she continues with weakness, poor endurance and no appetite. Only drinking [employer] three times daily and fluid intake. Spouse also recovering from [place]. CTN encouraged physical therapy, active ROM and DB&C exercises at rest, change positions slowly. Reviewed s/s to call doctor or seek medical attention. Offered referrals for CSS or COA which he declined at this time stating he wants to discuss it with her before making decision. Denies needs otherwise. Encouraged to continue to monitor SaO2 as well. So far, in goal range and no issues with breathing. CTN provided contact information for future reference. Plan for follow-up call in 5-7 days based on severity of symptoms and risk factors.     Tonya Birch RN  Care Transition Nurse  [phone] mobile    Future Appointments   Date Time Provider Romero Field   9/22/2021 11:30 AM Felipe Raines MD [facility]

## 2021-09-08 ENCOUNTER — CARE COORDINATION (OUTPATIENT)
Dept: CASE MANAGEMENT | Age: 81
End: 2021-09-08

## 2021-09-08 NOTE — CARE COORDINATION
Patient contacted regarding COVID-19 diagnosis. COVID-19 Detected on 8/16/2021. Unable to reach or leave message at this time. Phone rings without answer.      Iker Hui, RN  Care Transition Nurse  [phone] mobile    Future Appointments   Date Time Provider Romero Field   9/22/2021 11:30 AM [place] MD [facility]

## 2021-09-15 ENCOUNTER — CARE COORDINATION (OUTPATIENT)
Dept: CASE MANAGEMENT | Age: 81
End: 2021-09-15

## 2021-09-15 NOTE — CARE COORDINATION
Patient resolved from the [address] Transitions episode on 9/13/2021. COVID-19 Detected on 8/16/2021. 2nd attempt - unable to reach or leave message. No further outreach scheduled with this CTN. Episode of Care resolved. Patient has this CTN contact information if future needs arise.     Vita Montalvo RN  Care Transition Nurse  [phone] mobile    Future Appointments   Date Time Provider Romero Franzi   9/22/2021 11:30 AM Faye Oconnell MD [facility]

## 2021-09-17 ENCOUNTER — CARE COORDINATION (OUTPATIENT)
Dept: CASE MANAGEMENT | Age: 81
End: 2021-09-17

## 2021-09-17 NOTE — CARE COORDINATION
Patient contacted regarding COVID-19 diagnosis. CTN received voice mail from pharmacist at [facility]. Patient contacted pharmacist asking for assistance with medications new at discharge (Eliquis and metoprolol) because her PCP refused to refill them. Outreach to patient at listed numbers. Home number rang to fax. Mobile number rang to voice mail. Message left instructing patient to contact Dr Hair for refills including his office number ([phone]) and reminder of appt date/time as below. No further CTN outreach scheduled.      Junie Acuña, NANDO  Care Transition Nurse  [phone] mobile    Future Appointments   Date Time Provider Romero Field   9/22/2021 11:30 AM Tres Starks MD [facility]

## 2021-09-20 NOTE — PROGRESS NOTES
removed) (2011); Carpal tunnel release (Right); Breast biopsy; and Hysterectomy. Social History:   reports that she has never smoked. She has never used smokeless tobacco. She reports that she does not drink alcohol and does not use drugs. Family History:  family history includes Diabetes in her brother and mother; Heart Disease in her mother; High Blood Pressure in her mother; Stroke in her father. Home Medications:  Were reviewed and are listed in nursing record and/or below  Prior to Admission medications    Medication Sig Start Date End Date Taking?  Authorizing Provider   apixaban (ELIQUIS) 5 MG TABS tablet Take 1 tablet by mouth 2 times daily 8/18/21  Yes Rolan Crow MD   metoprolol tartrate (LOPRESSOR) 25 MG tablet Take 1 tablet by mouth 2 times daily 8/18/21  Yes Rolan Crow MD   acyclovir (ZOVIRAX) 800 MG tablet Take 400 mg by mouth 2 times daily    Yes Historical Provider, MD   Coenzyme Q10 (COQ10) 100 MG CAPS Take 1 capsule by mouth daily   Yes Historical Provider, MD   Biotin 10 MG tablet Take 10 mg by mouth daily   Yes Historical Provider, MD   nystatin (MYCOSTATIN) 382912 UNIT/ML suspension Take 5 mLs by mouth 4 times daily Swish and spit 8/14/21  Yes Macario Hills MD   azelastine (ASTELIN) 0.1 % nasal spray 2 sprays by Nasal route 10/30/19  Yes Historical Provider, MD   calcium carbonate (OSCAL) 500 MG TABS tablet Take 500 mg by mouth daily   Yes Historical Provider, MD   vitamin D (CHOLECALCIFEROL) 1000 UNIT TABS tablet Take 1,000 Units by mouth every other day    Yes Historical Provider, MD   Probiotic Product (PROBIOTIC ADVANCED PO) Take by mouth   Yes Historical Provider, MD   oxybutynin (DITROPAN) 5 MG tablet TAKE 1 TABLET BY MOUTH 2 TIMES DAILY 5/2/17  Yes SARA De La Torre CNP   Omega-3 Fatty Acids (OMEGA-3 FISH OIL) 1000 MG CAPS Take 1 capsule by mouth 6/11/16  Yes Historical Provider, MD   Misc Natural Products (OSTEO BI-FLEX ADV DOUBLE ST PO) Take by mouth   Yes Historical Provider, MD   metFORMIN (GLUCOPHAGE) 500 MG tablet Take 1 tablet by mouth 2 times daily (with meals) 6/2/15  Yes Nato Saldivar MD   atorvastatin (LIPITOR) 10 MG tablet Take 1 tablet by mouth daily  Patient taking differently: Take 5 mg by mouth daily  6/2/15  Yes Nato Saldivar MD   glucose blood VI test strips (ONE TOUCH ULTRA TEST) strip Check glucose daily. Diagnosis code 250.00. 6/2/15  Yes Nato Saldivar MD   fluticasone (FLONASE) 50 MCG/ACT nasal spray 2 sprays by Nasal route daily 6/2/15  Yes Nato Saldivar MD   Blood Glucose Monitoring Suppl FATOUMATA One touch ultra 2 glucose meter 6/28/11  Yes Justin Leonardo MD        CURRENT Medications:  No current facility-administered medications for this visit. Allergies:  Sulfa antibiotics, Dicyclomine hcl, Diclofenac, Lisinopril, Penicillins, and Sulfamethoxazole-trimethoprim [bactrim]     Review of Systems:   A 14 point review of symptoms completed. Pertinent positives identified in the HPI, all other review of symptoms negative as below. Objective:     Vitals:    09/22/21 1129   BP: (!) 140/62   Pulse: 68   Temp:    SpO2: 98%    Weight: 125 lb 1.9 oz (56.8 kg)       PHYSICAL EXAM:    General:  Alert, cooperative, no distress, appears stated age   Head:  Normocephalic, atraumatic   Eyes:  Conjunctiva/corneas clear, anicteric sclerae    Nose: Nares normal, no drainage or sinus tenderness   Throat: No abnormalities of the lips, oral mucosa or tongue. Moist mucous membranes. Neck: Trachea midline. Neck supple with no lymphadenopathy, thyroid not enlarged, symmetric, no tenderness/mass/nodules, no Jugular venous pressure elevation    Lungs:   Clear to auscultation bilaterally, no wheezes, no rales, no respiratory distress   Chest Wall:  No deformity or tenderness to palpation   Heart:  Regular rate and rhythm, normal S1, normal S2, no murmur, no rub, no S3/S4, PMI non-displaced.     Abdomen:   Soft, non-tender, with normoactive bowel sounds. No masses, no hepatosplenomegaly   Extremities: No cyanosis, clubbing. Trace pitting edema BLE. Varicosities bilaterally. Vascular: 2+ radial, 1+ dorsalis pedis and posterior tibial pulses bilaterally. Brisk carotid upstrokes without carotid bruit. Skin: Skin color, texture, turgor are normal with no rashes or ulceration. Pysch: Euthymic mood, appropriate affect   Neurologic: Oriented to person, place and time. No slurred speech or facial asymmetry. No motor or sensory deficits on gross examination.            Labs:   CBC:   Lab Results   Component Value Date    WBC 3.2 08/18/2021    RBC 3.41 08/18/2021    HGB 10.8 08/18/2021    HCT 32.4 08/18/2021    MCV 95.0 08/18/2021    RDW 13.3 08/18/2021     08/18/2021     CMP:  Lab Results   Component Value Date     08/18/2021    K 3.7 08/18/2021    K 3.2 08/17/2021     08/18/2021    CO2 19 08/18/2021    BUN 9 08/18/2021    CREATININE <0.5 08/18/2021    GFRAA >60 08/18/2021    GFRAA >60 05/30/2013    AGRATIO 1.6 08/16/2021    LABGLOM >60 08/18/2021    GLUCOSE 100 08/18/2021    PROT 6.0 08/16/2021    PROT 7.0 02/15/2012    CALCIUM 8.1 08/18/2021    BILITOT 0.4 08/16/2021    ALKPHOS 69 08/16/2021    AST 22 08/16/2021    ALT 16 08/16/2021     PT/INR:  No results found for: PTINR  HgBA1c:  Lab Results   Component Value Date    LABA1C 6.1 06/02/2015     Lab Results   Component Value Date    CKTOTAL 89 06/02/2015    TROPONINI <0.01 08/17/2021     Lab Results   Component Value Date    CHOL 118 06/02/2015    CHOL 143 05/16/2014    CHOL 119 10/31/2013     Lab Results   Component Value Date    TRIG 146 06/02/2015    TRIG 165 (H) 05/16/2014    TRIG 115 10/31/2013     Lab Results   Component Value Date    HDL 42 06/02/2015    HDL 48 05/16/2014    HDL 36 (L) 10/31/2013     Lab Results   Component Value Date    LDLCALC 47 06/02/2015    LDLCALC 62 05/16/2014    LDLCALC 60 10/31/2013     Lab Results   Component Value Date    LABVLDL 29 06/02/2015 adenocarcinoma    Acid reflux    History of uterine cancer    Osteopenia    Closed displaced fracture of lateral malleolus of right fibula    Right ankle pain    Right carpal tunnel syndrome    Foot callus    Hammer toe of left foot    Acute pain of left knee    Syncope and collapse    Atrial fibrillation with RVR ([address]    COVID-19    History of UTI    History of COVID-19         PLAN:  1. Patient is demonstrated proclivity for atrial fibrillation in the setting of infection. She has significant risk factors for recurrence of atrial fibrillation. For these reasons would advise she continue to take oral anticoagulant for stroke prophylaxis. She continues on metoprolol twice daily, tolerating well. 2. Will obtain transthoracic echocardiogram for evaluation of underlying cardiac structure and function. Plan to follow up in 6 months or sooner if needed. Call with results of echo as a return    This note was scribed in the presence of Dulce Michaels MD by Deborrah Cabot RN. The scribes documentation has been prepared under my direction and personally reviewed by me in its entirety. I confirm that the note above accurately reflects all work, treatment, procedures, and medical decision making performed by me. Megan Yarbrough MD, personally performed the services described in this documentation as scribed by Deborrah Cabot RN. in my presence, and it is both accurate and complete to the best of our ability. I will address the patient's cardiac risk factors and adjusted pharmacologic treatment as needed. In addition, I have reinforced the need for patient directed risk factor modification. All questions and concerns were addressed to the patient/family. Alternatives to my treatment were discussed. Thank you for allowing us to participate in the care of Amada Person. Please call me with any questions [phone].     Licha Garrison MD, [facility]  Cardiovascular Disease  [facility]  [phone] [facility]  [phone] [address]  9/22/2021 11:40 AM

## 2021-09-22 ENCOUNTER — OFFICE VISIT (OUTPATIENT)
Dept: CARDIOLOGY CLINIC | Age: 81
End: 2021-09-22
Payer: MEDICARE

## 2021-09-22 VITALS
TEMPERATURE: 97.7 F | SYSTOLIC BLOOD PRESSURE: 140 MMHG | BODY MASS INDEX: 23.02 KG/M2 | HEART RATE: 68 BPM | HEIGHT: 62 IN | OXYGEN SATURATION: 98 % | DIASTOLIC BLOOD PRESSURE: 62 MMHG | WEIGHT: 125.12 LBS

## 2021-09-22 DIAGNOSIS — R55 SYNCOPE AND COLLAPSE: ICD-10-CM

## 2021-09-22 DIAGNOSIS — I10 HYPERTENSION, UNSPECIFIED TYPE: ICD-10-CM

## 2021-09-22 DIAGNOSIS — Z86.16 HISTORY OF COVID-19: ICD-10-CM

## 2021-09-22 DIAGNOSIS — I48.91 ATRIAL FIBRILLATION WITH RVR (HCC): Primary | ICD-10-CM

## 2021-09-22 DIAGNOSIS — E78.5 HYPERLIPIDEMIA, UNSPECIFIED HYPERLIPIDEMIA TYPE: ICD-10-CM

## 2021-09-22 PROCEDURE — 99214 OFFICE O/P EST MOD 30 MIN: CPT | Performed by: INTERNAL MEDICINE

## 2021-09-22 NOTE — PATIENT INSTRUCTIONS
1.  With Afib you have an increase risk of stroke. Eliquis is for stroke prevention. Metoprolol is to control heart rate and decrease episodes. Recommend that you continue both. 2. Call [phone] to schedule ECHOCARDIOGRAM    Plan to follow up in 6 months or sooner if needed.

## 2021-09-23 DIAGNOSIS — I48.91 ATRIAL FIBRILLATION, UNSPECIFIED TYPE (HCC): ICD-10-CM

## 2021-09-23 DIAGNOSIS — Z86.16 HISTORY OF COVID-19: ICD-10-CM

## 2021-09-23 DIAGNOSIS — R55 SYNCOPE, UNSPECIFIED SYNCOPE TYPE: Primary | ICD-10-CM

## 2021-10-14 ENCOUNTER — HOSPITAL ENCOUNTER (OUTPATIENT)
Dept: NON INVASIVE DIAGNOSTICS | Age: 81
Discharge: HOME | End: 2021-10-14
Payer: MEDICARE

## 2021-10-14 DIAGNOSIS — I48.91 ATRIAL FIBRILLATION, UNSPECIFIED TYPE (HCC): ICD-10-CM

## 2021-10-14 DIAGNOSIS — R55 SYNCOPE, UNSPECIFIED SYNCOPE TYPE: ICD-10-CM

## 2021-10-14 DIAGNOSIS — Z86.16 HISTORY OF COVID-19: ICD-10-CM

## 2021-10-14 LAB
LV EF: 55 %
LVEF MODALITY: NORMAL

## 2021-10-14 PROCEDURE — 93306 TTE W/DOPPLER COMPLETE: CPT

## 2021-10-15 ENCOUNTER — TELEPHONE (OUTPATIENT)
Dept: CARDIOLOGY CLINIC | Age: 81
End: 2021-10-15

## 2021-10-15 NOTE — TELEPHONE ENCOUNTER
----- Message from Linda Baker MD sent at 10/14/2021  6:08 PM EDT -----  Please let the patient know, I reviewed her echocardiogram results. Normal heart pump strength. Persistent hole in the heart between the upper chambers (patent foramen ovale) which is seen in up to 25% the population. I do not suspect that this is in any way related to her care at this time. Incidental finding. Pls let me know if additional questions or concerns. Continue current therapies and follow-up as planned.

## 2022-01-13 ENCOUNTER — TELEPHONE (OUTPATIENT)
Dept: CARDIOLOGY CLINIC | Age: 82
End: 2022-01-13

## 2022-01-13 NOTE — TELEPHONE ENCOUNTER
Called and spoke with pt, pt denied any Sob, dizziness, palps, cp, advised her to call her PCP for further instructions, pt v/u

## 2022-01-13 NOTE — TELEPHONE ENCOUNTER
Pt. Having discomfort in forearm bilateral. Started [id number]. Pt. States has indigestion with burping. Taking tums/gas pill. Please advise.

## 2022-02-07 NOTE — PROGRESS NOTES
Hershall Sings    Age [de-identified] y.o.    female    1940    MRN [id number]    2/17/2022  Arrival Time_____________  OR Time____________[address]     Procedure(s):  ENDOSCOPIC RETROGRADE CHOLANGIOPANCREATOGRAPHY                      General     Surgeon(s):  Usman Kwok, MD      DAY ADMIT ___  SDS/OP ___  OUTPT IN BED ___         Phone [phone] (home)    PCP _____________________ Phone_________________ Epic ( ) Epic CE ( ) Appt ________    ADDITIONAL INFO __________________________________ Cardio/Consult _____________    NOTES _____________________________________________________________________    ____________________________________________________________________________    PAT APPT DATE:________ TIME: ________  FAXED QAD: _______  (__) H&P w/ hospitalist  ____________________________________________________________________________    COVID TEST: Date/Location______________        NURSING HISTORY COMPLETE: _______  (__) CBC       (__) W/ DIFF ___________  (__)  ECHO    __________  (__) Hgb A1C    ___________  (__) CHEST X RAY   __________  (__) LIPID PROFILE  ___________  (__) EKG   __________  (__) PT/PTT   ___________  (__) PFT's   __________  (__) BMP   ___________  (__) CAROTIDS  __________  (__) CMP   ___________  (__) VEIN MAPPING  __________  (__) U/A   ___________  (__) HISTORY & PHYSICAL __________  (__) URINE C & S  ___________  (__) CARDIAC CLEARANCE __________  (__) U/A W/ FLEX  ___________  (__) PULM.  CLEARANCE __________  (__) SERUM PREGNANCY ___________  (__) Check Epic DOS orders __________  (__) TYPE & SCREEN ________ repeat ( ) (__)  __________________ __________  (__) ALBUMIN   ___________  (__)  __________________ __________  (__) TRANSFERRIN  ___________  (__)  __________________ __________  (__) LIVER PROFILE  ___________  (__)  __________________ __________  (__) CARBOXY HGB  ___________  (__) URINE PREG DOS __________  (__) NICOTINE & MET.  ___________  (__) BLOOD SUGAR DOS __________  (__) PREALBUMIN  ___________    (__) MRSA NASAL SWAB ___________  (__) BLOOD THINNERS __________  (__) ACE/ ARBS: _____________________    (__) BETABLOCKERS ___________________

## 2022-02-09 ENCOUNTER — TELEPHONE (OUTPATIENT)
Dept: CARDIOLOGY CLINIC | Age: 82
End: 2022-02-09

## 2022-02-09 DIAGNOSIS — I48.91 ATRIAL FIBRILLATION WITH RVR (HCC): Primary | ICD-10-CM

## 2022-02-09 RX ORDER — OMEPRAZOLE 40 MG/1
40 CAPSULE, DELAYED RELEASE ORAL 2 TIMES DAILY
COMMUNITY

## 2022-02-09 NOTE — PROGRESS NOTES
Preoperative Screening for Elective Surgery/Invasive Procedures While COVID-19 present in the community     Have you had any of the following symptoms?none  o Fever, chills  o Cough  o Shortness of breath  o Muscle aches/pain  o Diarrhea  o Abdominal pain, nausea, vomiting  o Loss or decrease in taste and / or smell   Risk of Exposure  o Have you recently been hospitalized for COVID-19 or flu-like illness, if so when?no  o Recently diagnosed with COVID-19, if so when?no  o Recently tested for COVID-19, if so when? No will get preop covid test 2/14/22  o Have you been in close contact with a person or family member who currently has or recently had COVID-23? If yes, when and in what context?no  o Do you live with anybody who in the last 14 days has had fever, chills, shortness of breath, muscle aches, flu-like illness?no  o Do you have any close contacts or family members who are currently in the hospital for COVID-19 or flu-like illness? If yes, assess recent close contact with this person. no    Indicate if the patient has a positive screen by answering yes to one or more of the above questions. Patients who test positive or screen positive prior to surgery or on the day of surgery should be evaluated in conjunction with the surgeon/proceduralist/anesthesiologist to determine the urgency of the procedure.

## 2022-02-09 NOTE — TELEPHONE ENCOUNTER
She is having surgery 2/17/2022 and they want her to hold eliquis for a week prior. ENDOSCOPIC RETROGRADE CHOLANGIOPANCREATOGRAPHY with Dr. Ramírez Elias. They wanted to check with you regarding Eliquis.

## 2022-02-09 NOTE — PROGRESS NOTES
1. Do not eat or drink anything after 12 midnight prior to surgery. This includes no water, chewing gum mints, or ice chips. You may brush your teeth and gargle the day of surgery but DO NOT SWALLOW THE WATER. 2. Please see your family doctor/pediatrician for a history and physical and/or concerning medications. Bring any test results/reports from your physician's office. If you are under the care of a heart doctor or specialist please be aware that you may be asked to see him or her for clearance. 3. You may be asked to stop blood thinners such as Coumadin, Plavix, Fragmin, and Lovenox or Anti-inflammatories such as Aspirin, Ibuprofen, Advil, and Naproxen prior to your surgery. Please check with your doctor before stopping these or any other medications. 4. Do not smoke, and do not drink any alcoholic beverages 24 hours prior to surgery. 5. You MUST make arrangements for a responsible adult to take you home after your surgery. For your safety, you will not be allowed to leave alone or drive yourself home. Your surgery will be cancelled if you do not have a ride home. Also for your safety, it is strongly suggested someone stay with you the first 24 hrs after your surgery. 6. A parent/legal guardian must accompany a child scheduled for surgery and plan to stay at the hospital until the child is discharged. Please do not bring other children with you. 7. For your comfort,please wear simple, loose fitting clothing to the hospital.  Please do not bring valuables (money, credit cards, checkbooks, etc.) Do not wear any makeup (including no eye makeup) or nail polish on your fingers or toes. 8. For your safety, please DO NOT wear any jewelry or piercings on day of surgery. All body piercing jewelry must be removed. 9. If you have dentures, they will be removed before going to the OR; for your convenience we will provide you with a container.   If you wear contact lenses or glasses, they will be removed, they will be removed, please bring a case for them. 10. If appicable,Please see your family doctor/pediatrician for a history & physical and/or concerning medications. Bring any test results/reports from your physician's office. 11. Remember to bring Blood Bank bracelet to the hospital on the day of surgery. 12. If you have a Living Will and Durable Power of  for Healthcare, please bring in a copy. 15. Notify your Surgeon if you develop any illness between now and surgery  time, cough, cold, fever, sore throat, nausea, vomiting, etc.  Please notify your surgeon if you experience dizziness, shortness of breath or blurred vision between now & the time of your surgery   14. DO NOT shave your operative site 96 hours prior to surgery. For face & neck surgery, men may use an electric razor 48 hours prior to surgery. 15. Shower the night before surgery with _x__Antibacterial soap ___Hibiclens   16. To provide excellent care visitors will be limited to one in the room at any given time. 17.  Please bring picture ID and insurance card. 18.  Visit our web site for additional information:  [email or web address] e27/[email or web address]. Patient will only take Metoprolol dos with small sip of water.  Patient checking with Dr. Basilio Parker (cardio) when ok to hold Eliquis

## 2022-02-09 NOTE — TELEPHONE ENCOUNTER
Paroxysmal AF by history. EKG pending today pending it appears from pre-op appt. CHADS vasc 5. As her risk score is high, recommend when holding eliquis for the week we bridge with 1mg/kg lovenox BID to protect from stroke. Last dose AM prior to surgery (skip PM dose night prior). Would forward this recommendation to GI office and call in script/educate.  MANUEL COHEN

## 2022-02-10 NOTE — TELEPHONE ENCOUNTER
Pt returned call to office with more questions and clarification on how to take lovenox. Clarified dosing instructions of lovenox. Pt verbalized understanding of instructions given.

## 2022-02-14 ENCOUNTER — ANESTHESIA EVENT (OUTPATIENT)
Dept: ENDOSCOPY | Age: 82
End: 2022-02-14
Payer: MEDICARE

## 2022-02-14 ENCOUNTER — HOSPITAL ENCOUNTER (OUTPATIENT)
Age: 82
Discharge: HOME | End: 2022-02-14
Payer: MEDICARE

## 2022-02-14 PROCEDURE — U0005 INFEC AGEN DETEC AMPLI PROBE: HCPCS

## 2022-02-14 PROCEDURE — U0003 INFECTIOUS AGENT DETECTION BY NUCLEIC ACID (DNA OR RNA); SEVERE ACUTE RESPIRATORY SYNDROME CORONAVIRUS 2 (SARS-COV-2) (CORONAVIRUS DISEASE [COVID-19]), AMPLIFIED PROBE TECHNIQUE, MAKING USE OF HIGH THROUGHPUT TECHNOLOGIES AS DESCRIBED BY CMS-2020-01-R: HCPCS

## 2022-02-15 LAB — SARS-COV-2: NOT DETECTED

## 2022-02-17 ENCOUNTER — HOSPITAL ENCOUNTER (OUTPATIENT)
Age: 82
Setting detail: OUTPATIENT SURGERY
Discharge: HOME | End: 2022-02-17
Attending: INTERNAL MEDICINE | Admitting: INTERNAL MEDICINE
Payer: MEDICARE

## 2022-02-17 ENCOUNTER — ANESTHESIA (OUTPATIENT)
Dept: ENDOSCOPY | Age: 82
End: 2022-02-17
Payer: MEDICARE

## 2022-02-17 ENCOUNTER — APPOINTMENT (OUTPATIENT)
Dept: GENERAL RADIOLOGY | Age: 82
End: 2022-02-17
Attending: INTERNAL MEDICINE
Payer: MEDICARE

## 2022-02-17 VITALS
SYSTOLIC BLOOD PRESSURE: 156 MMHG | HEIGHT: 62 IN | BODY MASS INDEX: 21.9 KG/M2 | HEART RATE: 73 BPM | TEMPERATURE: 96.4 F | OXYGEN SATURATION: 99 % | RESPIRATION RATE: 16 BRPM | WEIGHT: 119 LBS | DIASTOLIC BLOOD PRESSURE: 71 MMHG

## 2022-02-17 VITALS
DIASTOLIC BLOOD PRESSURE: 63 MMHG | SYSTOLIC BLOOD PRESSURE: 139 MMHG | TEMPERATURE: 98.2 F | RESPIRATION RATE: 13 BRPM | OXYGEN SATURATION: 100 %

## 2022-02-17 DIAGNOSIS — Z01.818 PREOP TESTING: Primary | ICD-10-CM

## 2022-02-17 LAB
ANION GAP SERPL CALCULATED.3IONS-SCNC: 10 MMOL/L (ref 3–16)
BUN BLDV-MCNC: 12 MG/DL (ref 7–20)
CALCIUM SERPL-MCNC: 9.6 MG/DL (ref 8.3–10.6)
CHLORIDE BLD-SCNC: 100 MMOL/L (ref 99–110)
CO2: 24 MMOL/L (ref 21–32)
CREAT SERPL-MCNC: <0.5 MG/DL (ref 0.6–1.2)
EKG ATRIAL RATE: 66 BPM
EKG DIAGNOSIS: NORMAL
EKG P AXIS: 76 DEGREES
EKG P-R INTERVAL: 132 MS
EKG Q-T INTERVAL: 388 MS
EKG QRS DURATION: 80 MS
EKG QTC CALCULATION (BAZETT): 406 MS
EKG R AXIS: 27 DEGREES
EKG T AXIS: 64 DEGREES
EKG VENTRICULAR RATE: 66 BPM
GFR AFRICAN AMERICAN: >60
GFR NON-AFRICAN AMERICAN: >60
GLUCOSE BLD-MCNC: 137 MG/DL (ref 70–99)
GLUCOSE BLD-MCNC: 144 MG/DL (ref 70–99)
GLUCOSE BLD-MCNC: 222 MG/DL (ref 70–99)
HCT VFR BLD CALC: 34.2 % (ref 36–48)
HEMOGLOBIN: 11.6 G/DL (ref 12–16)
MCH RBC QN AUTO: 31.1 PG (ref 26–34)
MCHC RBC AUTO-ENTMCNC: 33.9 G/DL (ref 31–36)
MCV RBC AUTO: 91.8 FL (ref 80–100)
PDW BLD-RTO: 14.3 % (ref 12.4–15.4)
PERFORMED ON: ABNORMAL
PERFORMED ON: ABNORMAL
PLATELET # BLD: 202 K/UL (ref 135–450)
PMV BLD AUTO: 7.6 FL (ref 5–10.5)
POTASSIUM REFLEX MAGNESIUM: 4.3 MMOL/L (ref 3.5–5.1)
RBC # BLD: 3.73 M/UL (ref 4–5.2)
SODIUM BLD-SCNC: 134 MMOL/L (ref 136–145)
WBC # BLD: 6.5 K/UL (ref 4–11)

## 2022-02-17 PROCEDURE — 7100000011 HC PHASE II RECOVERY - ADDTL 15 MIN: Performed by: INTERNAL MEDICINE

## 2022-02-17 PROCEDURE — 7100000000 HC PACU RECOVERY - FIRST 15 MIN: Performed by: INTERNAL MEDICINE

## 2022-02-17 PROCEDURE — 3700000001 HC ADD 15 MINUTES (ANESTHESIA): Performed by: INTERNAL MEDICINE

## 2022-02-17 PROCEDURE — 80048 BASIC METABOLIC PNL TOTAL CA: CPT

## 2022-02-17 PROCEDURE — C1769 GUIDE WIRE: HCPCS | Performed by: INTERNAL MEDICINE

## 2022-02-17 PROCEDURE — 85027 COMPLETE CBC AUTOMATED: CPT

## 2022-02-17 PROCEDURE — 2580000003 HC RX 258: Performed by: ANESTHESIOLOGY

## 2022-02-17 PROCEDURE — 93005 ELECTROCARDIOGRAM TRACING: CPT | Performed by: ANESTHESIOLOGY

## 2022-02-17 PROCEDURE — 7100000010 HC PHASE II RECOVERY - FIRST 15 MIN: Performed by: INTERNAL MEDICINE

## 2022-02-17 PROCEDURE — 93010 ELECTROCARDIOGRAM REPORT: CPT | Performed by: INTERNAL MEDICINE

## 2022-02-17 PROCEDURE — 6360000002 HC RX W HCPCS: Performed by: NURSE ANESTHETIST, CERTIFIED REGISTERED

## 2022-02-17 PROCEDURE — 2709999900 HC NON-CHARGEABLE SUPPLY: Performed by: INTERNAL MEDICINE

## 2022-02-17 PROCEDURE — 74330 X-RAY BILE/PANC ENDOSCOPY: CPT

## 2022-02-17 PROCEDURE — 3609018800 HC ERCP DX COLLECTION SPECIMEN BRUSHING/WASHING: Performed by: INTERNAL MEDICINE

## 2022-02-17 PROCEDURE — 7100000001 HC PACU RECOVERY - ADDTL 15 MIN: Performed by: INTERNAL MEDICINE

## 2022-02-17 PROCEDURE — 3700000000 HC ANESTHESIA ATTENDED CARE: Performed by: INTERNAL MEDICINE

## 2022-02-17 PROCEDURE — 36415 COLL VENOUS BLD VENIPUNCTURE: CPT

## 2022-02-17 PROCEDURE — 2500000003 HC RX 250 WO HCPCS: Performed by: NURSE ANESTHETIST, CERTIFIED REGISTERED

## 2022-02-17 RX ORDER — SODIUM CHLORIDE 0.9 % (FLUSH) 0.9 %
10 SYRINGE (ML) INJECTION EVERY 12 HOURS SCHEDULED
Status: DISCONTINUED | OUTPATIENT
Start: 2022-02-17 | End: 2022-02-17 | Stop reason: HOSPADM

## 2022-02-17 RX ORDER — SODIUM CHLORIDE 9 MG/ML
25 INJECTION, SOLUTION INTRAVENOUS PRN
Status: DISCONTINUED | OUTPATIENT
Start: 2022-02-17 | End: 2022-02-17 | Stop reason: HOSPADM

## 2022-02-17 RX ORDER — DEXAMETHASONE SODIUM PHOSPHATE 4 MG/ML
INJECTION, SOLUTION INTRA-ARTICULAR; INTRALESIONAL; INTRAMUSCULAR; INTRAVENOUS; SOFT TISSUE PRN
Status: DISCONTINUED | OUTPATIENT
Start: 2022-02-17 | End: 2022-02-17 | Stop reason: SDUPTHER

## 2022-02-17 RX ORDER — PROPOFOL 10 MG/ML
INJECTION, EMULSION INTRAVENOUS PRN
Status: DISCONTINUED | OUTPATIENT
Start: 2022-02-17 | End: 2022-02-17 | Stop reason: SDUPTHER

## 2022-02-17 RX ORDER — SODIUM CHLORIDE 0.9 % (FLUSH) 0.9 %
5-40 SYRINGE (ML) INJECTION EVERY 12 HOURS SCHEDULED
Status: DISCONTINUED | OUTPATIENT
Start: 2022-02-17 | End: 2022-02-17 | Stop reason: HOSPADM

## 2022-02-17 RX ORDER — DIPHENHYDRAMINE HYDROCHLORIDE 50 MG/ML
6.25 INJECTION INTRAMUSCULAR; INTRAVENOUS
Status: DISCONTINUED | OUTPATIENT
Start: 2022-02-17 | End: 2022-02-17 | Stop reason: HOSPADM

## 2022-02-17 RX ORDER — SODIUM CHLORIDE, SODIUM LACTATE, POTASSIUM CHLORIDE, CALCIUM CHLORIDE 600; 310; 30; 20 MG/100ML; MG/100ML; MG/100ML; MG/100ML
INJECTION, SOLUTION INTRAVENOUS CONTINUOUS
Status: DISCONTINUED | OUTPATIENT
Start: 2022-02-17 | End: 2022-02-17 | Stop reason: HOSPADM

## 2022-02-17 RX ORDER — FENTANYL CITRATE 50 UG/ML
INJECTION, SOLUTION INTRAMUSCULAR; INTRAVENOUS PRN
Status: DISCONTINUED | OUTPATIENT
Start: 2022-02-17 | End: 2022-02-17 | Stop reason: SDUPTHER

## 2022-02-17 RX ORDER — LIDOCAINE HYDROCHLORIDE 20 MG/ML
INJECTION, SOLUTION INFILTRATION; PERINEURAL PRN
Status: DISCONTINUED | OUTPATIENT
Start: 2022-02-17 | End: 2022-02-17 | Stop reason: SDUPTHER

## 2022-02-17 RX ORDER — SODIUM CHLORIDE 0.9 % (FLUSH) 0.9 %
10 SYRINGE (ML) INJECTION PRN
Status: DISCONTINUED | OUTPATIENT
Start: 2022-02-17 | End: 2022-02-17 | Stop reason: HOSPADM

## 2022-02-17 RX ORDER — ONDANSETRON 2 MG/ML
INJECTION INTRAMUSCULAR; INTRAVENOUS PRN
Status: DISCONTINUED | OUTPATIENT
Start: 2022-02-17 | End: 2022-02-17 | Stop reason: SDUPTHER

## 2022-02-17 RX ORDER — ROCURONIUM BROMIDE 10 MG/ML
INJECTION, SOLUTION INTRAVENOUS PRN
Status: DISCONTINUED | OUTPATIENT
Start: 2022-02-17 | End: 2022-02-17 | Stop reason: SDUPTHER

## 2022-02-17 RX ORDER — LIDOCAINE HYDROCHLORIDE 10 MG/ML
1 INJECTION, SOLUTION EPIDURAL; INFILTRATION; INTRACAUDAL; PERINEURAL
Status: DISCONTINUED | OUTPATIENT
Start: 2022-02-17 | End: 2022-02-17 | Stop reason: HOSPADM

## 2022-02-17 RX ORDER — SODIUM CHLORIDE 0.9 % (FLUSH) 0.9 %
5-40 SYRINGE (ML) INJECTION PRN
Status: DISCONTINUED | OUTPATIENT
Start: 2022-02-17 | End: 2022-02-17 | Stop reason: HOSPADM

## 2022-02-17 RX ORDER — ONDANSETRON 2 MG/ML
4 INJECTION INTRAMUSCULAR; INTRAVENOUS EVERY 30 MIN PRN
Status: DISCONTINUED | OUTPATIENT
Start: 2022-02-17 | End: 2022-02-17 | Stop reason: HOSPADM

## 2022-02-17 RX ADMIN — DEXAMETHASONE SODIUM PHOSPHATE 8 MG: 4 INJECTION, SOLUTION INTRAMUSCULAR; INTRAVENOUS at 11:05

## 2022-02-17 RX ADMIN — PROPOFOL 150 MG: 10 INJECTION, EMULSION INTRAVENOUS at 10:56

## 2022-02-17 RX ADMIN — Medication 50 MCG: at 11:42

## 2022-02-17 RX ADMIN — ONDANSETRON 4 MG: 2 INJECTION INTRAMUSCULAR; INTRAVENOUS at 11:05

## 2022-02-17 RX ADMIN — SODIUM CHLORIDE, SODIUM LACTATE, POTASSIUM CHLORIDE, AND CALCIUM CHLORIDE: .6; .31; .03; .02 INJECTION, SOLUTION INTRAVENOUS at 10:51

## 2022-02-17 RX ADMIN — SUGAMMADEX 200 MG: 100 INJECTION, SOLUTION INTRAVENOUS at 11:46

## 2022-02-17 RX ADMIN — GLUCAGON HYDROCHLORIDE 0.5 MG: KIT at 11:18

## 2022-02-17 RX ADMIN — Medication 50 MCG: at 11:05

## 2022-02-17 RX ADMIN — LIDOCAINE HYDROCHLORIDE 60 MG: 20 INJECTION, SOLUTION INFILTRATION; PERINEURAL at 10:56

## 2022-02-17 RX ADMIN — ROCURONIUM BROMIDE 50 MG: 10 SOLUTION INTRAVENOUS at 10:56

## 2022-02-17 ASSESSMENT — PULMONARY FUNCTION TESTS
PIF_VALUE: 16
PIF_VALUE: 1
PIF_VALUE: 1
PIF_VALUE: 15
PIF_VALUE: 17
PIF_VALUE: 16
PIF_VALUE: 13
PIF_VALUE: 16
PIF_VALUE: 17
PIF_VALUE: 16
PIF_VALUE: 16
PIF_VALUE: 15
PIF_VALUE: 1
PIF_VALUE: 17
PIF_VALUE: 15
PIF_VALUE: 16
PIF_VALUE: 7
PIF_VALUE: 21
PIF_VALUE: 12
PIF_VALUE: 17
PIF_VALUE: 16
PIF_VALUE: 17
PIF_VALUE: 13
PIF_VALUE: 17
PIF_VALUE: 15
PIF_VALUE: 13
PIF_VALUE: 17
PIF_VALUE: 16
PIF_VALUE: 17
PIF_VALUE: 21
PIF_VALUE: 16
PIF_VALUE: 15
PIF_VALUE: 16
PIF_VALUE: 15
PIF_VALUE: 17
PIF_VALUE: 16
PIF_VALUE: 17
PIF_VALUE: 16
PIF_VALUE: 16
PIF_VALUE: 17
PIF_VALUE: 12
PIF_VALUE: 16
PIF_VALUE: 17
PIF_VALUE: 1
PIF_VALUE: 21
PIF_VALUE: 16
PIF_VALUE: 16
PIF_VALUE: 9
PIF_VALUE: 16
PIF_VALUE: 15
PIF_VALUE: 15
PIF_VALUE: 16
PIF_VALUE: 16
PIF_VALUE: 13
PIF_VALUE: 16
PIF_VALUE: 16
PIF_VALUE: 1
PIF_VALUE: 17

## 2022-02-17 ASSESSMENT — PAIN SCALES - GENERAL: PAINLEVEL_OUTOF10: 0

## 2022-02-17 NOTE — ANESTHESIA PRE PROCEDURE
Department of Anesthesiology  Preprocedure Note       Name:  Eyal Cardenas   Age:  [de-identified] y.o.  :  1940                                          MRN:  [id number]         Date:  2022      Surgeon: Aguila Garay):  Sarahi Chamberlain MD    Procedure: Procedure(s):  ENDOSCOPIC RETROGRADE CHOLANGIOPANCREATOGRAPHY    Medications prior to admission:   Prior to Admission medications    Medication Sig Start Date End Date Taking? Authorizing Provider   omeprazole (PRILOSEC) 40 MG delayed release capsule Take 40 mg by mouth 2 times daily   Yes Historical Provider, MD   enoxaparin (LOVENOX) 120 MG/0.8ML injection Inject 0.36 mLs into the skin 2 times daily for 7 days Inject 0.36 ml 2 times on Thursday, 2 times on Friday, 2 times on Saturday, 2 times on , 2 times on Monday, 2 times on Tuesday and 1 time Wednesday. DO NOT take PM dose on Wednesday.  2/10/22 2/17/22  Brent Restrepo MD   apixaban Ariana Begun) 5 MG TABS tablet Take 1 tablet by mouth 2 times daily 21   Brent Restrepo MD   metoprolol tartrate (LOPRESSOR) 25 MG tablet Take 1 tablet by mouth 2 times daily 21   Brent Restrepo MD   acyclovir (ZOVIRAX) 800 MG tablet Take 400 mg by mouth 2 times daily     Historical Provider, MD   Coenzyme Q10 (COQ10) 100 MG CAPS Take 1 capsule by mouth daily    Historical Provider, MD   Biotin 10 MG tablet Take 10 mg by mouth daily    Historical Provider, MD   nystatin (MYCOSTATIN) 037197 UNIT/ML suspension Take 5 mLs by mouth 4 times daily Swish and spit 21   Manolo Amaro MD   calcium carbonate (OSCAL) 500 MG TABS tablet Take 500 mg by mouth daily    Historical Provider, MD   vitamin D (CHOLECALCIFEROL) 1000 UNIT TABS tablet Take 1,000 Units by mouth every other day     Historical Provider, MD   Probiotic Product (PROBIOTIC ADVANCED PO) Take 1 tablet by mouth daily     Historical Provider, MD   oxybutynin (DITROPAN) 5 MG tablet TAKE 1 TABLET BY MOUTH 2 TIMES DAILY 17   Thania Cedar, APRN - CNP   Omega-3 Fatty Acids (DILAUDID) injection 0.5 mg  0.5 mg IntraVENous Q5 Min PRN Lurdes Manzo MD        ondansetron [facility]) injection 4 mg  4 mg IntraVENous Q30 Min PRN Lurdes Manzo MD        diphenhydrAMINE (BENADRYL) injection 6.25 mg  6.25 mg IntraVENous Once PRN Lurdes Manzo MD           Allergies: Allergies   Allergen Reactions    Sulfa Antibiotics Anaphylaxis    Dicyclomine Hcl Hives, Itching and Rash    Diclofenac      Other reaction(s): swelling/H&P    Lisinopril Other (See Comments)     Leg cramping     Penicillins      blisters    Sulfamethoxazole-Trimethoprim [Bactrim] Other (See Comments)     WHITE SPOTS IN MOUTH       Problem List:    Patient Active Problem List   Diagnosis Code    Diabetes mellitus without complication ([facility]) I63.3    Hypertension I10    Hyperlipidemia E78.5    Post-menopausal bleeding N95.0    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, grade 1, uterine cancer with myometrial invasion 2 mm out of 19 mm with no cervical involvement, no lymphvascular space invasion, and all pelvic lymph nodes are negative    GERD (gastroesophageal reflux disease)     Hyperlipidemia     Hypertension     Nausea     PAF (paroxysmal atrial fibrillation) ([facility])     Pancreas divisum     Right carpal tunnel syndrome 09/07/2016    Type II or unspecified type diabetes mellitus without mention of complication, not stated as uncontrolled        Past Surgical History:        Procedure Laterality Date    BLADDER SURGERY      tuck    BREAST BIOPSY      CARPAL TUNNEL RELEASE      LEFT HAND    CARPAL TUNNEL RELEASE Right     CHOLECYSTECTOMY      HYSTERECTOMY      CAROLINE AND BSO  2011    he patient underwent her staging procedure consisting of a hysterectomy, bilateral salpingo-oophorectomy, and bilateral pelvic lymph node dissection in March of 2011 under the direction of Dr. Corey Mariscal. The patient followed with Dr. Corey Mariscal until he moved to another location outside of the city. The patient has had no evidence of disease to date of 7-       Social History:    Social History     Tobacco Use    Smoking status: Never Smoker    Smokeless tobacco: Never Used   Substance Use Topics    Alcohol use: No                                Counseling given: Not Answered      Vital Signs (Current):   Vitals:    02/09/22 1303 02/17/22 0930   BP:  (!) 157/83   Pulse:  71   Resp:  16   Temp:  97.6 °F (36.4 °C)   TempSrc:  Temporal   SpO2:  100%   Weight: 119 lb (54 kg)    Height: 5' 2\" (1.575 m)                                               BP Readings from Last 3 Encounters:   02/17/22 (!) 157/83   09/22/21 (!) 140/62   08/18/21 (!) 149/67       NPO Status:                                                                                 BMI:   Wt Readings from Last 3 Encounters:   02/09/22 119 lb (54 kg)   09/22/21 125 lb 1.9 oz (56.8 kg)   08/18/21 130 lb 4.7 oz (59.1 kg)     Body mass index is 21.77 kg/m².     CBC:   Lab Results   Component Value Date    WBC 3.2 08/18/2021    RBC 3.41 08/18/2021    HGB 10.8 08/18/2021    HCT 32.4 08/18/2021    MCV 95.0 08/18/2021    RDW 13.3 08/18/2021     08/18/2021       CMP:   Lab Results   Component Value Date     08/18/2021    K 3.7 08/18/2021    K 3.2 08/17/2021     08/18/2021    CO2 19 08/18/2021    BUN 9 08/18/2021    CREATININE <0.5 08/18/2021    GFRAA >60 08/18/2021    GFRAA >60 05/30/2013    AGRATIO 1.6 08/16/2021    LABGLOM >60 08/18/2021    GLUCOSE 100 08/18/2021    PROT 6.0 08/16/2021    PROT 7.0 02/15/2012    CALCIUM 8.1 08/18/2021    BILITOT 0.4 08/16/2021    ALKPHOS 69 08/16/2021    AST 22 08/16/2021    ALT 16 08/16/2021       POC Tests: No results for input(s): POCGLU, POCNA, POCK, POCCL, POCBUN, POCHEMO, POCHCT in the last 72 hours. Coags:   Lab Results   Component Value Date    PROTIME 11.0 09/04/2014    INR 1.02 09/04/2014       HCG (If Applicable): No results found for: PREGTESTUR, PREGSERUM, HCG, HCGQUANT     ABGs: No results found for: PHART, PO2ART, QWY6HCK, LIZ7BEL, BEART, B5PXYQFX     Type & Screen (If Applicable):  No results found for: LABABO, LABRH    Drug/Infectious Status (If Applicable):  No results found for: HIV, HEPCAB    COVID-19 Screening (If Applicable):   Lab Results   Component Value Date    COVID19 Not Detected 02/14/2022           Anesthesia Evaluation  Patient summary reviewed and Nursing notes reviewed no history of anesthetic complications:   Airway: Mallampati: II     Neck ROM: full   Dental:          Pulmonary:                              Cardiovascular:    (+) hypertension:,                   Neuro/Psych:   (+) neuromuscular disease:,             GI/Hepatic/Renal:   (+) GERD:,           Endo/Other:    (+) Diabetes, . Abdominal:             Vascular: Other Findings:             Anesthesia Plan      general     ASA 3       Induction: intravenous. Anesthetic plan and risks discussed with patient. Plan discussed with CRNA.                   Kimberly Taylor MD   2/17/2022

## 2022-02-17 NOTE — H&P
Pre-sedation Assessment    History and Physical / Pre-Sedation Assessment  Patient:  Chelita Pavon   :   1940     Intended Procedure: ERCP      HPI: [de-identified] yo w abd pain and CT/MRI revealing a dilated bile duct and pancreatic duct w possible stricture in distal to mid CBD    Current Facility-Administered Medications   Medication Dose Route Frequency Provider Last Rate Last Admin    lactated ringers infusion   IntraVENous Continuous Rishabh Valladares MD        sodium chloride flush 0.9 % injection 10 mL  10 mL IntraVENous 2 times per day Rishabh Valladares MD        sodium chloride flush 0.9 % injection 10 mL  10 mL IntraVENous PRN Rishabh Valladares MD        0.9 % sodium chloride infusion  25 mL IntraVENous PRN Rishabh Valladares MD        lidocaine PF 1 % injection 1 mL  1 mL IntraDERmal Once PRN Rishabh Valladares MD        sodium chloride flush 0.9 % injection 5-40 mL  5-40 mL IntraVENous 2 times per day Yessica Ordoñez MD        sodium chloride flush 0.9 % injection 5-40 mL  5-40 mL IntraVENous PRN Yessica Ordoñez MD        0.9 % sodium chloride infusion  25 mL IntraVENous PRN Yessica Ordoñez MD        meperidine (DEMEROL) injection SOLN 12.5 mg  12.5 mg IntraVENous Q5 Min PRN Yessica Ordoñez MD        HYDROmorphone (DILAUDID) injection 0.5 mg  0.5 mg IntraVENous Q10 Min PRN Yessica Ordoñez MD        HYDROmorphone (DILAUDID) injection 0.5 mg  0.5 mg IntraVENous Q5 Min PRN Yessica Ordoñez MD        ondansetron [facility]) injection 4 mg  4 mg IntraVENous Q30 Min PRN Yessica Ordoñez MD        diphenhydrAMINE (BENADRYL) injection 6.25 mg  6.25 mg IntraVENous Once PRN Yessica Ordoñez MD         No current facility-administered medications on file prior to encounter.      Current Outpatient Medications on File Prior to Encounter   Medication Sig Dispense Refill    omeprazole (PRILOSEC) 40 MG delayed release capsule Take 40 mg by mouth 2 times daily      metoprolol tartrate (LOPRESSOR) 25 MG tablet Take 1 tablet by mouth 2 times daily 180 tablet 3    acyclovir (ZOVIRAX) 800 MG tablet Take 400 mg by mouth 2 times daily       Biotin 10 MG tablet Take 10 mg by mouth daily      nystatin (MYCOSTATIN) 733067 UNIT/ML suspension Take 5 mLs by mouth 4 times daily Swish and spit 600 mL 1    oxybutynin (DITROPAN) 5 MG tablet TAKE 1 TABLET BY MOUTH 2 TIMES DAILY 180 tablet 3    Omega-3 Fatty Acids (OMEGA-3 FISH OIL) 1000 MG CAPS Take 1 capsule by mouth      metFORMIN (GLUCOPHAGE) 500 MG tablet Take 1 tablet by mouth 2 times daily (with meals) 180 tablet 1    atorvastatin (LIPITOR) 10 MG tablet Take 1 tablet by mouth daily 90 tablet 1    apixaban (ELIQUIS) 5 MG TABS tablet Take 1 tablet by mouth 2 times daily 180 tablet 3    Coenzyme Q10 (COQ10) 100 MG CAPS Take 1 capsule by mouth daily      calcium carbonate (OSCAL) 500 MG TABS tablet Take 500 mg by mouth daily      vitamin D (CHOLECALCIFEROL) 1000 UNIT TABS tablet Take 1,000 Units by mouth every other day       Probiotic Product (PROBIOTIC ADVANCED PO) Take 1 tablet by mouth daily       Misc Natural Products (OSTEO BI-FLEX ADV DOUBLE ST PO) Take 1 tablet by mouth daily       glucose blood VI test strips (ONE TOUCH ULTRA TEST) strip Check glucose daily.  Diagnosis code 250.00. 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, grade 1, uterine cancer with myometrial invasion 2 mm out of 19 mm with no cervical involvement, no lymphvascular space invasion, and all pelvic lymph nodes are negative    GERD (gastroesophageal reflux disease)     Hyperlipidemia     Hypertension     Nausea     PAF (paroxysmal atrial fibrillation) ([facility])     Pancreas divisum     Right carpal tunnel syndrome 09/07/2016    Type II or unspecified type diabetes mellitus without mention of complication, not stated as uncontrolled      Past Surgical History:   Procedure Laterality Date    BLADDER SURGERY      tuck    BREAST BIOPSY      CARPAL TUNNEL RELEASE      LEFT HAND    CARPAL TUNNEL RELEASE Right     CHOLECYSTECTOMY      HYSTERECTOMY      CAROLINE AND BSO  2011    he patient underwent her staging procedure consisting of a hysterectomy, bilateral salpingo-oophorectomy, and bilateral pelvic lymph node dissection in March of 2011 under the direction of Dr. Marlon Ulloa. The patient followed with Dr. Marlon Ulloa until he moved to another location outside of the city. The patient has had no evidence of disease to date of 7-       Nurses notes reviewed and agreed. Medications reviewed  Allergies: Allergies   Allergen Reactions    Sulfa Antibiotics Anaphylaxis    Dicyclomine Hcl Hives, Itching and Rash    Diclofenac      Other reaction(s): swelling/H&P    Lisinopril Other (See Comments)     Leg cramping     Penicillins      blisters    Sulfamethoxazole-Trimethoprim [Bactrim] Other (See Comments)     WHITE SPOTS IN MOUTH           Physical Exam:  Vital Signs: BP (!) 157/83   Pulse 71   Temp 97.6 °F (36.4 °C) (Temporal)   Resp 16   Ht 5' 2\" (1.575 m)   Wt 119 lb (54 kg)   LMP 06/12/1991 (Approximate)   SpO2 100%   BMI 21.77 kg/m²  Body mass index is 21.77 kg/m².   Airway:Normal  Cardiac:Normal  Pulmonary:Normal  Abdomen:Normal  Specific to procedure: none      Pre-Procedure Assessment/Plan:  ASA 3 - Patient with moderate systemic disease with functional limitations  Mallampati II  Level of Sedation Plan:GA    Post Procedure plan: Return to same level of care    I assessed the patient and find that the patient is in satisfactory condition to proceed with the planned procedure and sedation plan. I have explained the risk, benefits, and alternatives to the procedure. The patient understands and agrees to proceed.   Yes    Sarahi Chamberlain MD       (O) [phone]          Sarahi Chamberlain MD  10:10 AM 2/17/2022

## 2022-02-17 NOTE — OP NOTE
Endoscopic Retrograde Cholangio Pancreato Gram Note    Patient:   Kasey Watson    YOB: 1940    Facility:   [facility] [Outpatient]   Referring/PCP: Severo Garvin MD    Procedure:   Endoscopic Retrograde Cholangio Pancreato Gram Gi ercp types: --diagnostic  Date:     2/17/2022   Endoscopist:  Yisel Ha MD     Preoperative Diagnosis: Dyspepsia w dilated CBD and panc duct  Postoperative Diagnosis:  Failed ERCP    Anesthesia: By anesthesia personnel  Estimated blood loss: Estimated amount of blood loss is <1ml. Complications: None    Description of Procedure:  Informed consent was obtained from the patient and the patient's  after explanation of the procedure including indications, description of the procedure,  benefits and possible risks and complications of the procedure, and alternatives. Questions were answered. The patient's history was reviewed and a directed physical examination was performed prior to the procedure. Patient was monitored throughout the procedure with pulse oximetry and periodic assessment of vital signs. Patient was sedated as noted above. The Nursing staff and I performed a time out. With the patient in the prone position, the Olympus videoduodenoscope was placed in the patient's mouth and passed into the esophagus. The scope was ultimately passed to the third portion of the duodenum. Visualization was performed during both introduction and withdrawal of the endoscope and retroflexed view of the proximal stomach was obtained. Findings[de-identified]   Esophagus, Stomach, Duodenum: Limited views    The ampulla was identified and was swollen. Cannulation of the Bilary system was not achieved due to inability to pass a wire even after an empiric sphincterotomy. Cannulation of the pancreatic duct was not performed. Indocin was not given due to allergy to other NSAID (Diclofenac)    Recommendations:  Will schedule an MRCP and consider EUS      Tyler Garcias MD       (O) [phone]          Tyler Garcias MD, MD

## 2022-02-18 ENCOUNTER — TELEPHONE (OUTPATIENT)
Dept: CARDIOLOGY CLINIC | Age: 82
End: 2022-02-18

## 2022-02-18 NOTE — TELEPHONE ENCOUNTER
Pt stated that she would like to know if there is anything with the dosing of her medications that she can change? Pt had an endoscope procedure on 02/17/2022. Pt stated that during that procedure it was difficult for them to find a vain. Pt does not know if this is related to the Eliquis? Please advise. Pt last seen 09/22/2021 conrad.

## 2022-02-18 NOTE — ANESTHESIA POSTPROCEDURE EVALUATION
Department of Anesthesiology  Postprocedure Note    Patient: Susan Calvo  MRN: [id number]  YOB: 1940  Date of evaluation: 2/17/2022  Time:  7:43 PM     Procedure Summary     Date: 02/17/22 Room / Location: [address]    Anesthesia Start: 1318 Anesthesia Stop: 4355    Procedure: ENDOSCOPIC RETROGRADE CHOLANGIOPANCREATOGRAPHY (N/A ) Diagnosis:       Dilation of pancreatic duct      Pancreas divisum      (DILATED PANCREATIC DUCT; PANCREATIC DIVISUM)    Surgeons: Luann Franco MD Responsible Provider: Lurdes Manzo MD    Anesthesia Type: general ASA Status: 3          Anesthesia Type: general    Courtney Phase I: Courtney Score: 9    Courtney Phase II: Courtney Score: 10    Last vitals: Reviewed and per EMR flowsheets.        Anesthesia Post Evaluation    Comments: Postoperative Anesthesia Note    Name:    Susan Calvo  MRN:      [id number]    Patient Vitals in the past 12 hrs:  02/17/22 1300, BP:(!) 156/71, Pulse:73, Resp:16, SpO2:99 %  02/17/22 1255, BP:(!) 156/71, Temp:96.4 °F (35.8 °C), Temp src:Temporal, Pulse:74, Resp:14, SpO2:97 %  02/17/22 1253, BP:(!) 148/63, Pulse:67, Resp:14, SpO2:95 %  02/17/22 1250, BP:(!) 154/67, Pulse:66, Resp:11, SpO2:100 %  02/17/22 1245, BP:(!) 140/64, Pulse:68, Resp:13, SpO2:99 %  02/17/22 1235, BP:(!) 145/91, Pulse:69, Resp:10, SpO2:97 %  02/17/22 1230, BP:(!) 148/60, Pulse:68, Resp:16, SpO2:99 %  02/17/22 1225, BP:(!) 158/59, Pulse:68, Resp:10, SpO2:98 %  02/17/22 1220, BP:(!) 158/65, Pulse:68, Resp:13, SpO2:99 %  02/17/22 1215, BP:(!) 141/91, Pulse:69, Resp:17, SpO2:100 %  02/17/22 1210, BP:(!) 153/68, Pulse:70, Resp:14, SpO2:100 %  02/17/22 1208, Temp:96.6 °F (35.9 °C), Temp src:Temporal  02/17/22 1205, BP:(!) 137/91, Pulse:70, Resp:12, SpO2:99 %  02/17/22 1200, Pulse:74  02/17/22 0930, BP:(!) 157/83, Temp:97.6 °F (36.4 °C), Temp src:Temporal, Pulse:71, Resp:16, SpO2:100 %     LABS:    CBC  Lab Results       Component

## 2022-02-18 NOTE — TELEPHONE ENCOUNTER
Called and spoke with pt and let her know her eliquis is likely not the reason they had a hard time finding her vein.  Advised pt to stay hydrated and do not stop any medications at this time, pt v/u

## 2022-02-24 ENCOUNTER — HOSPITAL ENCOUNTER (OUTPATIENT)
Dept: MRI IMAGING | Age: 82
Discharge: HOME | End: 2022-02-24
Payer: MEDICARE

## 2022-02-24 DIAGNOSIS — R93.5 ABNORMAL CT OF THE ABDOMEN: ICD-10-CM

## 2022-02-24 PROCEDURE — 6360000004 HC RX CONTRAST MEDICATION: Performed by: INTERNAL MEDICINE

## 2022-02-24 PROCEDURE — 74183 MRI ABD W/O CNTR FLWD CNTR: CPT

## 2022-02-24 PROCEDURE — A9579 GAD-BASE MR CONTRAST NOS,1ML: HCPCS | Performed by: INTERNAL MEDICINE

## 2022-02-24 RX ADMIN — GADOTERIDOL 10 ML: 279.3 INJECTION, SOLUTION INTRAVENOUS at 14:41

## 2022-03-01 ENCOUNTER — TELEPHONE (OUTPATIENT)
Dept: CARDIOLOGY CLINIC | Age: 82
End: 2022-03-01

## 2022-03-01 NOTE — TELEPHONE ENCOUNTER
Brief hold is fine. She has been in sinus at appt's and <48 hr hold is low risk. Restart as soon as able post-procedure.  MANUEL COHEN

## 2022-03-01 NOTE — TELEPHONE ENCOUNTER
Spoke with patient and she is having a GI scope on 3/9/22. Her doctor wanted her to check with you to see if she can hold her Eliquis on the 8th and then can restart as soon as he is finished on the 9th. Is this ok with you?

## 2022-03-03 NOTE — PROGRESS NOTES
[facility]in Preoperative Screening for Elective Surgery/Invasive Procedures While COVID-19 present in the community     Have you had any of the following symptoms? o Fever, chills  o Cough  o Shortness of breath  o Muscle aches/pain  o Diarrhea  o Abdominal pain, nausea, vomiting  o Loss or decrease in taste and / or smell   Risk of Exposure  o Have you recently been hospitalized for COVID-19 or flu-like illness, if so when?  o Recently diagnosed with COVID-19, if so when?  o Recently tested for COVID-19, if so when?  o Have you been in close contact with a person or family member who currently has or recently had COVID-19? If yes, when and in what context?  o Do you live with anybody who in the last 14 days has had fever, chills, shortness of breath, muscle aches, flu-like illness?  o Do you have any close contacts or family members who are currently in the hospital for COVID-19 or flu-like illness? If yes, assess recent close contact with this person. Indicate if the patient has a positive screen by answering yes to one or more of the above questions. Patients who test positive or screen positive prior to surgery or on the day of surgery should be evaluated in conjunction with the surgeon/proceduralist/anesthesiologist to determine the urgency of the procedure.

## 2022-03-03 NOTE — PROGRESS NOTES
.1.  Do not eat or drink anything after 12 midnight prior to surgery. This includes no water, chewing gum or mints. 2.  Take the following pills with a small sip of water on the morning of surgery 03/09/2022.  3.  Aspirin, Ibuprofen, Advil, Naproxen, Vitamin E and other Anti-inflammatory products should be stopped for 5 days before surgery or as directed by your physician. 4.  Check with your doctor regarding stopping Plavix, Coumadin, Lovenox, Fragmin or other blood thinners. 5.  Do not smoke and do not drink alcoholic beverages 24 hours prior to surgery. This includes NA Beer. 6.  You may brush your teeth and gargle the morning of surgery. DO NOT SWALLOW WATER.  7.  You MUST make arrangements for a responsible adult to take you home after your surgery. You will not be allowed to leave alone or drive yourself home. It is strongly suggested someone stay with you the first 24 hours. Your surgery will be cancelled if you do not have a ride home. 8.  A parent/legal guardian must accompany a child scheduled for surgery and plan to stay at the hospital until the child is discharged. Please do not bring other children with you. 9.  Please wear simple, loose fitting clothing to the hospital.  Louise Matters not bring valuables ( money, credit cards, checkbooks, etc.)  Do not wear any makeup (including no eye makeup) or nail polish on your fingers or toes. 10.  Do not wear any jewelry or piercing on the day of surgery. All body piercing jewelry must be removed. 11.  If you have dentures, they will be removed before going to the OR; we will provide you a container. If you wear contact lenses or glasses, they will be removed; please bring a case for them. 12.  Please see your family doctor/pediatrician for a history & physical and/or concerning medications. Bring any test results/reports from your physician's office the day of surgery.     13.  Remember to bring Blood Bank Bracelet to the hospital on the day of [facility]  Certificate of Child Health Examination  Student's Name:   Floyd Armando Birth Date  2009  Sex  male  Race/Ethnicity   School/Grade Level/ID#     Address:   [address] [place] [address]  Parent/Guardian             Telephone#                                            Home:                               Work:   IMMUNIZATIONS: To be completed by health care provider. The mo/da/yr for every dose administered is required. If a specific vaccine is medically contraindicated, a separate written statement must be attached by the health care responsible for completing the health examination explaining the medical reason for the contraindication.      Immunization History   Administered Date(s) Administered   • DTaP/HIB/IPV 2009, 2011, 02/27/2012   • Dtap, Unspecified Formulation 04/10/2013, 08/20/2015   • Hep A, ped/adol, 2 dose 10/04/2012, 04/10/2013   • Hep B, adolescent or pediatric 2011, 05/24/2012   • Hib (PRP-T) 01/09/2013   • Influenza, Unspecified Formulation 02/27/2012, 03/31/2012, 10/04/2012, 08/19/2013, 08/20/2015, 08/26/2017   • Influenza, injectable, MDCK, preservative free, quadrivalent 11/29/2018   • Influenza, injectable, quadrivalent, preservative-free 11/30/2016   • MMR 05/24/2012, 10/04/2012   • Measles Mumps Rubella Varicella 08/20/2015   • Pneumococcal Conjugate 13 valent 2011, 2011, 02/27/2012, 01/09/2013   • Polio, Unspecified Formulation 08/20/2015   • Rotavirus - pentavalent 2011, 2011, 02/27/2012   • Varicella 10/04/2012      Immunizations given 9/10/2019: {-:727385::\"None\"}        Health care provider (DO [place]INT[place], APN, PA, school health professional, health official) verifying above immunization history must sign below. If adding dates to the above immunization history section, put your initials by date(s) and sign here.)  Signature                                                                 Title                                                 Date  _____________________________________________________________________________________  Signature                                                                 Title                                                Date  _____________________________________________________________________________________   ALTERNATIVE PROOF OF IMMUNITY   1. Clinical diagnosis (measles, mumps, hepatitis B) is allowed when verified by physician and supported with lab confirmation.  Attach copy of lab result.    * MEASLES (Rubeola)  MO DA YR          **MUMPS  MO DA YR               HEPATITIS B  MO DA YR           VARICELLA  MO DA YR      2. History of varicella (chickenpox) disease is acceptable if verified by health care provider, school health professional or health official.  Person signing below verifies that the parent/guardian’s description of varicella disease history is indicative of past infection and is accepting such history as documentation of disease.  Date of Disease                                  Signature                                                           Title   3. Laboratory Evidence of Immunity (check one) __ Measles*      __ Mumps**     __ Rubella     __Varicella Attach copy of lab result.  Lab Results                                      Date           MO DA YR                            (Attach copy of lab result)         *All measles cases diagnosed on or after July 1, 2002, must be confirmed by laboratory evidence.      **All mumps cases diagnosed on or after July 1, 2013, must be confirmed by laboratory evidence.     Completion of Alternative 1 or 3 MUST be accompanied by Labs & Physician Signature: ___________________________  Physician Statements of Immunity MUST be submitted to [facility] for review.     Certificates of Taoism Exemption to Immunizations or Physician Medical Statements of Medical Contraindication Are Reviewed   and Maintained by the  surgery. 14.  If you have a Living Will and Durable Power of  for Healthcare, please bring in a copy. 13.  Notify your Surgeon if you develop any illness between now and surgery time; cough, cold, fever, sore throat, nausea, vomiting, etc.  Please notify your surgeon if you experience dizziness, shortness of breath or blurred vision between now and the time of your surgery. 16.  DO NOT shave your operative site 96 hours (4 days) prior to surgery. For face and neck surgery, men may use an electric razor 48 hours (2 days) prior to surgery. 17. Shower the night before surgery and the morning of surgery with  an antibacterial soap   or  Chlorhexidine gluconate (for total joint replacement). To provide excellent care, visitors will be limited to two in a room at any given time. Please no children under the age of 15 in the surgical department. School Authority       (11/2015)                                         (COMPLETE BOTH SIDES)                                        Student's Name:   Floyd Armando Birth Date  2009  Sex  male  School   Grade Level/ID#           Page 2 of 3   HEALTH HISTORY      TO BE COMPLETED AND SIGNED BY PARENT/GUARDIAN AND VERIFIED BY HEALTH CARE PROVIDER  ALLERGIES: (Food, drug, insect, other) No has No Known Allergies.   MEDICATION: (List all prescribed or taken on a regular basis.)     Diagnosis of asthma?  Child wakes during night coughing? Yes    No  Yes    No  Loss of function of one of paired  organs?(eye/ear/kidney/testicle) Yes    No    Birth defects? Yes    No  Hospitalizations? Yes    No    Developmental delay? Yes    No   When? What for? Yes    No    Blood disorders? Hemophilia,  Sickle Cell, Other? Explain. Yes    No  Surgery? (List all.)  When? What for? Yes    No    Diabetes? Yes    No  Serious injury or illness? Yes    No    Head injury/Concussion/Passed out? Yes    No  TB skin test positive (past/present)? * Yes    No *If yes, refer to local health  dept   Seizures? What are they like?  Yes    No  TB disease (past or present)? * Yes    No    Heart problem/Shortness of breath?  Yes    No  Tobacco use (type, frequency)?  Yes    No    Heart murmur/High blood pressure? Yes    No  Alcohol/Drug use?  Yes    No    Dizziness or chest pain with exercise? Yes    No  Family history of sudden death  before age 50? (Cause?) Yes    No    Eye/Vision problems? ______           __Glasses          __Contacts  Last exam by eye doctor ______  Other concerns? (crossed eye, drooping lids, squinting, difficulty reading) Dental?   __ Braces     __ Bridge     __ Plate   __Other   Ear/Hearing problems? Yes    No  Information may be shared with appropriate personnel for health and educational purposes.   Bone/Joint problem/injury/scoliosis? Yes    No  Parent/Guardian  Signature                                               Date    PHYSICAL EXAMINATION REQUIREMENTS         Entire section below to be completed by MD//APN/PA Head Circumference if <2-3 years old - /68   Pulse 112   Temp 99.3 °F (37.4 °C) (Temporal)   Ht 4' 1.76\" (1.264 m)   Wt 22.7 kg (49 lb 15 oz)   BMI 14.18 kg/m²   BSA 0.9 m²    10 %ile (Z= -1.25) based on CDC (Boys, 2-20 Years) BMI-for-age based on BMI available as of 9/10/2019.   DIABETES SCREENING (NOT REQUIRED FOR ) BMI>85% age/sex {YES (DEF)/NO (REFRESH):286676::\"No\"}     And any two of the following: Family History: {YES (DEF)/NO (REFRESH):762418::\"No\"}  Ethnic Minority: {YES (DEF)/NO (REFRESH):458117::\"No\"}    Signs of Insulin Resistance (hypertension, dyslipidemia, polycystic ovarian syndrome, acanthosis nigricans): {YES NO DEFAULT:575678::\"No\"}  At Risk: {YES NO DEFAULT:571334::\"No\"}   LEAD RISK QUESTIONNAIRE Required for children age 6 months through 6 years enrolled in licensed or public school operated day care, , nursery school and/or . (Blood test required if resides in [place] or high risk zip code.)  Questionnaire Administered? {YES (DEF)/NO (REFRESH):314752::\"No\"}  Blood Test Indicated? {YES (DEF)/NO (REFRESH):605617::\"No\"}   Blood Test Date _____   Blood Test Result ______________   TB SKIN OR BLOOD TEST Recommended only for children in high-risk groups including children immunosuppressed due to HIV infection or other conditions, frequent travel to or born in high prevalence countries or those exposed to adults in high-risk categories. See CDC guidelines. http://www.cdc.gov/tb/publications/factsheets/testing/TB_testing.htm.    Test Needed: {YES NO DEFAULT:964277::\"No\"}     Test performed: {YES NO DEFAULT:898783::\"No\"}                          Skin Test:    Date Read              /      /             Result:   Positive__      Negative__        mm________                          Blood Test: Date Reported       /      /               Result:  Positive__       Negative__      Value________  LAB TESTS (recommended) Date/Result  Date/Results   Hemoglobin or  Hematocrit NA Sickle Cell (when indicated) NA   Urinalysis {-:975230::\"NA\"} Developmental Screening Tool {-:913998::\"NA\"}        SYSTEM REVIEW Normal  Comments/Follow-up/Needs  Normal Comments/Follow-up/Needs   Skin  Yes  Endocrine Yes    Ears Yes                  Screening Result Gastrointestinal Yes    Eyes Yes                  Screening Result: Genito-Urinary Yes                                            LMP   Nose Yes  Neurologic Yes    Throat Yes  Musculoskeletal Yes    Mouth/Dental Yes  Spinal Exam Yes    Cardiovascular/HTN Yes  Nutritional status Yes    Respiratory Yes Diagnosis of Asthma: No   Mental Health Yes    Currently Prescribed Asthma Medication:        {-:222576::\"No\"}  Quick-relief medication (e.g. Short Acting Beta Antagonist)        No   Controller medication (e.g. inhaled corticosteroid) Other     NEEDS/MODIFICATIONS required in the school setting: No restrictions DIETARY Needs/Restrictions: No restrictions   SPECIAL INSTRUCTIONS/DEVICES e.g. safety glasses, glass eye, chest protector for arrhythmia, pacemaker, prosthetic device, dental bridge, false teeth, athletic support/cup: No restrictions   MENTAL HEALTH/OTHER Is there anything else the school should know about this student?  If you would like to discuss this student’s health with school or school health personnel:  Not needed   EMERGENCY ACTION needed while at school due to child’s health condition (e.g. ,seizures, asthma, insect sting, food, peanut allergy, bleeding problem, diabetes, heart problem)?   No   On the basis of the examination on this day, I approve this child’s participation in                                (If No or Modified please attach explanation.)  PHYSICAL EDUCATION:  Yes                               [employer]   Yes   Print Name  Alexandr Lyman MD         Signature                                                                        Date: 9/10/2019   Address:  [facility] [address] [facility] [address]  [address]  [facility] [address] [phone]         (Complete Both Sides)

## 2022-03-04 ENCOUNTER — HOSPITAL ENCOUNTER (OUTPATIENT)
Age: 82
Discharge: HOME | End: 2022-03-04
Payer: MEDICARE

## 2022-03-04 PROCEDURE — U0005 INFEC AGEN DETEC AMPLI PROBE: HCPCS

## 2022-03-04 PROCEDURE — U0003 INFECTIOUS AGENT DETECTION BY NUCLEIC ACID (DNA OR RNA); SEVERE ACUTE RESPIRATORY SYNDROME CORONAVIRUS 2 (SARS-COV-2) (CORONAVIRUS DISEASE [COVID-19]), AMPLIFIED PROBE TECHNIQUE, MAKING USE OF HIGH THROUGHPUT TECHNOLOGIES AS DESCRIBED BY CMS-2020-01-R: HCPCS

## 2022-03-05 LAB — SARS-COV-2, PCR: NOT DETECTED

## 2022-03-09 ENCOUNTER — ANESTHESIA (OUTPATIENT)
Dept: ENDOSCOPY | Age: 82
End: 2022-03-09
Payer: MEDICARE

## 2022-03-09 ENCOUNTER — ANESTHESIA EVENT (OUTPATIENT)
Dept: ENDOSCOPY | Age: 82
End: 2022-03-09
Payer: MEDICARE

## 2022-03-09 ENCOUNTER — HOSPITAL ENCOUNTER (OUTPATIENT)
Age: 82
Setting detail: OUTPATIENT SURGERY
Discharge: HOME | End: 2022-03-09
Attending: INTERNAL MEDICINE | Admitting: INTERNAL MEDICINE
Payer: MEDICARE

## 2022-03-09 VITALS
SYSTOLIC BLOOD PRESSURE: 191 MMHG | WEIGHT: 117 LBS | DIASTOLIC BLOOD PRESSURE: 100 MMHG | HEART RATE: 76 BPM | TEMPERATURE: 97.6 F | OXYGEN SATURATION: 99 % | RESPIRATION RATE: 18 BRPM | BODY MASS INDEX: 21.53 KG/M2 | HEIGHT: 62 IN

## 2022-03-09 VITALS
SYSTOLIC BLOOD PRESSURE: 205 MMHG | RESPIRATION RATE: 17 BRPM | OXYGEN SATURATION: 100 % | DIASTOLIC BLOOD PRESSURE: 107 MMHG

## 2022-03-09 LAB
GLUCOSE BLD-MCNC: 147 MG/DL (ref 70–99)
PERFORMED ON: ABNORMAL

## 2022-03-09 PROCEDURE — 2720000010 HC SURG SUPPLY STERILE: Performed by: INTERNAL MEDICINE

## 2022-03-09 PROCEDURE — 2500000003 HC RX 250 WO HCPCS: Performed by: NURSE ANESTHETIST, CERTIFIED REGISTERED

## 2022-03-09 PROCEDURE — 3609020800 HC EGD W/EUS FNA: Performed by: INTERNAL MEDICINE

## 2022-03-09 PROCEDURE — 88173 CYTOPATH EVAL FNA REPORT: CPT

## 2022-03-09 PROCEDURE — 6360000002 HC RX W HCPCS: Performed by: NURSE ANESTHETIST, CERTIFIED REGISTERED

## 2022-03-09 PROCEDURE — 7100000011 HC PHASE II RECOVERY - ADDTL 15 MIN: Performed by: INTERNAL MEDICINE

## 2022-03-09 PROCEDURE — 88305 TISSUE EXAM BY PATHOLOGIST: CPT

## 2022-03-09 PROCEDURE — 2709999900 HC NON-CHARGEABLE SUPPLY: Performed by: INTERNAL MEDICINE

## 2022-03-09 PROCEDURE — 3700000000 HC ANESTHESIA ATTENDED CARE: Performed by: INTERNAL MEDICINE

## 2022-03-09 PROCEDURE — 7100000010 HC PHASE II RECOVERY - FIRST 15 MIN: Performed by: INTERNAL MEDICINE

## 2022-03-09 PROCEDURE — 88172 CYTP DX EVAL FNA 1ST EA SITE: CPT

## 2022-03-09 PROCEDURE — 88177 CYTP FNA EVAL EA ADDL: CPT

## 2022-03-09 PROCEDURE — 2580000003 HC RX 258: Performed by: INTERNAL MEDICINE

## 2022-03-09 PROCEDURE — 3700000001 HC ADD 15 MINUTES (ANESTHESIA): Performed by: INTERNAL MEDICINE

## 2022-03-09 RX ORDER — SODIUM CHLORIDE, SODIUM LACTATE, POTASSIUM CHLORIDE, CALCIUM CHLORIDE 600; 310; 30; 20 MG/100ML; MG/100ML; MG/100ML; MG/100ML
INJECTION, SOLUTION INTRAVENOUS CONTINUOUS
Status: DISCONTINUED | OUTPATIENT
Start: 2022-03-09 | End: 2022-03-09 | Stop reason: HOSPADM

## 2022-03-09 RX ORDER — METOPROLOL TARTRATE 5 MG/5ML
INJECTION INTRAVENOUS PRN
Status: DISCONTINUED | OUTPATIENT
Start: 2022-03-09 | End: 2022-03-09 | Stop reason: SDUPTHER

## 2022-03-09 RX ORDER — LIDOCAINE HYDROCHLORIDE 20 MG/ML
INJECTION, SOLUTION INFILTRATION; PERINEURAL PRN
Status: DISCONTINUED | OUTPATIENT
Start: 2022-03-09 | End: 2022-03-09 | Stop reason: SDUPTHER

## 2022-03-09 RX ORDER — PROPOFOL 10 MG/ML
INJECTION, EMULSION INTRAVENOUS PRN
Status: DISCONTINUED | OUTPATIENT
Start: 2022-03-09 | End: 2022-03-09 | Stop reason: SDUPTHER

## 2022-03-09 RX ADMIN — METOPROLOL TARTRATE 1 MG: 5 INJECTION INTRAVENOUS at 11:19

## 2022-03-09 RX ADMIN — LIDOCAINE HYDROCHLORIDE 100 MG: 20 INJECTION, SOLUTION INFILTRATION; PERINEURAL at 11:06

## 2022-03-09 RX ADMIN — METOPROLOL TARTRATE 1 MG: 5 INJECTION INTRAVENOUS at 11:29

## 2022-03-09 RX ADMIN — SODIUM CHLORIDE, POTASSIUM CHLORIDE, SODIUM LACTATE AND CALCIUM CHLORIDE: 600; 310; 30; 20 INJECTION, SOLUTION INTRAVENOUS at 10:25

## 2022-03-09 RX ADMIN — LIDOCAINE HYDROCHLORIDE 100 MG: 20 INJECTION, SOLUTION INFILTRATION; PERINEURAL at 10:50

## 2022-03-09 RX ADMIN — SODIUM CHLORIDE, POTASSIUM CHLORIDE, SODIUM LACTATE AND CALCIUM CHLORIDE: 600; 310; 30; 20 INJECTION, SOLUTION INTRAVENOUS at 10:49

## 2022-03-09 RX ADMIN — LIDOCAINE HYDROCHLORIDE 20 MG: 20 INJECTION, SOLUTION INFILTRATION; PERINEURAL at 11:17

## 2022-03-09 RX ADMIN — PROPOFOL 20 MG: 10 INJECTION, EMULSION INTRAVENOUS at 10:52

## 2022-03-09 RX ADMIN — PROPOFOL 40 MG: 10 INJECTION, EMULSION INTRAVENOUS at 10:50

## 2022-03-09 ASSESSMENT — PULMONARY FUNCTION TESTS
PIF_VALUE: 0
PIF_VALUE: 1
PIF_VALUE: 0
PIF_VALUE: 1
PIF_VALUE: 0

## 2022-03-09 ASSESSMENT — PAIN - FUNCTIONAL ASSESSMENT: PAIN_FUNCTIONAL_ASSESSMENT: 0-10

## 2022-03-09 NOTE — PROGRESS NOTES
Discharge instructions given to spouse sebastian. Sebastian and patient verbalized understanding. Patient dressing at this time.

## 2022-03-09 NOTE — ANESTHESIA POSTPROCEDURE EVALUATION
Department of Anesthesiology  Postprocedure Note    Patient: Milly Thomas  MRN: [id number]  YOB: 1940  Date of evaluation: 3/9/2022  Time:  3:11 PM     Procedure Summary     Date: 03/09/22 Room / Location: [facility]O 01 / [facility]    Anesthesia Start: 1025 Anesthesia Stop: 1129    Procedure: EGD W/EUS FNA (N/A ) Diagnosis: (DILATED BILE DUCT, ABNORMAL MRCP)    Surgeons: Bindu Reagan MD Responsible Provider: Hyacinth Granados MD    Anesthesia Type: MAC ASA Status: 3          Anesthesia Type: MAC    Courtney Phase I: Courtney Score: 9    Courtney Phase II: Courtney Score: 10    Last vitals: Reviewed and per EMR flowsheets.        Anesthesia Post Evaluation    Comments: Postoperative Anesthesia Note    Name:    Milly Thomas  MRN:      [id number]    Patient Vitals in the past 12 hrs:  03/09/22 1159, BP:(!) 191/100, Temp:97.6 °F (36.4 °C), Temp src:Infrared, Pulse:76, Resp:18, SpO2:99 %  03/09/22 1135, BP:(!) 190/100, Pulse:72, Resp:16, SpO2:98 %  03/09/22 1127, BP:(!) 195/108, Temp:97.7 °F (36.5 °C), Temp src:Temporal, Pulse:76, Resp:18, SpO2:100 %  03/09/22 0959, BP:(!) 182/86, Temp:96.7 °F (35.9 °C), Temp src:Temporal, Pulse:78, Resp:16, SpO2:99 %, Height:5' 2\" (1.575 m), Weight:117 lb (53.1 kg)     LABS:    CBC  Lab Results       Component                Value               Date/Time                  WBC                      6.5                 02/17/2022 10:05 AM        HGB                      11.6 (L)            02/17/2022 10:05 AM        HCT                      34.2 (L)            02/17/2022 10:05 AM        PLT                      202                 02/17/2022 10:05 AM   RENAL  Lab Results       Component                Value               Date/Time                  NA                       134 (L)             02/17/2022 10:05 AM        K                        4.3                 02/17/2022 10:05 AM        CL                       100                 02/17/2022

## 2022-03-09 NOTE — BRIEF OP NOTE
Brief Postoperative Note      Patient: Cristela Wilhelm  YOB: 1940  MRN: [id number]    Date of Procedure: 3/9/2022    Pre-Op Diagnosis: DILATED BILE DUCT, ABNORMAL MRCP    Post-Op Diagnosis: 1.5 cm pancreas lesion s/p FNA       Procedure(s):  EGD W/EUS FNA    Surgeon(s):  Aaliyah Zuniga MD    Assistant:  * No surgical staff found *    Anesthesia: Monitor Anesthesia Care    Estimated Blood Loss (mL): Minimal    Complications: None    Specimens:   ID Type Source Tests Collected by Time Destination   A :  Tissue Biopsy CYTOLOGY, NON-GYN Aaliyah Zuniga MD 3/9/2022 1106        Implants:  * No implants in log *      Drains: * No LDAs found *    Findings: 1.5 cm pancreas lesion s/p FNA    Electronically signed by Aaliyah Zuniga MD on 3/9/2022 at 11:58 AM

## 2022-03-09 NOTE — ANESTHESIA PRE PROCEDURE
Department of Anesthesiology  Preprocedure Note       Name:  Deni Bocanegra   Age:  [de-identified] y.o.  :  1940                                          MRN:  [id number]         Date:  3/9/2022      Surgeon: Archana Chung):  [place] MD    Procedure: Procedure(s):  UPPER EUS W/ANES. (10:45)    Medications prior to admission:   Prior to Admission medications    Medication Sig Start Date End Date Taking?  Authorizing Provider   apixaban (ELIQUIS) 5 MG TABS tablet Take 1 tablet by mouth 2 times daily 21  Yes Abi Ruano MD   metoprolol tartrate (LOPRESSOR) 25 MG tablet Take 1 tablet by mouth 2 times daily 21  Yes Abi Ruano MD   omeprazole (PRILOSEC) 40 MG delayed release capsule Take 40 mg by mouth 2 times daily    Historical Provider, MD   acyclovir (ZOVIRAX) 800 MG tablet Take 400 mg by mouth 2 times daily     Historical Provider, MD   Coenzyme Q10 (COQ10) 100 MG CAPS Take 1 capsule by mouth daily    Historical Provider, MD   calcium carbonate (OSCAL) 500 MG TABS tablet Take 500 mg by mouth daily    Historical Provider, MD   vitamin D (CHOLECALCIFEROL) 1000 UNIT TABS tablet Take 1,000 Units by mouth every other day     Historical Provider, MD   Probiotic Product (PROBIOTIC ADVANCED PO) Take 1 tablet by mouth daily     Historical Provider, MD   oxybutynin (DITROPAN) 5 MG tablet TAKE 1 TABLET BY MOUTH 2 TIMES DAILY 17   Viki Fail, APRN - CNP   Omega-3 Fatty Acids (OMEGA-3 FISH OIL) 1000 MG CAPS Take 1 capsule by mouth 16   Historical Provider, MD   Misc Natural Products (OSTEO BI-FLEX ADV DOUBLE ST PO) Take 1 tablet by mouth daily     Historical Provider, MD   metFORMIN (GLUCOPHAGE) 500 MG tablet Take 1 tablet by mouth 2 times daily (with meals) 6/2/15   Herman Fountain MD   atorvastatin (LIPITOR) 10 MG tablet Take 1 tablet by mouth daily 6/2/15   Herman Fountain MD   fluticasone (FLONASE) 50 MCG/ACT nasal spray 2 sprays by Nasal route daily  Patient taking differently: 2 sprays by Nasal route daily as needed  6/2/15   Amanda Peter MD       Current medications:    Current Facility-Administered Medications   Medication Dose Route Frequency Provider Last Rate Last Admin    lactated ringers infusion   IntraVENous Continuous Francisco Javier Kristopher Comer MD           Allergies: Allergies   Allergen Reactions    Sulfa Antibiotics Anaphylaxis    Dicyclomine Hcl Hives, Itching and Rash    Diclofenac      Other reaction(s): swelling/H&P    Lisinopril Other (See Comments)     Leg cramping     Penicillins      blisters    Sulfamethoxazole-Trimethoprim [Bactrim] Other (See Comments)     WHITE SPOTS IN MOUTH       Problem List:    Patient Active Problem List   Diagnosis Code    Diabetes mellitus without complication ([facility]) R98.3    Hypertension I10    Hyperlipidemia E78.5    Post-menopausal bleeding N95.0    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, grade 1, uterine cancer with myometrial invasion 2 mm out of 19 mm with no cervical involvement, no lymphvascular space invasion, and all pelvic lymph nodes are negative    GERD (gastroesophageal reflux disease)     Hyperlipidemia     Hypertension     Nausea     PAF (paroxysmal atrial fibrillation) ([facility])     Pancreas divisum     Right carpal tunnel syndrome 09/07/2016    Type II or unspecified type diabetes mellitus without mention of complication, not stated as uncontrolled        Past Surgical History:        Procedure Laterality Date    BLADDER SURGERY      tuck    BREAST BIOPSY      CARPAL TUNNEL RELEASE      LEFT HAND    CARPAL TUNNEL RELEASE Right     CHOLECYSTECTOMY      ERCP N/A 2/17/2022    ENDOSCOPIC RETROGRADE CHOLANGIOPANCREATOGRAPHY performed by Maryam Torres MD at [address] [place] [address] AND O  2011    he patient underwent her staging procedure consisting of a hysterectomy, bilateral salpingo-oophorectomy, and bilateral pelvic lymph node dissection in March of 2011 under the direction of Dr. Demond Reilly. The patient followed with Dr. Demond Reilly until he moved to another location outside of the [facility]. The patient has had no evidence of disease to date of 7-       Social History:    Social History     Tobacco Use    Smoking status: Never Smoker    Smokeless tobacco: Never Used   Substance Use Topics    Alcohol use: No                                Counseling given: Not Answered      Vital Signs (Current):   Vitals:    03/03/22 1320 03/09/22 0959   BP:  (!) 182/86   Pulse:  78   Resp:  16   Temp:  96.7 °F (35.9 °C)   TempSrc:  Temporal   SpO2:  99%   Weight: 117 lb (53.1 kg) 117 lb (53.1 kg)   Height: 5' 2\" (1.575 m) 5' 2\" (1.575 m)                                              BP Readings from Last 3 Encounters:   03/09/22 (!) 182/86   03/09/22 (!) 177/88   02/17/22 139/63       NPO Status: Time of last liquid consumption: 0730 (sip with meds)                        Time of last solid consumption: 2200                        Date of last liquid consumption: 03/09/22                        Date of last solid food consumption: 03/08/22    BMI:   Wt Readings from Last 3 Encounters:   03/09/22 117 lb (53.1 kg)   02/09/22 119 lb (54 kg)   09/22/21 125 lb 1.9 oz (56.8 kg)     Body mass index is 21.4 kg/m².     CBC:   Lab Results   Component Value Date    WBC 6.5 02/17/2022    RBC 3.73 02/17/2022    HGB 11.6 02/17/2022    HCT 34.2 02/17/2022    MCV 91.8 02/17/2022    RDW 14.3 02/17/2022     02/17/2022       CMP:   Lab Results   Component Value Date     02/17/2022    K 4.3 02/17/2022     02/17/2022    CO2 24 02/17/2022    BUN 12 02/17/2022    CREATININE <0.5 02/17/2022    GFRAA >60 02/17/2022    GFRAA >60 05/30/2013    AGRATIO 1.6 08/16/2021    LABGLOM >60 02/17/2022    GLUCOSE 144 02/17/2022    PROT 6.0 08/16/2021    PROT 7.0 02/15/2012    CALCIUM 9.6 02/17/2022    BILITOT 0.4 08/16/2021    ALKPHOS 69 08/16/2021    AST 22 08/16/2021    ALT 16 08/16/2021       POC Tests:   Recent Labs     03/09/22  1013   POCGLU 147*       Coags:   Lab Results   Component Value Date    PROTIME 11.0 09/04/2014    INR 1.02 09/04/2014       HCG (If Applicable): No results found for: PREGTESTUR, PREGSERUM, HCG, HCGQUANT     ABGs: No results found for: PHART, PO2ART, PAG7NBA, QGE9EYB, BEART, V1PLTOOW     Type & Screen (If Applicable):  No results found for: LABABO, LABRH    Drug/Infectious Status (If Applicable):  No results found for: HIV, HEPCAB    COVID-19 Screening (If Applicable):   Lab Results   Component Value Date    COVID19 Not Detected 03/04/2022           Anesthesia Evaluation  Patient summary reviewed and Nursing notes reviewed no history of anesthetic complications:   Airway: Mallampati: II     Neck ROM: full   Dental:          Pulmonary:                              Cardiovascular:    (+) hypertension:, dysrhythmias: atrial fibrillation,                   Neuro/Psych:   (+) neuromuscular disease:,             GI/Hepatic/Renal:   (+) GERD:,           Endo/Other:    (+) Diabetes, . Abdominal:             Vascular: Other Findings:             Anesthesia Plan      MAC     ASA 3     (Medications & allergies reviewed  All available lab & EKG data reviewed)  Induction: intravenous. Anesthetic plan and risks discussed with patient.       Plan discussed with Chico Hendricks MD   3/9/2022

## 2022-03-09 NOTE — H&P
History and Physical / Pre-Sedation Assessment    Patient:  Milly Thomas   :   1940     Intended Procedure:  EUS    HPI: [de-identified]year old female with pancreas lesion seen on MRCP    Past Medical History:   Diagnosis Date    Arthritis of left knee     COVID-19 2021    Endometrioid adenocarcinoma 2011    history of stage I-A, grade 1, uterine cancer with myometrial invasion 2 mm out of 19 mm with no cervical involvement, no lymphvascular space invasion, and all pelvic lymph nodes are negative    GERD (gastroesophageal reflux disease)     Hyperlipidemia     Hypertension     Nausea     PAF (paroxysmal atrial fibrillation) (HCC)     Pancreas divisum     Right carpal tunnel syndrome 2016    Type II or unspecified type diabetes mellitus without mention of complication, not stated as uncontrolled      Past Surgical History:   Procedure Laterality Date    BLADDER SURGERY      tuck    BREAST BIOPSY      CARPAL TUNNEL RELEASE      LEFT HAND    CARPAL TUNNEL RELEASE Right     CHOLECYSTECTOMY      ERCP N/A 2022    ENDOSCOPIC RETROGRADE CHOLANGIOPANCREATOGRAPHY performed by Tanvir Stephens MD at [address] [place] [address] AND [facility]      he patient underwent her staging procedure consisting of a hysterectomy, bilateral salpingo-oophorectomy, and bilateral pelvic lymph node dissection in 2011 under the direction of Dr. Alem Cross. The patient followed with Dr. Alem Cross until he moved to another location outside of the [facility]. The patient has had no evidence of disease to date of 2014       Medications reviewed  Prior to Admission medications    Medication Sig Start Date End Date Taking?  Authorizing Provider   apixaban (ELIQUIS) 5 MG TABS tablet Take 1 tablet by mouth 2 times daily 21  Yes Melinda Mcdaniel MD   metoprolol tartrate (LOPRESSOR) 25 MG tablet Take 1 tablet by mouth 2 times daily 21  Yes Melinda Mcdaniel MD   omeprazole (PRILOSEC) 40 MG delayed release capsule Take 40 mg by mouth 2 times daily    Historical Provider, MD   acyclovir (ZOVIRAX) 800 MG tablet Take 400 mg by mouth 2 times daily     Historical Provider, MD   Coenzyme Q10 (COQ10) 100 MG CAPS Take 1 capsule by mouth daily    Historical Provider, MD   calcium carbonate (OSCAL) 500 MG TABS tablet Take 500 mg by mouth daily    Historical Provider, MD   vitamin D (CHOLECALCIFEROL) 1000 UNIT TABS tablet Take 1,000 Units by mouth every other day     Historical Provider, MD   Probiotic Product (PROBIOTIC ADVANCED PO) Take 1 tablet by mouth daily     Historical Provider, MD   oxybutynin (DITROPAN) 5 MG tablet TAKE 1 TABLET BY MOUTH 2 TIMES DAILY 5/2/17   SARA Rose CNP   Omega-3 Fatty Acids (OMEGA-3 FISH OIL) 1000 MG CAPS Take 1 capsule by mouth 6/11/16   Historical Provider, MD   [facility] Products (OSTEO BI-FLEX ADV DOUBLE ST PO) Take 1 tablet by mouth daily     Historical Provider, MD   metFORMIN (GLUCOPHAGE) 500 MG tablet Take 1 tablet by mouth 2 times daily (with meals) 6/2/15   Alexandra Hatch MD   atorvastatin (LIPITOR) 10 MG tablet Take 1 tablet by mouth daily 6/2/15   Alexandra Hatch MD   fluticasone (FLONASE) 50 MCG/ACT nasal spray 2 sprays by Nasal route daily  Patient taking differently: 2 sprays by Nasal route daily as needed  6/2/15   Alexandra Hatch MD        Allergies: Allergies   Allergen Reactions    Sulfa Antibiotics Anaphylaxis    Dicyclomine Hcl Hives, Itching and Rash    Diclofenac      Other reaction(s): swelling/H&P    Lisinopril Other (See Comments)     Leg cramping     Penicillins      blisters    Sulfamethoxazole-Trimethoprim [Bactrim] Other (See Comments)     WHITE SPOTS IN MOUTH       Nurses notes reviewed and agreed.     Physical Exam:  Vital Signs: BP (!) 182/86   Pulse 78   Temp 96.7 °F (35.9 °C) (Temporal)   Resp 16   Ht 5' 2\" (1.575 m)   Wt 117 lb (53.1 kg)   LMP 06/12/1991 (Approximate)   SpO2 99%   BMI 21.40 kg/m²    Airway: Mallampati: II (soft palate, uvula, fauces visible)  Pulmonary:Normal  Cardiac:Normal  Abdomen:Normal    Pre-Procedure Assessment / Plan:  ASA: Class 3 - A patient with severe systemic disease that limits activity but is not incapacitating  Level of Sedation Plan: Moderate sedation  Post Procedure plan: Return to same level of care    I assessed the patient and find that the patient is in satisfactory condition to proceed with the planned procedure and sedation plan. I have explained the risk, benefits, and alternatives to the procedure; the patient understands and agrees to proceed.        Jovany Schmidt MD  3/9/2022

## 2022-03-09 NOTE — OP NOTE
[address]                 [address] [facility], [address]                                OPERATIVE REPORT    PATIENT NAME: Ravinder Wilcox                      :        1940  MED REC NO:   [id number]                          ROOM:  ACCOUNT NO:   [de-identified]                           ADMIT DATE: 2022  PROVIDER:     Aguila Moralez MD    DATE OF PROCEDURE:  2022    PREPROCEDURE DIAGNOSES:  1. Dilated bile duct. 2.  Dilated pancreas duct. 3.  Abnormal MRI suggestive of pancreas head mass. POSTPROCEDURE DIAGNOSES:  1.  A 1.5 cm x 0.9 cm pancreas head mass that had lesion, status post  FNA. 2.  Moderate dilation of the common bile duct measuring 10 mm, pancreas  duct dilation of 11 mm.  3.  Atrophic pancreas. PROCEDURE PERFORMED:  EUS with FNA. SURGEON:  Aguila Moralez MD    MEDICATIONS:  MAC per Anesthesia. INDICATION FOR PROCEDURE:  This is an 82-year-old female with history of  hypertension, hyperlipidemia, endometrial carcinoma, atrial  fibrillation, diabetes, presents for endoscopic ultrasound. She was  recently found to have abnormal CT scan MRI, which was showing dilated  bile duct and pancreas duct with possible stricture. ERCP was  attempted, but was unsuccessful. Further workup with MRCP showed  nodular enhancing tissue of the pancreas head/uncinate process measuring  1.2 cm x 9 mm. EUS was requested for further diagnostics. DETAILS OF PROCEDURE:  Informed consent obtained after discussing risks,  benefits, and alternatives. Full history and physical was performed. The patient was classified as ASA class III. Cardiopulmonary status was  continuously monitored throughout the procedure. The patient was placed  in left lateral decubitus position.   Once adequately sedated and  positioned, a standard linear echoendoscope was inserted in the mouth  and advanced under direct visualization to second portion of the  duodenum. The entire mucosa, esophagus, stomach, and duodenum was  examined carefully. Ultrasound images of the mediastinum in AP window,  subcarinal space were performed. The scope was then advanced to the  stomach where ultrasound images of the left lobe of the liver, aorta,  celiac axis, pancreas body, tail, pancreatic duct, splenic artery,  splenic vein, left adrenal gland, left kidney, and spleen were  visualized. The scope was then advanced to the duodenum where  ultrasound images of the pancreatic head, uncinate process, portal vein,  and SMV were all visualized. The patient tolerated the procedure well  without any difficulties. FINDINGS:  ESOPHAGUS:  Examined esophagus appeared normal.  Ultrasound images of  the mediastinum in AP window and subcarinal space were negative for  lymphadenopathy. STOMACH:  Examined portion of stomach appeared normal.  Ultrasound  images of the left lobe of the liver showed normal parenchyma without  any mass lesions or intrahepatic ductal dilation. Aorta and celiac axis  were negative for lymphadenopathy. Pancreas body and tail were examined  carefully. The pancreas body and tail had moderate-to-severe atrophy. Pancreas duct measured 7.7 mm, as it coursed towards the tail. Splenic  artery and splenic vein appeared normal.  The left adrenal gland, left  kidney, and spleen were briefly visualized to be normal as well. DUODENUM:  Examined portion of the duodenum appeared normal.  Pancreas  parenchyma again had moderate-to-severe atrophy. There was moderate  dilation of the pancreas duct measuring 10.5 mm, common bile duct  measured 10.6 mm. They converse to the papilla. There was a 1.5 cm x 9  mm soft tissue lesion near the papilla. This was sampled using 22-gauge  SYSCO needle. Samples were sent for cytology x3. There was moderate hemorrhage during this intervention, which eventually  subsided.   Portal vein and SMV appeared normal.    SUMMARY:  1.  A 1.5 cm x 9 mm pancreas head lesion, status post FNA. 2.  Moderate-to-severe pancreas and common duct dilation. 3.  Severe pancreas atrophy. RECOMMENDATIONS:  1. Discharge the patient to home when standard parameters are met. 2.  Await final cytology results. 3.  Monitor LFTs in 2 to 4 weeks. 4.  Further workup and management to be decided pending biopsy results. Follow up with Dr. Campbell [facility] as scheduled.     EBL: <5mL    Tanisha Raman MD    D: 03/09/2022 11:54:04       T: 03/09/2022 11:57:29     [id number]  Job#: [id number]     Doc#: [id number]    CC:  Sherryle Pluck

## 2022-03-10 ENCOUNTER — HOSPITAL ENCOUNTER (OUTPATIENT)
Age: 82
Discharge: HOME | End: 2022-03-10
Payer: MEDICARE

## 2022-03-10 LAB
A/G RATIO: 2.2 (ref 1.1–2.2)
ALBUMIN SERPL-MCNC: 4.3 G/DL (ref 3.4–5)
ALP BLD-CCNC: 268 U/L (ref 40–129)
ALT SERPL-CCNC: 875 U/L (ref 10–40)
ANION GAP SERPL CALCULATED.3IONS-SCNC: 14 MMOL/L (ref 3–16)
AST SERPL-CCNC: 785 U/L (ref 15–37)
BASOPHILS ABSOLUTE: 0 K/UL (ref 0–0.2)
BASOPHILS RELATIVE PERCENT: 0.5 %
BILIRUB SERPL-MCNC: 2.1 MG/DL (ref 0–1)
BUN BLDV-MCNC: 18 MG/DL (ref 7–20)
CALCIUM SERPL-MCNC: 9.5 MG/DL (ref 8.3–10.6)
CHLORIDE BLD-SCNC: 98 MMOL/L (ref 99–110)
CO2: 23 MMOL/L (ref 21–32)
CREAT SERPL-MCNC: <0.5 MG/DL (ref 0.6–1.2)
EOSINOPHILS ABSOLUTE: 0 K/UL (ref 0–0.6)
EOSINOPHILS RELATIVE PERCENT: 0.3 %
GFR AFRICAN AMERICAN: >60
GFR NON-AFRICAN AMERICAN: >60
GLUCOSE BLD-MCNC: 269 MG/DL (ref 70–99)
HCT VFR BLD CALC: 34.4 % (ref 36–48)
HEMOGLOBIN: 11.4 G/DL (ref 12–16)
LIPASE: 130 U/L (ref 13–60)
LYMPHOCYTES ABSOLUTE: 1.6 K/UL (ref 1–5.1)
LYMPHOCYTES RELATIVE PERCENT: 21.6 %
MCH RBC QN AUTO: 30.9 PG (ref 26–34)
MCHC RBC AUTO-ENTMCNC: 33.2 G/DL (ref 31–36)
MCV RBC AUTO: 93.2 FL (ref 80–100)
MONOCYTES ABSOLUTE: 0.4 K/UL (ref 0–1.3)
MONOCYTES RELATIVE PERCENT: 5.4 %
NEUTROPHILS ABSOLUTE: 5.2 K/UL (ref 1.7–7.7)
NEUTROPHILS RELATIVE PERCENT: 72.2 %
PDW BLD-RTO: 14.1 % (ref 12.4–15.4)
PLATELET # BLD: 214 K/UL (ref 135–450)
PMV BLD AUTO: 8.3 FL (ref 5–10.5)
POTASSIUM SERPL-SCNC: 4.3 MMOL/L (ref 3.5–5.1)
RBC # BLD: 3.69 M/UL (ref 4–5.2)
SODIUM BLD-SCNC: 135 MMOL/L (ref 136–145)
TOTAL PROTEIN: 6.3 G/DL (ref 6.4–8.2)
WBC # BLD: 7.2 K/UL (ref 4–11)

## 2022-03-10 PROCEDURE — 83690 ASSAY OF LIPASE: CPT

## 2022-03-10 PROCEDURE — 85025 COMPLETE CBC W/AUTO DIFF WBC: CPT

## 2022-03-10 PROCEDURE — 80053 COMPREHEN METABOLIC PANEL: CPT

## 2022-03-17 ENCOUNTER — HOSPITAL ENCOUNTER (OUTPATIENT)
Age: 82
Discharge: HOME | End: 2022-03-17
Payer: MEDICARE

## 2022-03-17 PROCEDURE — 82378 CARCINOEMBRYONIC ANTIGEN: CPT

## 2022-03-17 PROCEDURE — 86301 IMMUNOASSAY TUMOR CA 19-9: CPT

## 2022-03-17 PROCEDURE — 36415 COLL VENOUS BLD VENIPUNCTURE: CPT

## 2022-03-17 PROCEDURE — 80074 ACUTE HEPATITIS PANEL: CPT

## 2022-03-18 LAB
CEA: 1.7 NG/ML (ref 0–5)
HAV IGM SER IA-ACNC: NORMAL
HEPATITIS B CORE IGM ANTIBODY: NORMAL
HEPATITIS B SURFACE ANTIGEN INTERPRETATION: NORMAL
HEPATITIS C ANTIBODY INTERPRETATION: NORMAL

## 2022-03-22 LAB — CA 19-9: 56 U/ML (ref 0–35)

## 2022-04-14 ENCOUNTER — HOSPITAL ENCOUNTER (OUTPATIENT)
Dept: PET IMAGING | Age: 82
Discharge: HOME | End: 2022-04-14
Payer: MEDICARE

## 2022-04-14 DIAGNOSIS — K86.89 PANCREATIC MASS: ICD-10-CM

## 2022-04-14 PROCEDURE — 78815 PET IMAGE W/CT SKULL-THIGH: CPT

## 2022-04-14 PROCEDURE — A9552 F18 FDG: HCPCS | Performed by: INTERNAL MEDICINE

## 2022-04-14 PROCEDURE — 3430000000 HC RX DIAGNOSTIC RADIOPHARMACEUTICAL: Performed by: INTERNAL MEDICINE

## 2022-04-14 RX ORDER — FLUDEOXYGLUCOSE F 18 200 MCI/ML
15 INJECTION, SOLUTION INTRAVENOUS
Status: COMPLETED | OUTPATIENT
Start: 2022-04-14 | End: 2022-04-14

## 2022-04-14 RX ADMIN — FLUDEOXYGLUCOSE F 18 15 MILLICURIE: 200 INJECTION, SOLUTION INTRAVENOUS at 12:34

## 2022-04-25 NOTE — PROGRESS NOTES
CARDIOLOGY FOLLOW UP        Patient Name: Krista Foreman  Primary Care physician: Miguelina Davis MD    Reason for Referral/Chief Complaint: Krista Foreman is a [de-identified] y.o. patient who is referred to cardiology clinic today for evaluation and treatment of atrial fibrillation diagnosed during  admission 8/16-8/18/21 with COVID-19. History of Present Illness:   Krista Foreman is an [de-identified] y.o. patient with prior history notable for hyperlipidemia, hypertension, diabetes type 2, COVID-19 9/2021, pancreatic mass, who presented to the hospital 8/16/2021 with complaints of generalized weakness and recent syncopal episode. On admission on the floor patient was noted to have a brief episode of atrial fibrillation with rapid ventricular rates lasting approximately 2 hours in the early a.m. EKG showed atrial fibrillation with heart rate 141 bpm and nonspecific ST abnormality. Last OV 09/22/2021She returned to the office today with her . She reported that she was doing well. She noted that she had no symptoms while she was in atrial fibrillation in the hospital.       In the interm, 10/14/21 Echo with EF 55%, interatrial septal aneurysm, mild thick anterior leaflet of mitral valve. Trivial mild regurg. Aortic valve mild thick. Inadequate tricusp regurg. 02/17/2022 EKG- NSR with possible anterior infarct, abnormal ECG When compared with ECG of 17-AUG-2021. On 02/17/2022 she underwent endoscopic retrograde cholangio pancreatogram.  She had biopsy taken for known pancreatic mass which returned atypical cells but inconclusive ultimately. She went on to have PET CT on 04/14/2022 revealing hypermetabolic pancreatic head mass most compatible with malignancy. Findings are associated with pancreatic atrophy, pancreatic ductal dilatation, and biliary dilatation. No hypermetabolic metastatic disease in the chest, abdomen and pelvis. Today, here with spouse.   She is scheduled to see her surgeon Dr. Lakeisha López at [facility] on 5/16/22. No new swelling in lower extremities. Admits has lost over 20 pounds after covid. Says does not feel like eating certain foods. She denies any evidence of hematemesis, hemoptysis, melena, hematochezia or hematuria with blood thinner. Denies abdominal pain. Denies nausea or vomiting. Denies loose stools. The patient denies chest pain, shortness of breath at rest and dyspnea with exertion. Denies palpitations, dizziness, near-syncope or recurrent syncope. Denies paroxysmal nocturnal dyspnea, orthopnea, increasing lower extremity edema or rapid weight gain. The patient is compliant with medications. Cost of medications is affordable. No endorsed side effects. Past Medical History:   has a past medical history of Arthritis of left knee, COVID-19, Endometrioid adenocarcinoma, GERD (gastroesophageal reflux disease), Hyperlipidemia, Hypertension, Nausea, PAF (paroxysmal atrial fibrillation) ([facility] [address] Pancreas divisum, Right carpal tunnel syndrome, and Type II or unspecified type diabetes mellitus without mention of complication, not stated as uncontrolled. Surgical History:   has a past surgical history that includes Cholecystectomy; Bladder surgery; Carpal tunnel release; halima and bso (cervix removed) (2011); Carpal tunnel release (Right); Breast biopsy; Hysterectomy; ERCP (N/A, 2/17/2022); other surgical history (03/09/2022); and Upper gastrointestinal endoscopy (N/A, 3/9/2022). Social History:   reports that she has never smoked. She has never used smokeless tobacco. She reports that she does not drink alcohol and does not use drugs. Family History:  family history includes Diabetes in her brother and mother; Heart Disease in her mother; High Blood Pressure in her mother; Stroke in her father. Home Medications:  Were reviewed and are listed in nursing record and/or below  Prior to Admission medications    Medication Sig Start Date End Date Taking?  Authorizing Provider omeprazole (PRILOSEC) 40 MG delayed release capsule Take 40 mg by mouth 2 times daily   Yes Historical Provider, MD   apixaban (ELIQUIS) 5 MG TABS tablet Take 1 tablet by mouth 2 times daily 9/22/21  Yes Luci Garcia MD   metoprolol tartrate (LOPRESSOR) 25 MG tablet Take 1 tablet by mouth 2 times daily 9/22/21  Yes Luci Garcia MD   acyclovir (ZOVIRAX) 800 MG tablet Take 400 mg by mouth 2 times daily    Yes Historical Provider, MD   Coenzyme Q10 (COQ10) 100 MG CAPS Take 1 capsule by mouth daily   Yes Historical Provider, MD   calcium carbonate (OSCAL) 500 MG TABS tablet Take 500 mg by mouth daily   Yes Historical Provider, MD   vitamin D (CHOLECALCIFEROL) 1000 UNIT TABS tablet Take 1,000 Units by mouth every other day    Yes Historical Provider, MD   Probiotic Product (PROBIOTIC ADVANCED PO) Take 1 tablet by mouth daily    Yes Historical Provider, MD   oxybutynin (DITROPAN) 5 MG tablet TAKE 1 TABLET BY MOUTH 2 TIMES DAILY 5/2/17  Yes SARA Cohn CNP   Omega-3 Fatty Acids (OMEGA-3 FISH OIL) 1000 MG CAPS Take 1 capsule by mouth 6/11/16  Yes Historical Provider, MD   Misc Natural Products (OSTEO BI-FLEX ADV DOUBLE ST PO) Take 1 tablet by mouth daily    Yes Historical Provider, MD   metFORMIN (GLUCOPHAGE) 500 MG tablet Take 1 tablet by mouth 2 times daily (with meals) 6/2/15  Yes Som Mcnair MD   atorvastatin (LIPITOR) 10 MG tablet Take 1 tablet by mouth daily 6/2/15  Yes Som Mcnair MD   fluticasone (FLONASE) 50 MCG/ACT nasal spray 2 sprays by Nasal route daily  Patient taking differently: 2 sprays by Nasal route daily as needed  6/2/15  Yes Som Mcnair MD        CURRENT Medications:  No current facility-administered medications for this visit. Allergies:  Sulfa antibiotics, Dicyclomine hcl, Diclofenac, Lisinopril, Penicillins, and Sulfamethoxazole-trimethoprim [bactrim]     Review of Systems:   A 14 point review of symptoms completed.  Pertinent positives identified in the HPI, all other review of symptoms negative as below. Objective:     Vitals:    05/02/22 1235 05/02/22 1238   BP: (!) 146/64 (!) 140/66   Pulse: 76    Temp: 98.3 °F (36.8 °C)    SpO2: 97%    Weight: 117 lb 6.4 oz (53.3 kg)    Height: 5' 2\" (1.575 m)        Wt Readings from Last 3 Encounters:   05/02/22 117 lb 6.4 oz (53.3 kg)   03/09/22 117 lb (53.1 kg)   02/09/22 119 lb (54 kg)      Weight: 117 lb 6.4 oz (53.3 kg)       PHYSICAL EXAM:    General:   Elderly woman in no acute distress   Head:  Normocephalic, atraumatic   Eyes:  Conjunctiva/corneas clear, anicteric sclerae    Nose: Nares normal, no drainage or sinus tenderness   Throat: No abnormalities of the lips, oral mucosa or tongue. Moist mucous membranes. Neck: Trachea midline. Neck supple with no lymphadenopathy, thyroid not enlarged, symmetric, no tenderness/mass/nodules   Lungs:   Clear to auscultation bilaterally, no wheezes, no rales, no respiratory distress   Chest Wall:  No deformity or tenderness to palpation   Heart:  Regular rate and rhythm, normal S1, normal S2, no murmur, no rub, no S3/S4, PMI non-displaced. Abdomen:   Soft, non-tender, with normoactive bowel sounds. No masses, no hepatosplenomegaly   Extremities: No cyanosis, clubbing. No pitting edema. Varicosities bilaterally. Vascular: 2+ radial, 1+ dorsalis pedis and posterior tibial pulses bilaterally. Brisk carotid upstrokes without carotid bruit. Skin: Skin color, texture, turgor are normal with no rashes or ulceration. Pysch: Euthymic mood, appropriate affect   Neurologic: Oriented to person, place and time. No slurred speech or facial asymmetry. No motor or sensory deficits on gross examination.            Labs:   CBC:   Lab Results   Component Value Date    WBC 7.2 03/10/2022    RBC 3.69 03/10/2022    HGB 11.4 03/10/2022    HCT 34.4 03/10/2022    MCV 93.2 03/10/2022    RDW 14.1 03/10/2022     03/10/2022     CMP:  Lab Results   Component Value Date     03/10/2022 K 4.3 03/10/2022    K 4.3 02/17/2022    CL 98 03/10/2022    CO2 23 03/10/2022    BUN 18 03/10/2022    CREATININE <0.5 03/10/2022    GFRAA >60 03/10/2022    GFRAA >60 05/30/2013    AGRATIO 2.2 03/10/2022    LABGLOM >60 03/10/2022    GLUCOSE 269 03/10/2022    PROT 6.3 03/10/2022    PROT 7.0 02/15/2012    CALCIUM 9.5 03/10/2022    BILITOT 2.1 03/10/2022    ALKPHOS 268 03/10/2022     03/10/2022     03/10/2022     PT/INR:  No results found for: PTINR  HgBA1c:  Lab Results   Component Value Date    LABA1C 6.1 06/02/2015     Lab Results   Component Value Date    CKTOTAL 89 06/02/2015    TROPONINI <0.01 08/17/2021     Lab Results   Component Value Date    CHOL 118 06/02/2015    CHOL 143 05/16/2014    CHOL 119 10/31/2013     Lab Results   Component Value Date    TRIG 146 06/02/2015    TRIG 165 (H) 05/16/2014    TRIG 115 10/31/2013     Lab Results   Component Value Date    HDL 42 06/02/2015    HDL 48 05/16/2014    HDL 36 (L) 10/31/2013     Lab Results   Component Value Date    LDLCALC 47 06/02/2015    LDLCALC 62 05/16/2014    LDLCALC 60 10/31/2013     Lab Results   Component Value Date    LABVLDL 29 06/02/2015    LABVLDL 33 05/16/2014    LABVLDL 23 10/31/2013     No results found for: CHOLHDLRATIO    Lab Results   Component Value Date    CHOL 118 06/02/2015    CHOL 143 05/16/2014    CHOL 119 10/31/2013     Lab Results   Component Value Date    TRIG 146 06/02/2015    TRIG 165 (H) 05/16/2014    TRIG 115 10/31/2013     Lab Results   Component Value Date    HDL 42 06/02/2015    HDL 48 05/16/2014    HDL 36 (L) 10/31/2013     Lab Results   Component Value Date    LDLCALC 47 06/02/2015    LDLCALC 62 05/16/2014    LDLCALC 60 10/31/2013     Lab Results   Component Value Date    LABVLDL 29 06/02/2015    LABVLDL 33 05/16/2014    LABVLDL 23 10/31/2013     No results found for: CHOLHDLRATIO  Cardiac Data:   EKG 02/17/2022  Normal sinus rhythm Possible Anterior infarct (cited on or before 14-NOV-2018)Abnormal ECG When compared with ECG of 17-AUG-2021 03:15,Sinus rhythm has replaced Atrial fibrillation Confirmed by Con Mane MD, [facility] ([id number]) on 2022 7:13:31 PM     ECHO 10/14/2021  Summary  Left ventricular systolic function is normal with ejection fraction estimated at 55%. No regional wall motion abnormalities. Normal left ventricular diastolic filling pressure. Patent foramen ovale with left-to-right shunt was visualized by color flow doppler. I suspect she has interatrial septal aneurysm best seen in apical 4 chamber view    [id number]  Atrial fibrillation with rapid ventricular responseSeptal infarct (cited on or before 2018)Abnormal ECGWhen compared with ECG of 16-AUG-2021 10:56,Atrial fibrillation has replaced Sinus rhythmVent. rate has increased BY  62 BPMNon-specific change in ST segment in Inferior leadsST now depressed in Lateral leadsConfirmed by Pan Garcia MD, [address] () on 2021 7:58:29 AM     Stress Echo: 2012  IMPRESSION:   Normal rest/stress echocardiogram.     Additional studies:   Carotid US 2020  Impression:        o No hemodynamically significant lesions are identified in the right and        left internal or common carotid arteries. Bilateral antegrade vertebral        flow. o Thyroid nodules noted. Impression and Plan:      1. Atrial fibrillation, paroxysmal-maintaining sinus by exam today  2. History of syncope, suspect in the setting of dehydration with poor p.o. intake and infection, without recurrence  3. History of COVID-19 pneumonia  4. Hypertension  5. Pancytopenia in setting of viral illness, tolerating blood thinner  6. Hyperlipidemia  7. Diabetes mellitus type 2  8. Pancreatic mass, suspected malignancy, PET avid, insufficient cells/inconclusive pathology. Due to see surgery this month.       [id number] Score for Atrial Fibrillation Stroke Risk   Risk   Factors  Component Value   C CHF No 0   H HTN Yes 1   A2 Age >= 76 Yes,  [de-identified] y.o.) 2   D DM Yes 1   S2 Prior Stroke/TIA No 0   V Vascular Disease No 0   A Age 74-69 No,  [de-identified] y.o.) 0   Sc Sex female 1    [id number]  Score  5   Score last updated 9/22/21 77:11 AM EDT    Click here for a link to the UpToDate guideline \"Atrial Fibrillation: Anticoagulation therapy to prevent embolization    Disclaimer: Risk Score calculation is dependent on accuracy of patient problem list and past encounter diagnosis. Patient Active Problem List   Diagnosis    Diabetes mellitus without complication (HCC)    Hypertension    Hyperlipidemia    Post-menopausal bleeding    Endometrioid adenocarcinoma    Acid reflux    History of uterine cancer    Osteopenia    Closed displaced fracture of lateral malleolus of right fibula    Right ankle pain    Right carpal tunnel syndrome    Foot callus    Hammer toe of left foot    Acute pain of left knee    Syncope and collapse    Atrial fibrillation with RVR (HCC)    COVID-19    History of UTI    History of COVID-19         PLAN:  1. Continue metoprolol twice daily at present dosing. 2.  Continue Eliquis twice daily. This medication may be x 48 hrs prior to any planned surgery. Low risk MACE. Plan to follow up in six months. This note is scribed in the presence of Parth Fernandez by Angela Palmer RN    The scribes documentation has been prepared under my direction and personally reviewed by me in its entirety. I confirm that the note above accurately reflects all work, treatment, procedures, and medical decision making performed by me. Charisma Fernandez MD, personally performed the services described in this documentation as scribed by Angela Palmer RN in my presence, and it is both accurate and complete to the best of our ability. I will address the patient's cardiac risk factors and adjusted pharmacologic treatment as needed. In addition, I have reinforced the need for patient directed risk factor modification.   All questions and concerns were addressed to the patient/family. Alternatives to my treatment were discussed. Thank you for allowing us to participate in the care of Pallavi Friday. Please call me with any questions [phone].     Micky Diane MD, [facility]  Cardiovascular Disease  [address]  [phone] [facility]  [phone] [address] [place] [address]  5/2/2022 12:40 PM

## 2022-05-02 ENCOUNTER — OFFICE VISIT (OUTPATIENT)
Dept: CARDIOLOGY CLINIC | Age: 82
End: 2022-05-02
Payer: MEDICARE

## 2022-05-02 VITALS
HEART RATE: 76 BPM | TEMPERATURE: 98.3 F | DIASTOLIC BLOOD PRESSURE: 66 MMHG | BODY MASS INDEX: 21.6 KG/M2 | WEIGHT: 117.4 LBS | SYSTOLIC BLOOD PRESSURE: 140 MMHG | HEIGHT: 62 IN | OXYGEN SATURATION: 97 %

## 2022-05-02 DIAGNOSIS — E78.5 HYPERLIPIDEMIA, UNSPECIFIED HYPERLIPIDEMIA TYPE: ICD-10-CM

## 2022-05-02 DIAGNOSIS — I10 HYPERTENSION, UNSPECIFIED TYPE: Primary | ICD-10-CM

## 2022-05-02 DIAGNOSIS — I48.91 ATRIAL FIBRILLATION WITH RVR (HCC): ICD-10-CM

## 2022-05-02 PROCEDURE — 99214 OFFICE O/P EST MOD 30 MIN: CPT | Performed by: INTERNAL MEDICINE

## 2022-05-02 NOTE — PATIENT INSTRUCTIONS
PLAN:  1. Call us if you need surgery and will need a letter about holding blood thinner. 2. Continue current medications. Plan to follow up in six months.

## 2022-06-06 ENCOUNTER — TELEPHONE (OUTPATIENT)
Dept: CARDIOLOGY CLINIC | Age: 82
End: 2022-06-06

## 2022-06-06 NOTE — TELEPHONE ENCOUNTER
CARDIAC CLEARANCE REQUEST    What type of procedure are you having:  whipple  Are you taking any blood thinners:  Eliquis 5mg- surgeon wants to pt to hold 3 days prior  Type on anesthesia:  general  When is your procedure scheduled for:  06/21/2022  What physician is performing your procedure:  Dr. Lakeisha López  Phone Number:  [phone]  Fax number to send the letter:  [phone]    Last ov 05/02/2022 conrad

## 2022-08-30 ENCOUNTER — HOSPITAL ENCOUNTER (OUTPATIENT)
Dept: MAMMOGRAPHY | Age: 82
Discharge: HOME | End: 2022-08-30
Payer: MEDICARE

## 2022-08-30 DIAGNOSIS — Z12.39 SCREENING BREAST EXAMINATION: ICD-10-CM

## 2022-08-30 PROCEDURE — 77063 BREAST TOMOSYNTHESIS BI: CPT

## 2022-08-31 ENCOUNTER — TELEPHONE (OUTPATIENT)
Dept: MAMMOGRAPHY | Age: 82
End: 2022-08-31

## 2022-09-22 RX ORDER — APIXABAN 5 MG/1
TABLET, FILM COATED ORAL
Qty: 180 TABLET | Refills: 2 | Status: SHIPPED | OUTPATIENT
Start: 2022-09-22 | End: 2022-10-17 | Stop reason: SDUPTHER

## 2022-10-17 NOTE — TELEPHONE ENCOUNTER
Patient called requesting samples of Eliquis 5 mg BID to hold her over until her OV with NOEL on 11/8/2022 as she is in [facility]. I gave Eliquis 5 mg #14 and 2.5 mg #56.

## 2022-11-03 NOTE — PROGRESS NOTES
CARDIOLOGY FOLLOW UP        Patient Name: Carlee Gross  Primary Care physician: Alfred Torres MD    Reason for Referral/Chief Complaint: Carlee Gross is a [de-identified] y.o. patient who presents today for treatment of atrial fibrillation diagnosed during  admission 8/16-8/18/21 with COVID-19. History of Present Illness:   Carlee Gross is an [de-identified] y.o. patient with prior history notable for hyperlipidemia, hypertension, diabetes type 2, COVID-19 9/2021, pancreatic mass, who presented to the hospital 8/16/2021 with complaints of generalized weakness and syncopal episode. On admission on the floor patient was noted to have a brief episode of atrial fibrillation with rapid ventricular rates lasting approximately 2 hours in the early a.m. EKG showed atrial fibrillation with heart rate 141 bpm and nonspecific ST abnormality. 10/14/21 Echo with EF 55%, interatrial septal aneurysm, mild thick anterior leaflet of mitral valve. Trivial mild regurg. Aortic valve mild thick. Inadequate tricusp regurg. On 02/17/2022 she underwent endoscopic retrograde cholangio pancreatogram.  She had biopsy taken for known pancreatic mass which returned atypical cells but inconclusive ultimately. She went on to have PET CT on 04/14/2022 revealing hypermetabolic pancreatic head mass most compatible with malignancy. Findings are associated with pancreatic atrophy, pancreatic ductal dilatation, and biliary dilatation. No hypermetabolic metastatic disease in the chest, abdomen and pelvis. 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 Today, she reports doing well. She reports that she was laid up for 4 days for prior whipple procedure, but overall recovered with uneventful course. She has restarted Eliquis since procedure. Notes this is quite expensive for her as she is presently in the donut hole.   Denies palpitations, dizziness or presyncope. She did have a fall at the grocery with significant bruising of her left side but no head trauma. She denies any evidence of hematemesis, hemoptysis, melena, hematochezia or hematuria with blood thinner. Denies abdominal pain. Denies nausea or vomiting. Does states she is having lots of loose stools. The patient denies chest pain, shortness of breath at rest and dyspnea with exertion. Denies paroxysmal nocturnal dyspnea, orthopnea, increasing lower extremity edema or rapid weight gain. Does note her blood pressure was running high at home, 558C systolic. She stopped checking. Past Medical History:   has a past medical history of Arthritis of left knee, COVID-19, Endometrioid adenocarcinoma, GERD (gastroesophageal reflux disease), Hyperlipidemia, Hypertension, Nausea, PAF (paroxysmal atrial fibrillation) ([facility] [address] Pancreas divisum, Right carpal tunnel syndrome, and Type II or unspecified type diabetes mellitus without mention of complication, not stated as uncontrolled. Surgical History:   has a past surgical history that includes Cholecystectomy; Bladder surgery; Carpal tunnel release; Total abdominal hysterectomy w/ bilateral salpingoophorectomy (2011); Carpal tunnel release (Right); Breast biopsy; Hysterectomy; ERCP (N/A, 2/17/2022); other surgical history (03/09/2022); and Upper gastrointestinal endoscopy (N/A, 3/9/2022). Social History:   reports that she has never smoked. She has never used smokeless tobacco. She reports that she does not drink alcohol and does not use drugs. Family History:  family history includes Diabetes in her brother and mother; Heart Disease in her mother; High Blood Pressure in her mother; Stroke in her father. Home Medications:  Were reviewed and are listed in nursing record and/or below  Prior to Admission medications    Medication Sig Start Date End Date Taking?  Authorizing Provider   apixaban (ELIQUIS) 5 MG TABS tablet Take 1 tablet by mouth 2 times daily 10/17/22  Yes Ashwin Rivas MD   metoprolol tartrate (LOPRESSOR) 25 MG tablet TAKE 1 TABLET BY MOUTH TWICE A DAY 10/4/22  Yes Ashwin Rivas MD   omeprazole (PRILOSEC) 40 MG delayed release capsule Take 40 mg by mouth 2 times daily   Yes Historical Provider, MD   acyclovir (ZOVIRAX) 800 MG tablet Take 400 mg by mouth 2 times daily    Yes Historical Provider, MD   Coenzyme Q10 (COQ10) 100 MG CAPS Take 1 capsule by mouth daily   Yes Historical Provider, MD   vitamin D (CHOLECALCIFEROL) 1000 UNIT TABS tablet Take 1,000 Units by mouth every other day    Yes Historical Provider, MD   oxybutynin (DITROPAN) 5 MG tablet TAKE 1 TABLET BY MOUTH 2 TIMES DAILY 5/2/17  Yes SARA Coats CNP   Omega-3 Fatty Acids (OMEGA-3 FISH OIL) 1000 MG CAPS Take 1 capsule by mouth 6/11/16  Yes Historical Provider, MD   Misc Natural Products (OSTEO BI-FLEX ADV DOUBLE ST PO) Take 1 tablet by mouth daily    Yes Historical Provider, MD   metFORMIN (GLUCOPHAGE) 500 MG tablet Take 1 tablet by mouth 2 times daily (with meals) 6/2/15  Yes Kulwinder Drew MD   atorvastatin (LIPITOR) 10 MG tablet Take 1 tablet by mouth daily 6/2/15  Yes Kulwinder Drew MD   fluticasone (FLONASE) 50 MCG/ACT nasal spray 2 sprays by Nasal route daily  Patient taking differently: 2 sprays by Nasal route daily as needed 6/2/15  Yes Kulwinder Drew MD   Multiple Vitamins-Minerals (PRESERVISION AREDS 2 PO) Take by mouth daily    Historical Provider, MD   apixaban (ELIQUIS) 2.5 MG TABS tablet Take 2 tablets by mouth 2 times daily  Patient not taking: Reported on 11/8/2022 10/17/22   Ashwin Rivas MD   calcium carbonate (OSCAL) 500 MG TABS tablet Take 500 mg by mouth daily  Patient not taking: Reported on 11/8/2022    Historical Provider, MD   Probiotic Product (PROBIOTIC ADVANCED PO) Take 1 tablet by mouth daily   Patient not taking: Reported on 11/8/2022    Historical Provider, MD        CURRENT Medications:  No current facility-administered medications for this visit. Allergies:  Sulfa antibiotics, Dicyclomine hcl, Diclofenac, Lisinopril, Penicillins, and Sulfamethoxazole-trimethoprim [bactrim]     Review of Systems:   A 14 point review of symptoms completed. Pertinent positives identified in the HPI, all other review of symptoms negative as below. Objective:     Vitals:    11/08/22 1304 11/08/22 1314   BP: (!) 180/80 (!) 180/80   Pulse: 73 73   SpO2: 98% 98%   Weight: 107 lb (48.5 kg) 107 lb (48.5 kg)   Height: 5' 2\" (1.575 m) 5' 2\" (1.575 m)         Wt Readings from Last 3 Encounters:   11/08/22 107 lb (48.5 kg)   05/02/22 117 lb 6.4 oz (53.3 kg)   03/09/22 117 lb (53.1 kg)      Weight: 107 lb (48.5 kg)         PHYSICAL EXAM:    General:  Pleasant woman, gaunt appearing, no acute distress   Head:  Normocephalic, atraumatic   Eyes:  Conjunctiva/corneas clear, anicteric sclerae    Nose: Nares normal, no drainage or sinus tenderness   Throat: No abnormalities of the lips, oral mucosa or tongue. Moist mucous membranes. Neck: Trachea midline. Neck supple with no lymphadenopathy, thyroid not enlarged, symmetric, no tenderness/mass/nodules   Lungs:   Clear to auscultation bilaterally, no wheezes, no rales, no respiratory distress   Chest Wall:  No deformity or tenderness to palpation   Heart:  Regular rate and rhythm, normal S1, normal S2, no murmur, no rub, no S3/S4, PMI non-displaced. Abdomen:   Soft, non-tender, incisions C/D/I, with normoactive bowel sounds. No masses, no hepatosplenomegaly   Extremities: No cyanosis, clubbing. No pitting edema. Compression stockings in place bilaterally. Vascular: 2+ radial, 1+ dorsalis pedis and posterior tibial pulses bilaterally. Brisk carotid upstrokes without carotid bruit. Skin: Skin color, texture, turgor are normal with no rashes or ulceration. Pysch: Euthymic mood, appropriate affect   Neurologic: Oriented to person, place and time. No slurred speech or facial asymmetry.  No motor or sensory deficits on gross examination.            Labs:   CBC:   Lab Results   Component Value Date/Time    WBC 7.2 03/10/2022 01:41 PM    RBC 3.69 03/10/2022 01:41 PM    HGB 11.4 03/10/2022 01:41 PM    HCT 34.4 03/10/2022 01:41 PM    MCV 93.2 03/10/2022 01:41 PM    RDW 14.1 03/10/2022 01:41 PM     03/10/2022 01:41 PM     CMP:  Lab Results   Component Value Date/Time     03/10/2022 01:41 PM    K 4.3 03/10/2022 01:41 PM    K 4.3 02/17/2022 10:05 AM    CL 98 03/10/2022 01:41 PM    CO2 23 03/10/2022 01:41 PM    BUN 18 03/10/2022 01:41 PM    CREATININE <0.5 03/10/2022 01:41 PM    GFRAA >60 03/10/2022 01:41 PM    GFRAA >60 05/30/2013 08:22 AM    AGRATIO 2.2 03/10/2022 01:41 PM    LABGLOM >60 03/10/2022 01:41 PM    GLUCOSE 269 03/10/2022 01:41 PM    PROT 6.3 03/10/2022 01:41 PM    PROT 7.0 02/15/2012 06:10 PM    CALCIUM 9.5 03/10/2022 01:41 PM    BILITOT 2.1 03/10/2022 01:41 PM    ALKPHOS 268 03/10/2022 01:41 PM     03/10/2022 01:41 PM     03/10/2022 01:41 PM     PT/INR:  No results found for: PTINR  HgBA1c:  Lab Results   Component Value Date    LABA1C 6.1 06/02/2015     Lab Results   Component Value Date    CKTOTAL 89 06/02/2015    TROPONINI <0.01 08/17/2021     Lab Results   Component Value Date    CHOL 118 06/02/2015    CHOL 143 05/16/2014    CHOL 119 10/31/2013     Lab Results   Component Value Date    TRIG 146 06/02/2015    TRIG 165 (H) 05/16/2014    TRIG 115 10/31/2013     Lab Results   Component Value Date    HDL 42 06/02/2015    HDL 48 05/16/2014    HDL 36 (L) 10/31/2013     Lab Results   Component Value Date    LDLCALC 47 06/02/2015    LDLCALC 62 05/16/2014    LDLCALC 60 10/31/2013     Lab Results   Component Value Date    LABVLDL 29 06/02/2015    LABVLDL 33 05/16/2014    LABVLDL 23 10/31/2013     No results found for: [facility]    Lab Results   Component Value Date    CHOL 118 06/02/2015    CHOL 143 05/16/2014    CHOL 119 10/31/2013     Lab Results   Component Value Date    TRIG 146 2015    TRIG 165 (H) 2014    TRIG 115 10/31/2013     Lab Results   Component Value Date    HDL 42 2015    HDL 48 2014    HDL 36 (L) 10/31/2013     Lab Results   Component Value Date    LDLCALC 47 2015    LDLCALC 62 2014    LDLCALC 60 10/31/2013     Lab Results   Component Value Date    LABVLDL 29 2015    LABVLDL 33 2014    LABVLDL 23 10/31/2013     No results found for: CHOLHDLRATIO  Cardiac Data:   EKG 2022  Normal sinus rhythm Possible Anterior infarct (cited on or before 2018)Abnormal ECG When compared with ECG of 17-AUG-2021 03:15,Sinus rhythm has replaced Atrial fibrillation Confirmed by Padma Norris MD, [facility] ([id number]) on 2022 7:13:31 PM     ECHO 10/14/2021  Summary  Left ventricular systolic function is normal with ejection fraction estimated at 55%. No regional wall motion abnormalities. Normal left ventricular diastolic filling pressure. Patent foramen ovale with left-to-right shunt was visualized by color flow doppler. I suspect she has interatrial septal aneurysm best seen in apical 4 chamber view    [id number]  Atrial fibrillation with rapid ventricular responseSeptal infarct (cited on or before 2018)Abnormal ECGWhen compared with ECG of 16-AUG-2021 10:56,Atrial fibrillation has replaced Sinus rhythmVent. rate has increased BY  62 BPMNon-specific change in ST segment in Inferior leadsST now depressed in Lateral leadsConfirmed by Jackson Slater MD, [address] () on 2021 7:58:29 AM     Stress Echo: 2012  IMPRESSION:   Normal rest/stress echocardiogram.     Additional studies:   Carotid US 2020  Impression:        o No hemodynamically significant lesions are identified in the right and        left internal or common carotid arteries. Bilateral antegrade vertebral        flow. o Thyroid nodules noted.         Impression and Plan:      Atrial fibrillation, paroxysmal-maintaining sinus over serial visits  History of syncope, suspect in the setting of dehydration with poor p.o. intake and infection, without recurrence  Hypertension-severely elevated today in the office  Hyperlipidemia  Diabetes mellitus type 2  Pancreatic mass, suspected malignancy, PET avid, insufficient cells/inconclusive pathology. Due to see surgery this month. History of COVID-19 pneumonia    [id number] Score for Atrial Fibrillation Stroke Risk   Risk   Factors  Component Value   C CHF No 0   H HTN Yes 1   A2 Age >= 76 Yes,  [de-identified] y.o.) 2   D DM Yes 1   S2 Prior Stroke/TIA No 0   V Vascular Disease No 0   A Age 74-69 No,  [de-identified] y.o.) 0   Sc Sex female 1    [id number]  Score  5   Score last updated 9/22/21 71:03 AM EDT    Click here for a link to the UpToDate guideline \"Atrial Fibrillation: Anticoagulation therapy to prevent embolization    Disclaimer: Risk Score calculation is dependent on accuracy of patient problem list and past encounter diagnosis. Patient Active Problem List   Diagnosis    Diabetes mellitus without complication (HCC)    Hypertension    Hyperlipidemia    Post-menopausal bleeding    Endometrioid adenocarcinoma    Acid reflux    History of uterine cancer    Osteopenia    Closed displaced fracture of lateral malleolus of right fibula    Right ankle pain    Right carpal tunnel syndrome    Foot callus    Hammer toe of left foot    Acute pain of left knee    Syncope and collapse    Atrial fibrillation with RVR ([address]    COVID-19    History of UTI    History of COVID-19         PLAN:  1. Given weight loss following surgery, less than 61kg, [de-identified]years old, will change to Eliquis 2.5 mg twice daily  2. Continue metoprolol tartrate twice daily  3. Add amlodipine 5 mg once daily for blood pressure; previous allergy to lisinopril. Avoiding diuretics with frequent loose stools.   4.  Low-salt diet and compression stockings for management of edema    Plan to follow up in 4 weeks with LR NP for BP check and 6 months With me    This note was scribed in the presence of Chhaya Davis MD by Tori Caballero RN. The scribes documentation has been prepared under my direction and personally reviewed by me in its entirety. I confirm that the note above accurately reflects all work, treatment, procedures, and medical decision making performed by me. Nayeli Otero MD, personally performed the services described in this documentation as scribed by Tori Caballero RN. in my presence, and it is both accurate and complete to the best of our ability. I will address the patient's cardiac risk factors and adjusted pharmacologic treatment as needed. In addition, I have reinforced the need for patient directed risk factor modification. All questions and concerns were addressed to the patient/family. Alternatives to my treatment were discussed. Thank you for allowing us to participate in the care of Saleem Guerra. Please call me with any questions [phone].     Sarah Burleson MD, [facility] [place]  Cardiovascular Disease  [facility]  [phone] [facility]  [phone] [address] [place] [address]  11/8/2022 1:24 PM sick contacts

## 2022-11-08 ENCOUNTER — OFFICE VISIT (OUTPATIENT)
Dept: CARDIOLOGY CLINIC | Age: 82
End: 2022-11-08
Payer: MEDICARE

## 2022-11-08 VITALS
OXYGEN SATURATION: 98 % | HEIGHT: 62 IN | WEIGHT: 107 LBS | DIASTOLIC BLOOD PRESSURE: 80 MMHG | SYSTOLIC BLOOD PRESSURE: 180 MMHG | HEART RATE: 73 BPM | BODY MASS INDEX: 19.69 KG/M2

## 2022-11-08 DIAGNOSIS — I48.91 ATRIAL FIBRILLATION WITH RVR (HCC): ICD-10-CM

## 2022-11-08 DIAGNOSIS — I10 HYPERTENSION, UNSPECIFIED TYPE: Primary | ICD-10-CM

## 2022-11-08 DIAGNOSIS — E78.5 HYPERLIPIDEMIA, UNSPECIFIED HYPERLIPIDEMIA TYPE: ICD-10-CM

## 2022-11-08 PROCEDURE — 99214 OFFICE O/P EST MOD 30 MIN: CPT | Performed by: INTERNAL MEDICINE

## 2022-11-08 PROCEDURE — 3074F SYST BP LT 130 MM HG: CPT | Performed by: INTERNAL MEDICINE

## 2022-11-08 PROCEDURE — 1123F ACP DISCUSS/DSCN MKR DOCD: CPT | Performed by: INTERNAL MEDICINE

## 2022-11-08 PROCEDURE — 3078F DIAST BP <80 MM HG: CPT | Performed by: INTERNAL MEDICINE

## 2022-11-08 RX ORDER — AMLODIPINE BESYLATE 5 MG/1
5 TABLET ORAL DAILY
Qty: 30 TABLET | Refills: 5 | Status: SHIPPED | OUTPATIENT
Start: 2022-11-08

## 2022-11-08 NOTE — LETTER
[address]  [address] [address]  Phone: [phone]  Fax: [phone]    Jeevan Best MD    November 9, 2022     Nadia Dhillon, [address] [facility]    Patient: Jen Wasserman   MR Number: [id number]   YOB: 1940   Date of Visit: 11/8/2022       Dear Nadia Dhillon: Thank you for referring Phyliss Roots to me for evaluation/treatment. Below are the relevant portions of my assessment and plan of care. If you have questions, please do not hesitate to call me. I look forward to following Jasmyne Hsieh along with you.     Sincerely,      Jeevan Best MD

## 2022-11-08 NOTE — PATIENT INSTRUCTIONS
PLAN:  1. Medications reviewed  2. Recommend that you go on Eliquis 2.5 mg for stroke prevention as afib can increase this risk and you also have increased risk for blood clots due to recent cancer. 3.  Limit salt intake to less than 2 grams per day. 4.  Keep feet elevated when sitting down. 5.  Wear compression stockings. 6.  Add Amlodipine 5 mg daily  Plan to follow up in 6 months. LR NP in 4 weeks.

## 2022-12-05 RX ORDER — AMLODIPINE BESYLATE 5 MG/1
TABLET ORAL
Qty: 30 TABLET | Refills: 5 | Status: SHIPPED | OUTPATIENT
Start: 2022-12-05

## 2022-12-13 ENCOUNTER — OFFICE VISIT (OUTPATIENT)
Dept: CARDIOLOGY CLINIC | Age: 82
End: 2022-12-13
Payer: MEDICARE

## 2022-12-13 VITALS
DIASTOLIC BLOOD PRESSURE: 72 MMHG | HEIGHT: 62 IN | HEART RATE: 70 BPM | SYSTOLIC BLOOD PRESSURE: 138 MMHG | BODY MASS INDEX: 19.51 KG/M2 | OXYGEN SATURATION: 98 % | WEIGHT: 106 LBS

## 2022-12-13 DIAGNOSIS — I48.0 PAF (PAROXYSMAL ATRIAL FIBRILLATION) (HCC): ICD-10-CM

## 2022-12-13 DIAGNOSIS — I10 PRIMARY HYPERTENSION: Primary | ICD-10-CM

## 2022-12-13 PROCEDURE — 3078F DIAST BP <80 MM HG: CPT | Performed by: NURSE PRACTITIONER

## 2022-12-13 PROCEDURE — 1123F ACP DISCUSS/DSCN MKR DOCD: CPT | Performed by: NURSE PRACTITIONER

## 2022-12-13 PROCEDURE — 99214 OFFICE O/P EST MOD 30 MIN: CPT | Performed by: NURSE PRACTITIONER

## 2022-12-13 PROCEDURE — 3074F SYST BP LT 130 MM HG: CPT | Performed by: NURSE PRACTITIONER

## 2022-12-13 RX ORDER — VITAMIN B COMPLEX
1 CAPSULE ORAL DAILY
COMMUNITY

## 2022-12-13 NOTE — LETTER
[address]  [address] [address]  Phone: [phone]  Fax: Mukul Cabrera, APRN - [place]    December 14, 2022     Keny Moser, [address] [facility]    Patient: Farrukh Gallagher   MR Number: [id number]   YOB: 1940   Date of Visit: 12/13/2022       Dear Keny Moser: Thank you for referring Marilu Leal to me for evaluation/treatment. Below are the relevant portions of my assessment and plan of care. If you have questions, please do not hesitate to call me. I look forward to following Rae Benedict along with you.     Sincerely,      SARA Galdamez CNP

## 2022-12-13 NOTE — PATIENT INSTRUCTIONS
Everything looks great today, good job!   Check BP at home and call the office if consistently out of goal range; if consistently >140/90 then call the office  Continue current medications  Follow up as planned with Dr. Scarlett Gary

## 2022-12-13 NOTE — PROGRESS NOTES
[address]   Cardiology Note              Date:  December 13, 2022  Patientname: Pham Cruz  YOB: 1940    Primary Care physician: Marshall Wasserman MD    HISTORY OF PRESENT ILLNESS: Pham Cruz is a [de-identified] y.o. female with a history of afib, HTN, HLD, pancreatic mass. She presented 8/2021 for weakness and syncope. COVID+. During admission found to have PAF. Echo showed EF 55%, interatrial septal aneurysm. At office visit 11/2022, BP elevated and amlodipine added. Today she presents for follow up for HTN. She feels good and BP has improved. Home BP 130s/60s. She denies chest pain, shortness of breath, palpitations and dizziness. She stopped eliquis due to cost/being in the donut hole. Cardiologist: Dr. Concetta Godinez 11/2022    Past Medical History:   has a past medical history of Arthritis of left knee, COVID-19, Endometrioid adenocarcinoma, GERD (gastroesophageal reflux disease), Hyperlipidemia, Hypertension, Nausea, PAF (paroxysmal atrial fibrillation) ([facility] [address] Pancreas divisum, Right carpal tunnel syndrome, and Type II or unspecified type diabetes mellitus without mention of complication, not stated as uncontrolled. Past Surgical History:   has a past surgical history that includes Cholecystectomy; Bladder surgery; Carpal tunnel release; Total abdominal hysterectomy w/ bilateral salpingoophorectomy (2011); Carpal tunnel release (Right); Breast biopsy; Hysterectomy; ERCP (N/A, 2/17/2022); other surgical history (03/09/2022); and Upper gastrointestinal endoscopy (N/A, 3/9/2022). Home Medications:    Prior to Admission medications    Medication Sig Start Date End Date Taking?  Authorizing Provider   amLODIPine (NORVASC) 5 MG tablet TAKE 1 TABLET BY MOUTH EVERY DAY 12/5/22  Yes Ling Warner MD   Multiple Vitamins-Minerals (PRESERVISION AREDS 2 PO) Take by mouth daily Takes 2 tablets qd   Yes Historical Provider, MD   metoprolol tartrate (LOPRESSOR) 25 MG tablet TAKE 1 TABLET BY MOUTH TWICE A DAY 10/4/22  Yes Cristy Villanueva MD   omeprazole (PRILOSEC) 40 MG delayed release capsule Take 40 mg by mouth daily   Yes Historical Provider, MD   acyclovir (ZOVIRAX) 800 MG tablet Take 400 mg by mouth 2 times daily    Yes Historical Provider, MD   Coenzyme Q10 (COQ10) 100 MG CAPS Take 1 capsule by mouth daily   Yes Historical Provider, MD   calcium carbonate (OSCAL) 500 MG TABS tablet Take 500 mg by mouth daily   Yes Historical Provider, MD   vitamin D (CHOLECALCIFEROL) 1000 UNIT TABS tablet Take 1,000 Units by mouth every other day    Yes Historical Provider, MD   oxybutynin (DITROPAN) 5 MG tablet TAKE 1 TABLET BY MOUTH 2 TIMES DAILY 5/2/17  Yes SARA Angulo CNP   Omega-3 Fatty Acids (OMEGA-3 FISH OIL) 1000 MG CAPS Take 1 capsule by mouth 6/11/16  Yes Historical Provider, MD   Misc Natural Products (OSTEO BI-FLEX ADV DOUBLE ST PO) Take 1 tablet by mouth daily    Yes Historical Provider, MD   metFORMIN (GLUCOPHAGE) 500 MG tablet Take 1 tablet by mouth 2 times daily (with meals) 6/2/15  Yes Chari Rios MD   atorvastatin (LIPITOR) 10 MG tablet Take 1 tablet by mouth daily 6/2/15  Yes Chari Rios MD   fluticasone (FLONASE) 50 MCG/ACT nasal spray 2 sprays by Nasal route daily  Patient taking differently: 2 sprays by Nasal route daily as needed 6/2/15  Yes Chari Rios MD   apixaban (ELIQUIS) 2.5 MG TABS tablet Take 1 tablet by mouth 2 times daily  Patient not taking: Reported on 12/13/2022 11/8/22   Cristy Villanueva MD   Probiotic Product (PROBIOTIC ADVANCED PO) Take 1 tablet by mouth daily   Patient not taking: No sig reported    Historical Provider, MD     Allergies:  Sulfa antibiotics, Dicyclomine hcl, Diclofenac, Lisinopril, Penicillins, and Sulfamethoxazole-trimethoprim [bactrim]    Social History:   reports that she has never smoked. She has never used smokeless tobacco. She reports that she does not drink alcohol and does not use drugs.      Family History: family history includes Diabetes in her brother and mother; Heart Disease in her mother; High Blood Pressure in her mother; Stroke in her father. Review of Systems   Review of Systems    OBJECTIVE:    Vital signs:    /72   Pulse 70   Ht 5' 2\" (1.575 m)   Wt 106 lb (48.1 kg)   LMP 06/12/1991 (Approximate)   SpO2 98%   BMI 19.39 kg/m²      Physical Exam:  Constitutional:  Comfortable and alert, NAD, appears stated age  Eyes: PERRL, sclera nonicteric  Neck:  Supple, no masses, no thyroidmegaly, no JVD  Skin:  Warm and dry; no rash or lesions  Heart:  Regular, normal apex, S1 and S2 normal, no M/G/R  Lungs:  Normal respiratory effort; clear; no wheezing/rhonchi/rales  Abdomen: soft, non tender, + bowel sounds  Extremities:  No edema or cyanosis; no clubbing  Neuro: alert and oriented, moves legs and arms equally, normal mood and affect    Data Reviewed:      Echo 10/2021:  Left ventricular systolic function is normal with ejection fraction estimated at 55%. No regional wall motion abnormalities. Normal left ventricular diastolic filling pressure. Patent foramen ovale with left-to-right shunt was visualized by color flow doppler. I suspect she has interatrial septal aneurysm best seen in apical 4 chamber view    Cardiology Labs Reviewed:   CBC: No results for input(s): WBC, HGB, HCT, PLT in the last 72 hours. BMP:No results for input(s): NA, K, CO2, BUN, CREATININE, LABGLOM, GLUCOSE in the last 72 hours. PT/INR: No results for input(s): PROTIME, INR in the last 72 hours. APTT:No results for input(s): APTT in the last 72 hours. FASTING LIPID PANEL:  Lab Results   Component Value Date/Time    HDL 42 06/02/2015 09:24 AM    HDL 30 05/14/2012 10:38 AM    LDLCALC 47 06/02/2015 09:24 AM    TRIG 146 06/02/2015 09:24 AM     LIVER PROFILE:No results for input(s): AST, ALT, ALB in the last 72 hours.   BNP: No results found for: PROBNP    Reviewed all labs and imaging today    Assessment:   HTN: improved  Paroxysmal atrial fibrillation: stable, regular today   - [id number] score >2 on eliquis   - diagnosed 8/2021  HLD: controlled, LDL 32  DM: hgb a1c 7.8  Pancreatic mass    Plan:   1. Continue amlodipine, metoprolol, statin, eliquis (samples provided)  2. Check BP at home and call the office if consistently out of goal range  3. Stay active along with a healthy diet  4.  Follow up in 6 months with Dr. Km Hernandez, APRN-CNP  [facility]  [phone]

## 2023-05-16 ENCOUNTER — OFFICE VISIT (OUTPATIENT)
Dept: CARDIOLOGY CLINIC | Age: 83
End: 2023-05-16
Payer: MEDICARE

## 2023-05-16 VITALS
BODY MASS INDEX: 20.98 KG/M2 | HEART RATE: 69 BPM | HEIGHT: 62 IN | WEIGHT: 114 LBS | DIASTOLIC BLOOD PRESSURE: 88 MMHG | SYSTOLIC BLOOD PRESSURE: 155 MMHG

## 2023-05-16 DIAGNOSIS — I48.0 PAROXYSMAL ATRIAL FIBRILLATION (HCC): Primary | ICD-10-CM

## 2023-05-16 DIAGNOSIS — I10 ESSENTIAL HYPERTENSION: ICD-10-CM

## 2023-05-16 PROCEDURE — 1123F ACP DISCUSS/DSCN MKR DOCD: CPT | Performed by: INTERNAL MEDICINE

## 2023-05-16 PROCEDURE — 99214 OFFICE O/P EST MOD 30 MIN: CPT | Performed by: INTERNAL MEDICINE

## 2023-05-16 PROCEDURE — 3078F DIAST BP <80 MM HG: CPT | Performed by: INTERNAL MEDICINE

## 2023-05-16 PROCEDURE — 3077F SYST BP >= 140 MM HG: CPT | Performed by: INTERNAL MEDICINE

## 2023-05-16 NOTE — PATIENT INSTRUCTIONS
PLAN:  1.  Continue taking the Eliquis, amlodipine, metoprolol and atorvastatin as well as the rest of your medications as prescribed.  2.  No cardiac testing warranted at this time  3.  Continue to monitor your blood pressure at home.  Blood pressure goal is 130/80 or less.  Call our office with your numbers in 2 weeks.   If we need to adjust your amlodipine, we will do so at that time.

## 2023-05-16 NOTE — PROGRESS NOTES
CARDIOLOGY FOLLOW UP        Patient Name: Irvin Griffiths  Primary Care physician: Tiana Wan MD    Reason for Referral/Chief Complaint: Irvin Griffiths is a 82 y.o. patient who presents today for cardiology follow up hypertension. History of Present Illness:   Irvin Griffiths is an 82 y.o. patient with prior history notable for hyperlipidemia, hypertension, diabetes type 2, and pancreatic adenocarcinoma status post Whipple procedure. Patient presented to the hospital 8/16/2021 with complaints of generalized weakness and syncopal episode. On admission on the floor patient was noted to have a brief episode of atrial fibrillation with rapid ventricular rates lasting approximately 2 hours in the early a.m. EKG showed atrial fibrillation with heart rate 141 bpm and nonspecific ST abnormality. 10/14/21 Echo with EF 55%, interatrial septal aneurysm, mild thick anterior leaflet of mitral valve. Trivial mild regurg. Aortic valve mild thick. Inadequate tricusp regurg. 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 LOV 11/8/22 reported doing well. Restarted Eliquis since procedure. Did note quite expensive. Today she presents with her . She reports feeling ok. She states she weighed 111 lbs this AM at home. Mild wt gain since restarting Creon. She goes every 6 months to have her cancer monitored. Declined adjuvant chemo. Reports stable at this time. Denies abdominal pain. Denies chest pain, palpitations, dizziness, near-syncope or syncope. States she fell last Wednesday. Mechanical fall- States her foot got caught on something and she went down. No injury. Denies paroxysmal nocturnal dyspnea, orthopnea,weight gain. She has stable dependent edema, worse at the end of the day after she has been on her feet. States her blood pressure at home runs 693'I and diastolic 57'C. Believes she has whitecoat hypertension.     She

## 2023-05-30 ENCOUNTER — TELEPHONE (OUTPATIENT)
Dept: CARDIOLOGY CLINIC | Age: 83
End: 2023-05-30

## 2023-05-30 NOTE — TELEPHONE ENCOUNTER
Pt calling with her B/P readings. 5/18 130/69  5/21 125/71  5/24 130/67  5/27 128/69  5/30 125/68    Pt also wants to get pt assistance with Eliquis the cost went up this last time to 155.58 for 30 days.

## 2023-05-31 NOTE — TELEPHONE ENCOUNTER
Please advise on bp log.  Will give pt information on patient assistance when we call her with bp log advise patient/family

## 2023-05-31 NOTE — TELEPHONE ENCOUNTER
Called and spoke with pt, relayed [facility] message. Pt will fill out eliquis patient assistance. Mailing it to pt home.  Pt will fill out her part and bring forms back for us to fill out

## 2023-06-07 RX ORDER — AMLODIPINE BESYLATE 5 MG/1
TABLET ORAL
Qty: 90 TABLET | Refills: 1 | Status: SHIPPED | OUTPATIENT
Start: 2023-06-07

## 2023-07-10 RX ORDER — APIXABAN 2.5 MG/1
TABLET, FILM COATED ORAL
Qty: 60 TABLET | Refills: 5 | Status: SHIPPED | OUTPATIENT
Start: 2023-07-10

## 2023-07-30 ENCOUNTER — HOSPITAL ENCOUNTER (INPATIENT)
Age: 83
LOS: 1 days | Discharge: HOME | DRG: 660 | End: 2023-07-31
Attending: STUDENT IN AN ORGANIZED HEALTH CARE EDUCATION/TRAINING PROGRAM | Admitting: SURGERY
Payer: MEDICARE

## 2023-07-30 DIAGNOSIS — N20.1 URETEROLITHIASIS: Primary | ICD-10-CM

## 2023-07-30 DIAGNOSIS — Q62.11 HYDRONEPHROSIS WITH URETEROPELVIC JUNCTION (UPJ) OBSTRUCTION: ICD-10-CM

## 2023-07-30 DIAGNOSIS — R79.89 ELEVATED LACTIC ACID LEVEL: ICD-10-CM

## 2023-07-30 DIAGNOSIS — R11.2 NAUSEA AND VOMITING, UNSPECIFIED VOMITING TYPE: ICD-10-CM

## 2023-07-30 LAB
ALBUMIN SERPL-MCNC: 3.9 G/DL (ref 3.4–5)
ALBUMIN/GLOB SERPL: 1.3 {RATIO} (ref 1.1–2.2)
ALP SERPL-CCNC: 68 U/L (ref 40–129)
ALT SERPL-CCNC: 17 U/L (ref 10–40)
ANION GAP SERPL CALCULATED.3IONS-SCNC: 18 MMOL/L (ref 3–16)
AST SERPL-CCNC: 30 U/L (ref 15–37)
BASOPHILS # BLD: 0.1 K/UL (ref 0–0.2)
BASOPHILS NFR BLD: 0.4 %
BILIRUB SERPL-MCNC: 0.5 MG/DL (ref 0–1)
BUN SERPL-MCNC: 17 MG/DL (ref 7–20)
CALCIUM SERPL-MCNC: 9.5 MG/DL (ref 8.3–10.6)
CHLORIDE SERPL-SCNC: 99 MMOL/L (ref 99–110)
CO2 SERPL-SCNC: 16 MMOL/L (ref 21–32)
CREAT SERPL-MCNC: 0.9 MG/DL (ref 0.6–1.2)
DEPRECATED RDW RBC AUTO: 13.5 % (ref 12.4–15.4)
EOSINOPHIL # BLD: 0 K/UL (ref 0–0.6)
EOSINOPHIL NFR BLD: 0.2 %
GFR SERPLBLD CREATININE-BSD FMLA CKD-EPI: >60 ML/MIN/{1.73_M2}
GLUCOSE SERPL-MCNC: 143 MG/DL (ref 70–99)
HCT VFR BLD AUTO: 33.6 % (ref 36–48)
HGB BLD-MCNC: 11.3 G/DL (ref 12–16)
LACTATE BLDV-SCNC: 3.3 MMOL/L (ref 0.4–2)
LIPASE SERPL-CCNC: 11 U/L (ref 13–60)
LYMPHOCYTES # BLD: 1.3 K/UL (ref 1–5.1)
LYMPHOCYTES NFR BLD: 11.1 %
MCH RBC QN AUTO: 30.9 PG (ref 26–34)
MCHC RBC AUTO-ENTMCNC: 33.8 G/DL (ref 31–36)
MCV RBC AUTO: 91.7 FL (ref 80–100)
MONOCYTES # BLD: 0.7 K/UL (ref 0–1.3)
MONOCYTES NFR BLD: 5.9 %
NEUTROPHILS # BLD: 9.7 K/UL (ref 1.7–7.7)
NEUTROPHILS NFR BLD: 82.4 %
PLATELET # BLD AUTO: 207 K/UL (ref 135–450)
PMV BLD AUTO: 7.7 FL (ref 5–10.5)
POTASSIUM SERPL-SCNC: 5.1 MMOL/L (ref 3.5–5.1)
PROT SERPL-MCNC: 6.8 G/DL (ref 6.4–8.2)
RBC # BLD AUTO: 3.66 M/UL (ref 4–5.2)
SODIUM SERPL-SCNC: 133 MMOL/L (ref 136–145)
TROPONIN, HIGH SENSITIVITY: 10 NG/L (ref 0–14)
WBC # BLD AUTO: 11.8 K/UL (ref 4–11)

## 2023-07-30 PROCEDURE — 83605 ASSAY OF LACTIC ACID: CPT

## 2023-07-30 PROCEDURE — 36415 COLL VENOUS BLD VENIPUNCTURE: CPT

## 2023-07-30 PROCEDURE — 96375 TX/PRO/DX INJ NEW DRUG ADDON: CPT

## 2023-07-30 PROCEDURE — 85025 COMPLETE CBC W/AUTO DIFF WBC: CPT

## 2023-07-30 PROCEDURE — 84484 ASSAY OF TROPONIN QUANT: CPT

## 2023-07-30 PROCEDURE — 83690 ASSAY OF LIPASE: CPT

## 2023-07-30 PROCEDURE — 6360000002 HC RX W HCPCS: Performed by: STUDENT IN AN ORGANIZED HEALTH CARE EDUCATION/TRAINING PROGRAM

## 2023-07-30 PROCEDURE — 99285 EMERGENCY DEPT VISIT HI MDM: CPT

## 2023-07-30 PROCEDURE — 80053 COMPREHEN METABOLIC PANEL: CPT

## 2023-07-30 PROCEDURE — 96374 THER/PROPH/DIAG INJ IV PUSH: CPT

## 2023-07-30 RX ORDER — MORPHINE SULFATE 2 MG/ML
2 INJECTION, SOLUTION INTRAMUSCULAR; INTRAVENOUS ONCE
Status: COMPLETED | OUTPATIENT
Start: 2023-07-30 | End: 2023-07-30

## 2023-07-30 RX ORDER — ONDANSETRON 2 MG/ML
4 INJECTION INTRAMUSCULAR; INTRAVENOUS ONCE
Status: COMPLETED | OUTPATIENT
Start: 2023-07-30 | End: 2023-07-30

## 2023-07-30 RX ADMIN — ONDANSETRON 4 MG: 2 INJECTION INTRAMUSCULAR; INTRAVENOUS at 23:17

## 2023-07-30 RX ADMIN — MORPHINE SULFATE 2 MG: 2 INJECTION, SOLUTION INTRAMUSCULAR; INTRAVENOUS at 23:21

## 2023-07-30 ASSESSMENT — PAIN - FUNCTIONAL ASSESSMENT: PAIN_FUNCTIONAL_ASSESSMENT: 0-10

## 2023-07-30 ASSESSMENT — PAIN DESCRIPTION - PAIN TYPE: TYPE: ACUTE PAIN

## 2023-07-30 ASSESSMENT — PAIN SCALES - GENERAL: PAINLEVEL_OUTOF10: 8

## 2023-07-30 ASSESSMENT — PAIN DESCRIPTION - ORIENTATION: ORIENTATION: LEFT;LOWER

## 2023-07-30 ASSESSMENT — PAIN DESCRIPTION - LOCATION: LOCATION: ABDOMEN

## 2023-07-31 ENCOUNTER — ANESTHESIA EVENT (OUTPATIENT)
Dept: OPERATING ROOM | Age: 83
DRG: 660 | End: 2023-07-31
Payer: MEDICARE

## 2023-07-31 ENCOUNTER — APPOINTMENT (OUTPATIENT)
Dept: GENERAL RADIOLOGY | Age: 83
DRG: 660 | End: 2023-07-31
Payer: MEDICARE

## 2023-07-31 ENCOUNTER — ANESTHESIA (OUTPATIENT)
Dept: OPERATING ROOM | Age: 83
DRG: 660 | End: 2023-07-31
Payer: MEDICARE

## 2023-07-31 ENCOUNTER — APPOINTMENT (OUTPATIENT)
Dept: CT IMAGING | Age: 83
DRG: 660 | End: 2023-07-31
Payer: MEDICARE

## 2023-07-31 VITALS
HEART RATE: 60 BPM | BODY MASS INDEX: 19.88 KG/M2 | HEIGHT: 62 IN | WEIGHT: 108 LBS | RESPIRATION RATE: 16 BRPM | TEMPERATURE: 98 F | DIASTOLIC BLOOD PRESSURE: 65 MMHG | OXYGEN SATURATION: 100 % | SYSTOLIC BLOOD PRESSURE: 144 MMHG

## 2023-07-31 PROBLEM — R11.2 NAUSEA AND VOMITING: Status: ACTIVE | Noted: 2023-07-31

## 2023-07-31 PROBLEM — Q62.11 HYDRONEPHROSIS WITH URETEROPELVIC JUNCTION (UPJ) OBSTRUCTION: Status: ACTIVE | Noted: 2023-07-31

## 2023-07-31 PROBLEM — R79.89 ELEVATED LACTIC ACID LEVEL: Status: ACTIVE | Noted: 2023-07-31

## 2023-07-31 PROBLEM — N20.1 URETEROLITHIASIS: Status: ACTIVE | Noted: 2023-07-31

## 2023-07-31 PROBLEM — N20.0 KIDNEY STONE: Status: ACTIVE | Noted: 2023-07-31

## 2023-07-31 LAB
BASE EXCESS BLDV CALC-SCNC: -3.7 MMOL/L (ref -3–3)
BILIRUB UR QL STRIP.AUTO: NEGATIVE
CLARITY UR: CLEAR
CO2 BLDV-SCNC: 21 MMOL/L
COHGB MFR BLDV: 0.5 % (ref 0–1.5)
COLOR UR: YELLOW
EKG ATRIAL RATE: 76 BPM
EKG DIAGNOSIS: NORMAL
EKG P AXIS: 61 DEGREES
EKG P-R INTERVAL: 148 MS
EKG Q-T INTERVAL: 416 MS
EKG QRS DURATION: 74 MS
EKG QTC CALCULATION (BAZETT): 468 MS
EKG R AXIS: 49 DEGREES
EKG T AXIS: 62 DEGREES
EKG VENTRICULAR RATE: 76 BPM
EPI CELLS #/AREA URNS HPF: ABNORMAL /HPF (ref 0–5)
GLUCOSE BLD-MCNC: 113 MG/DL (ref 70–99)
GLUCOSE BLD-MCNC: 117 MG/DL (ref 70–99)
GLUCOSE BLD-MCNC: 127 MG/DL (ref 70–99)
GLUCOSE BLD-MCNC: 160 MG/DL (ref 70–99)
GLUCOSE BLD-MCNC: 166 MG/DL (ref 70–99)
GLUCOSE BLD-MCNC: 187 MG/DL (ref 70–99)
GLUCOSE UR STRIP.AUTO-MCNC: NEGATIVE MG/DL
HCO3 BLDV-SCNC: 19.7 MMOL/L (ref 23–29)
HGB UR QL STRIP.AUTO: ABNORMAL
KETONES UR STRIP.AUTO-MCNC: ABNORMAL MG/DL
LACTATE BLDV-SCNC: 1.3 MMOL/L (ref 0.4–2)
LEUKOCYTE ESTERASE UR QL STRIP.AUTO: NEGATIVE
METHGB MFR BLDV: 0.1 %
MUCOUS THREADS #/AREA URNS LPF: ABNORMAL /LPF
NITRITE UR QL STRIP.AUTO: NEGATIVE
O2 THERAPY: ABNORMAL
PCO2 BLDV: 30.2 MMHG (ref 40–50)
PERFORMED ON: ABNORMAL
PH BLDV: 7.43 [PH] (ref 7.35–7.45)
PH UR STRIP.AUTO: 6.5 [PH] (ref 5–8)
PO2 BLDV: 61.6 MMHG (ref 25–40)
PROT UR STRIP.AUTO-MCNC: NEGATIVE MG/DL
RBC #/AREA URNS HPF: ABNORMAL /HPF (ref 0–4)
SAO2 % BLDV: 93 %
SP GR UR STRIP.AUTO: <=1.005 (ref 1–1.03)
UA COMPLETE W REFLEX CULTURE PNL UR: ABNORMAL
UA DIPSTICK W REFLEX MICRO PNL UR: YES
URN SPEC COLLECT METH UR: ABNORMAL
UROBILINOGEN UR STRIP-ACNC: 0.2 E.U./DL
WBC #/AREA URNS HPF: ABNORMAL /HPF (ref 0–5)

## 2023-07-31 PROCEDURE — C1769 GUIDE WIRE: HCPCS | Performed by: UROLOGY

## 2023-07-31 PROCEDURE — 2580000003 HC RX 258: Performed by: STUDENT IN AN ORGANIZED HEALTH CARE EDUCATION/TRAINING PROGRAM

## 2023-07-31 PROCEDURE — 82803 BLOOD GASES ANY COMBINATION: CPT

## 2023-07-31 PROCEDURE — 3600000004 HC SURGERY LEVEL 4 BASE: Performed by: UROLOGY

## 2023-07-31 PROCEDURE — 3600000014 HC SURGERY LEVEL 4 ADDTL 15MIN: Performed by: UROLOGY

## 2023-07-31 PROCEDURE — 2580000003 HC RX 258: Performed by: SURGERY

## 2023-07-31 PROCEDURE — 6360000002 HC RX W HCPCS: Performed by: NURSE ANESTHETIST, CERTIFIED REGISTERED

## 2023-07-31 PROCEDURE — 0T778DZ DILATION OF LEFT URETER WITH INTRALUMINAL DEVICE, VIA NATURAL OR ARTIFICIAL OPENING ENDOSCOPIC: ICD-10-PCS | Performed by: UROLOGY

## 2023-07-31 PROCEDURE — 83605 ASSAY OF LACTIC ACID: CPT

## 2023-07-31 PROCEDURE — 81001 URINALYSIS AUTO W/SCOPE: CPT

## 2023-07-31 PROCEDURE — C9113 INJ PANTOPRAZOLE SODIUM, VIA: HCPCS | Performed by: SURGERY

## 2023-07-31 PROCEDURE — 93010 ELECTROCARDIOGRAM REPORT: CPT | Performed by: INTERNAL MEDICINE

## 2023-07-31 PROCEDURE — C2617 STENT, NON-COR, TEM W/O DEL: HCPCS | Performed by: UROLOGY

## 2023-07-31 PROCEDURE — 74177 CT ABD & PELVIS W/CONTRAST: CPT

## 2023-07-31 PROCEDURE — 96361 HYDRATE IV INFUSION ADD-ON: CPT

## 2023-07-31 PROCEDURE — 36415 COLL VENOUS BLD VENIPUNCTURE: CPT

## 2023-07-31 PROCEDURE — 96375 TX/PRO/DX INJ NEW DRUG ADDON: CPT

## 2023-07-31 PROCEDURE — 2500000003 HC RX 250 WO HCPCS: Performed by: NURSE ANESTHETIST, CERTIFIED REGISTERED

## 2023-07-31 PROCEDURE — 76000 FLUOROSCOPY <1 HR PHYS/QHP: CPT

## 2023-07-31 PROCEDURE — 7100000001 HC PACU RECOVERY - ADDTL 15 MIN: Performed by: UROLOGY

## 2023-07-31 PROCEDURE — G0378 HOSPITAL OBSERVATION PER HR: HCPCS

## 2023-07-31 PROCEDURE — 6360000002 HC RX W HCPCS: Performed by: UROLOGY

## 2023-07-31 PROCEDURE — 99222 1ST HOSP IP/OBS MODERATE 55: CPT

## 2023-07-31 PROCEDURE — 2580000003 HC RX 258: Performed by: UROLOGY

## 2023-07-31 PROCEDURE — 2709999900 HC NON-CHARGEABLE SUPPLY: Performed by: UROLOGY

## 2023-07-31 PROCEDURE — 2580000003 HC RX 258: Performed by: NURSE ANESTHETIST, CERTIFIED REGISTERED

## 2023-07-31 PROCEDURE — 3700000000 HC ANESTHESIA ATTENDED CARE: Performed by: UROLOGY

## 2023-07-31 PROCEDURE — 3700000001 HC ADD 15 MINUTES (ANESTHESIA): Performed by: UROLOGY

## 2023-07-31 PROCEDURE — 6370000000 HC RX 637 (ALT 250 FOR IP)

## 2023-07-31 PROCEDURE — 93005 ELECTROCARDIOGRAM TRACING: CPT | Performed by: STUDENT IN AN ORGANIZED HEALTH CARE EDUCATION/TRAINING PROGRAM

## 2023-07-31 PROCEDURE — 6360000004 HC RX CONTRAST MEDICATION: Performed by: STUDENT IN AN ORGANIZED HEALTH CARE EDUCATION/TRAINING PROGRAM

## 2023-07-31 PROCEDURE — 7100000000 HC PACU RECOVERY - FIRST 15 MIN: Performed by: UROLOGY

## 2023-07-31 PROCEDURE — 6360000002 HC RX W HCPCS: Performed by: SURGERY

## 2023-07-31 PROCEDURE — 1200000000 HC SEMI PRIVATE

## 2023-07-31 DEVICE — URETERAL STENT
Type: IMPLANTABLE DEVICE | Site: URETER | Status: FUNCTIONAL
Brand: CONTOUR™

## 2023-07-31 RX ORDER — SODIUM CHLORIDE 0.9 % (FLUSH) 0.9 %
5-40 SYRINGE (ML) INJECTION PRN
Status: DISCONTINUED | OUTPATIENT
Start: 2023-07-31 | End: 2023-07-31 | Stop reason: HOSPADM

## 2023-07-31 RX ORDER — PANTOPRAZOLE SODIUM 40 MG/10ML
40 INJECTION, POWDER, LYOPHILIZED, FOR SOLUTION INTRAVENOUS DAILY
Status: DISCONTINUED | OUTPATIENT
Start: 2023-07-31 | End: 2023-07-31

## 2023-07-31 RX ORDER — FERROUS SULFATE 325(65) MG
325 TABLET ORAL 2 TIMES DAILY WITH MEALS
Status: DISCONTINUED | OUTPATIENT
Start: 2023-07-31 | End: 2023-07-31 | Stop reason: HOSPADM

## 2023-07-31 RX ORDER — 0.9 % SODIUM CHLORIDE 0.9 %
1000 INTRAVENOUS SOLUTION INTRAVENOUS ONCE
Status: COMPLETED | OUTPATIENT
Start: 2023-07-31 | End: 2023-07-31

## 2023-07-31 RX ORDER — SODIUM CHLORIDE, SODIUM LACTATE, POTASSIUM CHLORIDE, CALCIUM CHLORIDE 600; 310; 30; 20 MG/100ML; MG/100ML; MG/100ML; MG/100ML
INJECTION, SOLUTION INTRAVENOUS CONTINUOUS PRN
Status: DISCONTINUED | OUTPATIENT
Start: 2023-07-31 | End: 2023-07-31 | Stop reason: SDUPTHER

## 2023-07-31 RX ORDER — SODIUM CHLORIDE 9 MG/ML
INJECTION, SOLUTION INTRAVENOUS PRN
Status: DISCONTINUED | OUTPATIENT
Start: 2023-07-31 | End: 2023-07-31 | Stop reason: HOSPADM

## 2023-07-31 RX ORDER — MORPHINE SULFATE 2 MG/ML
2 INJECTION, SOLUTION INTRAMUSCULAR; INTRAVENOUS
Status: DISCONTINUED | OUTPATIENT
Start: 2023-07-31 | End: 2023-07-31 | Stop reason: HOSPADM

## 2023-07-31 RX ORDER — PROPOFOL 10 MG/ML
INJECTION, EMULSION INTRAVENOUS PRN
Status: DISCONTINUED | OUTPATIENT
Start: 2023-07-31 | End: 2023-07-31 | Stop reason: SDUPTHER

## 2023-07-31 RX ORDER — ATORVASTATIN CALCIUM 10 MG/1
10 TABLET, FILM COATED ORAL DAILY
Status: DISCONTINUED | OUTPATIENT
Start: 2023-07-31 | End: 2023-07-31 | Stop reason: HOSPADM

## 2023-07-31 RX ORDER — OXYCODONE HYDROCHLORIDE AND ACETAMINOPHEN 5; 325 MG/1; MG/1
1 TABLET ORAL EVERY 6 HOURS PRN
Qty: 12 TABLET | Refills: 0 | Status: SHIPPED | OUTPATIENT
Start: 2023-07-31 | End: 2023-08-03

## 2023-07-31 RX ORDER — DEXAMETHASONE SODIUM PHOSPHATE 4 MG/ML
INJECTION, SOLUTION INTRA-ARTICULAR; INTRALESIONAL; INTRAMUSCULAR; INTRAVENOUS; SOFT TISSUE PRN
Status: DISCONTINUED | OUTPATIENT
Start: 2023-07-31 | End: 2023-07-31 | Stop reason: SDUPTHER

## 2023-07-31 RX ORDER — DEXTROSE MONOHYDRATE 100 MG/ML
INJECTION, SOLUTION INTRAVENOUS CONTINUOUS PRN
Status: DISCONTINUED | OUTPATIENT
Start: 2023-07-31 | End: 2023-07-31 | Stop reason: HOSPADM

## 2023-07-31 RX ORDER — ACETAMINOPHEN 325 MG/1
650 TABLET ORAL EVERY 6 HOURS PRN
Status: DISCONTINUED | OUTPATIENT
Start: 2023-07-31 | End: 2023-07-31 | Stop reason: HOSPADM

## 2023-07-31 RX ORDER — POLYETHYLENE GLYCOL 3350 17 G/17G
17 POWDER, FOR SOLUTION ORAL DAILY PRN
Status: DISCONTINUED | OUTPATIENT
Start: 2023-07-31 | End: 2023-07-31 | Stop reason: HOSPADM

## 2023-07-31 RX ORDER — SODIUM CHLORIDE 0.9 % (FLUSH) 0.9 %
5-40 SYRINGE (ML) INJECTION EVERY 12 HOURS SCHEDULED
Status: DISCONTINUED | OUTPATIENT
Start: 2023-07-31 | End: 2023-07-31 | Stop reason: HOSPADM

## 2023-07-31 RX ORDER — ACETAMINOPHEN 650 MG/1
650 SUPPOSITORY RECTAL EVERY 6 HOURS PRN
Status: DISCONTINUED | OUTPATIENT
Start: 2023-07-31 | End: 2023-07-31 | Stop reason: HOSPADM

## 2023-07-31 RX ORDER — AMLODIPINE BESYLATE 5 MG/1
5 TABLET ORAL DAILY
Status: DISCONTINUED | OUTPATIENT
Start: 2023-07-31 | End: 2023-07-31 | Stop reason: HOSPADM

## 2023-07-31 RX ORDER — ENOXAPARIN SODIUM 100 MG/ML
30 INJECTION SUBCUTANEOUS DAILY
Status: DISCONTINUED | OUTPATIENT
Start: 2023-07-31 | End: 2023-07-31

## 2023-07-31 RX ORDER — INSULIN LISPRO 100 [IU]/ML
0-4 INJECTION, SOLUTION INTRAVENOUS; SUBCUTANEOUS NIGHTLY
Status: DISCONTINUED | OUTPATIENT
Start: 2023-07-31 | End: 2023-07-31 | Stop reason: HOSPADM

## 2023-07-31 RX ORDER — LIDOCAINE HYDROCHLORIDE 20 MG/ML
INJECTION, SOLUTION INFILTRATION; PERINEURAL PRN
Status: DISCONTINUED | OUTPATIENT
Start: 2023-07-31 | End: 2023-07-31 | Stop reason: SDUPTHER

## 2023-07-31 RX ORDER — INSULIN LISPRO 100 [IU]/ML
0-4 INJECTION, SOLUTION INTRAVENOUS; SUBCUTANEOUS EVERY 6 HOURS
Status: DISCONTINUED | OUTPATIENT
Start: 2023-07-31 | End: 2023-07-31 | Stop reason: HOSPADM

## 2023-07-31 RX ORDER — ONDANSETRON 4 MG/1
4 TABLET, ORALLY DISINTEGRATING ORAL EVERY 8 HOURS PRN
Status: DISCONTINUED | OUTPATIENT
Start: 2023-07-31 | End: 2023-07-31 | Stop reason: HOSPADM

## 2023-07-31 RX ORDER — DIPHENHYDRAMINE HYDROCHLORIDE 50 MG/ML
12.5 INJECTION INTRAMUSCULAR; INTRAVENOUS
Status: DISCONTINUED | OUTPATIENT
Start: 2023-07-31 | End: 2023-07-31 | Stop reason: HOSPADM

## 2023-07-31 RX ORDER — MORPHINE SULFATE 4 MG/ML
4 INJECTION, SOLUTION INTRAMUSCULAR; INTRAVENOUS
Status: DISCONTINUED | OUTPATIENT
Start: 2023-07-31 | End: 2023-07-31 | Stop reason: HOSPADM

## 2023-07-31 RX ORDER — OXYCODONE HYDROCHLORIDE 5 MG/1
10 TABLET ORAL PRN
Status: DISCONTINUED | OUTPATIENT
Start: 2023-07-31 | End: 2023-07-31 | Stop reason: HOSPADM

## 2023-07-31 RX ORDER — ONDANSETRON 2 MG/ML
4 INJECTION INTRAMUSCULAR; INTRAVENOUS EVERY 6 HOURS PRN
Status: DISCONTINUED | OUTPATIENT
Start: 2023-07-31 | End: 2023-07-31 | Stop reason: HOSPADM

## 2023-07-31 RX ORDER — OXYCODONE HYDROCHLORIDE 5 MG/1
5 TABLET ORAL PRN
Status: DISCONTINUED | OUTPATIENT
Start: 2023-07-31 | End: 2023-07-31 | Stop reason: HOSPADM

## 2023-07-31 RX ORDER — ONDANSETRON 2 MG/ML
INJECTION INTRAMUSCULAR; INTRAVENOUS PRN
Status: DISCONTINUED | OUTPATIENT
Start: 2023-07-31 | End: 2023-07-31 | Stop reason: SDUPTHER

## 2023-07-31 RX ORDER — ONDANSETRON 2 MG/ML
4 INJECTION INTRAMUSCULAR; INTRAVENOUS
Status: DISCONTINUED | OUTPATIENT
Start: 2023-07-31 | End: 2023-07-31 | Stop reason: HOSPADM

## 2023-07-31 RX ORDER — PANTOPRAZOLE SODIUM 40 MG/1
40 TABLET, DELAYED RELEASE ORAL
Status: DISCONTINUED | OUTPATIENT
Start: 2023-08-01 | End: 2023-07-31 | Stop reason: HOSPADM

## 2023-07-31 RX ORDER — LABETALOL HYDROCHLORIDE 5 MG/ML
10 INJECTION, SOLUTION INTRAVENOUS
Status: DISCONTINUED | OUTPATIENT
Start: 2023-07-31 | End: 2023-07-31 | Stop reason: HOSPADM

## 2023-07-31 RX ADMIN — PANTOPRAZOLE SODIUM 40 MG: 40 INJECTION, POWDER, FOR SOLUTION INTRAVENOUS at 11:03

## 2023-07-31 RX ADMIN — LIDOCAINE HYDROCHLORIDE 100 MG: 20 INJECTION, SOLUTION INFILTRATION; PERINEURAL at 14:36

## 2023-07-31 RX ADMIN — ONDANSETRON HYDROCHLORIDE 4 MG: 2 INJECTION, SOLUTION INTRAMUSCULAR; INTRAVENOUS at 14:45

## 2023-07-31 RX ADMIN — CEFAZOLIN 2000 MG: 2 INJECTION, POWDER, FOR SOLUTION INTRAMUSCULAR; INTRAVENOUS at 14:33

## 2023-07-31 RX ADMIN — SODIUM CHLORIDE, POTASSIUM CHLORIDE, SODIUM LACTATE AND CALCIUM CHLORIDE: 600; 310; 30; 20 INJECTION, SOLUTION INTRAVENOUS at 14:36

## 2023-07-31 RX ADMIN — SODIUM CHLORIDE 1000 ML: 9 INJECTION, SOLUTION INTRAVENOUS at 00:32

## 2023-07-31 RX ADMIN — METOPROLOL TARTRATE 25 MG: 25 TABLET, FILM COATED ORAL at 11:38

## 2023-07-31 RX ADMIN — IOPAMIDOL 65 ML: 755 INJECTION, SOLUTION INTRAVENOUS at 00:33

## 2023-07-31 RX ADMIN — DEXAMETHASONE SODIUM PHOSPHATE 4 MG: 4 INJECTION, SOLUTION INTRAMUSCULAR; INTRAVENOUS at 14:45

## 2023-07-31 RX ADMIN — AMLODIPINE BESYLATE 5 MG: 5 TABLET ORAL at 11:38

## 2023-07-31 RX ADMIN — PROPOFOL 120 MG: 10 INJECTION, EMULSION INTRAVENOUS at 14:36

## 2023-07-31 RX ADMIN — Medication 5 ML: at 11:03

## 2023-07-31 RX ADMIN — ATORVASTATIN CALCIUM 10 MG: 10 TABLET, FILM COATED ORAL at 11:38

## 2023-07-31 ASSESSMENT — PAIN - FUNCTIONAL ASSESSMENT
PAIN_FUNCTIONAL_ASSESSMENT: 0-10
PAIN_FUNCTIONAL_ASSESSMENT: NONE - DENIES PAIN

## 2023-07-31 ASSESSMENT — PAIN SCALES - GENERAL
PAINLEVEL_OUTOF10: 0
PAINLEVEL_OUTOF10: 2
PAINLEVEL_OUTOF10: 0

## 2023-07-31 ASSESSMENT — LIFESTYLE VARIABLES: HOW MANY STANDARD DRINKS CONTAINING ALCOHOL DO YOU HAVE ON A TYPICAL DAY: PATIENT DOES NOT DRINK

## 2023-07-31 ASSESSMENT — PAIN DESCRIPTION - LOCATION: LOCATION: FLANK;ABDOMEN

## 2023-07-31 ASSESSMENT — PAIN DESCRIPTION - ORIENTATION: ORIENTATION: LEFT;LOWER

## 2023-07-31 NOTE — BRIEF OP NOTE
Brief Postoperative Note      Patient: Asael Dykes  YOB: 1940  MRN: [id number]    Date of Procedure: 7/31/2023    Pre-Op Diagnosis Codes:     * Left ureteral stone [N20.1]    Post-Op Diagnosis: Same       Procedure(s):  CYSTOSCOPY,  LEFT STENT PLACEMENT    Surgeon(s):  Roseann Aguilar MD    Assistant:  * No surgical staff found *    Anesthesia: General    Estimated Blood Loss (mL): Minimal    Complications: None    Specimens:   * No specimens in log *    Implants:  Implant Name Type Inv. Item Serial No.  Lot No. LRB No. Used Action   STENT URET 6FR L22CM PERCFLX HYDR+ SFT PGTL TAPR TIP GRAD - [id number]  STENT URET 6FR L22CM PERCFLX HYDR+ SFT PGTL TAPR TIP GRAD  [employer] [facility]- [id number] N/A 1 Implanted         Drains: * No LDAs found *    Findings: UPJ stone, pushed back. PLAN:  She can be discharged per medicine when stable. F/U in the office in 1-2 weeks to schedule outpatient ESWL.       Electronically signed by Roseann Aguilar MD on 7/31/2023 at 3:37 PM

## 2023-07-31 NOTE — CONSULTS
Urology Consult Note      Reason for Consultation: ureteral stone    Chief Complaint: \"left abdominal pain\"  HPI:  Collette Eve is a 82 y.o. female with history of pancreatic ca sp whipple procedure f/b UC hem/onc who presents for left sided abdominal pain with nausea. It started yesterday and was severe. 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    history of stage I-A, grade 1, uterine cancer with myometrial invasion 2 mm out of 19 mm with no cervical involvement, no lymphvascular space invasion, and all pelvic lymph nodes are negative    GERD (gastroesophageal reflux disease)     Hyperlipidemia     Hypertension     Kidney stone 7/31/2023    Nausea     PAF (paroxysmal atrial fibrillation) ([address])     Pancreas divisum     Right carpal tunnel syndrome 09/07/2016    Type II or unspecified type diabetes mellitus without mention of complication, not stated as uncontrolled        Past Surgical History:   Procedure Laterality Date    BLADDER SURGERY      tuck    BREAST BIOPSY      CARPAL TUNNEL RELEASE      LEFT HAND    CARPAL TUNNEL RELEASE Right     CHOLECYSTECTOMY      ERCP N/A 2/17/2022    ENDOSCOPIC RETROGRADE CHOLANGIOPANCREATOGRAPHY performed by Ortega Terrazas MD at [facility] ([facility])      OTHER SURGICAL HISTORY  03/09/2022    EGD W/EUS FNA    CAROLINE AND BSO (CERVIX REMOVED)  2011    he patient underwent her staging procedure consisting of a hysterectomy, bilateral salpingo-oophorectomy, and bilateral pelvic lymph node dissection in March of 2011 under the direction of Dr. Lesa Monterroso. The patient followed with Dr. Lesa Monterroso until he moved to another location outside of the city.   The patient has had no evidence of disease to date of 7-    UPPER GASTROINTESTINAL ENDOSCOPY N/A 3/9/2022

## 2023-07-31 NOTE — CARE COORDINATION
Case Management Assessment  Initial Evaluation    Date/Time of Evaluation: 7/31/2023 4:19 PM  Assessment Completed by: Eitan Cui RN    If patient is discharged prior to next notation, then this note serves as note for discharge by case management. Patient Name: Ag Russo                   YOB: 1940  Diagnosis: Kidney stone [N20.0]  Ureterolithiasis [N20.1]  Elevated lactic acid level [R79.89]  Nausea and vomiting, unspecified vomiting type [R11.2]                   Date / Time: 7/30/2023 10:11 PM    Patient Admission Status: Inpatient   Readmission Risk (Low < 19, Mod (19-27), High > 27): Readmission Risk Score: 10.2    Current PCP: Merced Coelho MD  PCP verified by CM? Yes (Lisa)    Chart Reviewed: Yes      History Provided by: Patient  Patient Orientation: Alert and Oriented    Patient Cognition: Alert    Hospitalization in the last 30 days (Readmission):  No    If yes, Readmission Assessment in CM Navigator will be completed. Advance Directives:      Code Status: Full Code   Patient's Primary Decision Maker is: Legal Next of Kin    Primary Decision Maker: Leatha Luque Spouse - [phone]    Secondary Decision Maker: Darnell Hare  Child - [phone]    Discharge Planning:    Patient lives with: Spouse/Significant Other Type of Home: House  Primary Care Giver: Self  Patient Support Systems include: Spouse/Significant Other   Current Financial resources: Medicare (BCBS Medicare)  Current community resources: None  Current services prior to admission: Durable Medical Equipment            Current DME: [employer] , Wheelchair (Built in shower bench)            Type of Home Care services:  None    ADLS  Prior functional level: Independent in ADLs/IADLs  Current functional level: Independent in ADLs/IADLs    PT AM-PAC:   /24  OT AM-PAC:   /24    Family can provide assistance at DC:  Yes  Would you like Case Management to discuss the discharge plan with any other

## 2023-07-31 NOTE — ED NOTES
[address] [place] [address] sent to Dr. Veronica Gross for admission      [facility]   07/31/23 2664

## 2023-07-31 NOTE — PLAN OF CARE
Problem: Discharge Planning  Goal: Discharge to home or other facility with appropriate resources  Outcome: Progressing  Flowsheets  Taken 7/31/2023 0332  Discharge to home or other facility with appropriate resources: Identify barriers to discharge with patient and caregiver  Taken 7/31/2023 0253  Discharge to home or other facility with appropriate resources: Identify barriers to discharge with patient and caregiver     Problem: Safety - Adult  Goal: Free from fall injury  Outcome: Progressing  Flowsheets (Taken 7/31/2023 0332)  Free From Fall Injury: Instruct family/caregiver on patient safety     Problem: ABCDS Injury Assessment  Goal: Absence of physical injury  Outcome: Progressing  Flowsheets (Taken 7/31/2023 0332)  Absence of Physical Injury: Implement safety measures based on patient assessment     Problem: Skin/Tissue Integrity  Goal: Absence of new skin breakdown  Description: 1. Monitor for areas of redness and/or skin breakdown  2. Assess vascular access sites hourly  3. Every 4-6 hours minimum:  Change oxygen saturation probe site  4. Every 4-6 hours:  If on nasal continuous positive airway pressure, respiratory therapy assess nares and determine need for appliance change or resting period.   Outcome: Progressing     Problem: Pain  Goal: Verbalizes/displays adequate comfort level or baseline comfort level  Outcome: Progressing  Flowsheets (Taken 7/31/2023 0332)  Verbalizes/displays adequate comfort level or baseline comfort level:   Assess pain using appropriate pain scale   Encourage patient to monitor pain and request assistance   Administer analgesics based on type and severity of pain and evaluate response   Implement non-pharmacological measures as appropriate and evaluate response

## 2023-07-31 NOTE — DISCHARGE SUMMARY
See H&P tab. Medication List        START taking these medications      oxyCODONE-acetaminophen 5-325 MG per tablet  Commonly known as: Percocet  Take 1 tablet by mouth every 6 hours as needed for Pain for up to 3 days. Intended supply: 3 days.  Take lowest dose possible to manage pain Max Daily Amount: 4 tablets            CONTINUE taking these medications      acyclovir 800 MG tablet  Commonly known as: ZOVIRAX     amLODIPine 5 MG tablet  Commonly known as: NORVASC  TAKE 1 TABLET BY MOUTH EVERY DAY     atorvastatin 10 MG tablet  Commonly known as: LIPITOR  Take 1 tablet by mouth daily     b complex vitamins capsule     calcium carbonate 500 MG Tabs tablet  Commonly known as: OSCAL     CoQ10 100 MG Caps     Eliquis 2.5 MG Tabs tablet  Generic drug: apixaban  TAKE 1 TABLET BY MOUTH TWICE A DAY     fluticasone 50 MCG/ACT nasal spray  Commonly known as: Flonase  2 sprays by Nasal route daily     lipase-protease-amylase 14200-88727 units delayed release capsule  Commonly known as: CREON     metFORMIN 500 MG tablet  Commonly known as: GLUCOPHAGE  Take 1 tablet by mouth 2 times daily (with meals)     metoprolol tartrate 25 MG tablet  Commonly known as: LOPRESSOR  TAKE 1 TABLET BY MOUTH TWICE A DAY     omega-3 fish oil 1000 MG Caps     omeprazole 40 MG delayed release capsule  Commonly known as: PRILOSEC     OSTEO BI-FLEX ADV DOUBLE ST PO     oxybutynin 5 MG tablet  Commonly known as: DITROPAN  TAKE 1 TABLET BY MOUTH 2 TIMES DAILY     PRESERVISION AREDS 2 PO     PREVAGEN PO     PROBIOTIC ADVANCED PO     vitamin D 1000 UNIT Tabs tablet  Commonly known as: CHOLECALCIFEROL               Where to Get Your Medications        These medications were sent to [address]      Phone: [phone]   oxyCODONE-acetaminophen 5-325 MG per tablet       Pee Myers PA-C  7/31/2023   4:41 PM

## 2023-07-31 NOTE — PLAN OF CARE
Problem: Discharge Planning  Goal: Discharge to home or other facility with appropriate resources  7/31/2023 1650 by Collin Bacon RN  Outcome: Adequate for Discharge  Flowsheets (Taken 7/31/2023 1105)  Discharge to home or other facility with appropriate resources: Identify barriers to discharge with patient and caregiver  7/31/2023 0332 by Carla Hunter RN  Outcome: Progressing  Flowsheets  Taken 7/31/2023 0332  Discharge to home or other facility with appropriate resources: Identify barriers to discharge with patient and caregiver  Taken 7/31/2023 0253  Discharge to home or other facility with appropriate resources: Identify barriers to discharge with patient and caregiver     Problem: Safety - Adult  Goal: Free from fall injury  7/31/2023 1650 by Collin Bacon RN  Outcome: Adequate for Discharge  7/31/2023 0332 by Carla Hunter RN  Outcome: Progressing  Flowsheets (Taken 7/31/2023 0332)  Free From Fall Injury: Instruct family/caregiver on patient safety     Problem: ABCDS Injury Assessment  Goal: Absence of physical injury  7/31/2023 1650 by Collin Bacon RN  Outcome: Adequate for Discharge  7/31/2023 0332 by Carla Hunter RN  Outcome: Progressing  Flowsheets (Taken 7/31/2023 0332)  Absence of Physical Injury: Implement safety measures based on patient assessment     Problem: Skin/Tissue Integrity  Goal: Absence of new skin breakdown  Description: 1. Monitor for areas of redness and/or skin breakdown  2. Assess vascular access sites hourly  3. Every 4-6 hours minimum:  Change oxygen saturation probe site  4. Every 4-6 hours:  If on nasal continuous positive airway pressure, respiratory therapy assess nares and determine need for appliance change or resting period.   7/31/2023 1650 by Collin Bacon RN  Outcome: Adequate for Discharge  7/31/2023 5266 by Carla Hunter RN  Outcome: Progressing     Problem: Pain  Goal: Verbalizes/displays adequate comfort level or baseline comfort

## 2023-07-31 NOTE — ED PROVIDER NOTES
Emergency Department Encounter    Patient: Madhu Irizarry  MRN: [id number]  : 1940  Date of Evaluation: 2023  ED Provider:  Miah Tierney MD    Triage Chief Complaint:   Abdominal Pain (Sudden onset of left lower quadrant pain associated with nausea & vomiting. Fentanyl 50mcg and zofran 4mg iv given en route.)    [place]:  Madhu Irizarry is a 82 y.o. female presenting with complaints of left lower quadrant and left flank pain. Patient states overnight she began having left lower quadrant left flank pain. States she also had about 5 episodes of nonbilious nonbloody vomiting. Denies diarrhea constipation or blood or melena stools. Denies vaginal bleeding or discharge. Denies urinary symptoms include dysuria, increased frequency, urgency, hematuria. Denies any chest pain or shortness of breath. States she does have a history of pancreatic cancer with Whipple in . Denies fevers or chills. Denies lightheadedness or dizziness. Denies neck or back pain.     ROS - see HPI, below listed is current ROS at time of my eval:  At least 14 systems reviewed, negative other than HPI    Past Medical History:   Diagnosis Date    Arthritis of left knee     COVID-19 2021    Endometrioid adenocarcinoma 2011    history of stage I-A, grade 1, uterine cancer with myometrial invasion 2 mm out of 19 mm with no cervical involvement, no lymphvascular space invasion, and all pelvic lymph nodes are negative    GERD (gastroesophageal reflux disease)     Hyperlipidemia     Hypertension     Kidney stone 2023    Nausea     PAF (paroxysmal atrial fibrillation) (HCC)     Pancreas divisum     Right carpal tunnel syndrome 2016    Type II or unspecified type diabetes mellitus without mention of complication, not stated as uncontrolled      Past Surgical History:   Procedure Laterality Date    BLADDER SURGERY      tuck    BREAST BIOPSY      CARPAL TUNNEL RELEASE      LEFT HAND    CARPAL TUNNEL RELEASE Right Reason for exam:->llq/left flank pain, hx whipple from panc cancer, n/v Additional Contrast?->None Decision Support Exception - unselect if not a suspected or confirmed emergency medical condition->Emergency Medical Condition (MA) Reason for Exam: LLQ/flank pain, hx of whipple/panc CA, n/v FINDINGS: Lower Chest:  Calcified granulomata in the left lower lobe and mild interstitial changes at both lung bases. Base of the heart normal. Organs: Prior surgical history of Whipple procedure with a known history of pancreatic carcinoma. There is a persistent area of abnormal soft tissue attenuation at the prior site of the pancreatic head that is difficult to discretely characterize adjacent to multiple loops of bowel. Residual/recurrent malignancy is probable. The visualized pancreatic tail is unremarkable. The liver is normal.  The gallbladder is absent. Calcified granulomata in the spleen. Normal adrenal glands. Right kidney is normal. Moderate left hydronephrosis secondary to a 5 mm calculus in the proximal left ureter near the UPJ. GI/Bowel: Questionable pattern of peritoneal implants along the lesser curvature of the stomach near the antrum. Poor visualization of duodenum following prior surgery. No pattern of small bowel obstruction. A normal appendix is visualized. Abnormal attenuation of the transverse colon with presumed peritoneal implants along the margin of the transverse colon. No pattern of obstruction. Pelvis: The bladder is unremarkable. The uterus is absent. Peritoneum/Retroperitoneum: The aorta tapers normally. Pattern of peritoneal carcinomatosis with multiple heterogeneous soft tissue nodules anteriorly that are new from prior imaging. This includes an index nodule in the left upper quadrant that is measured at 2.0 x 2.0 cm. Bones/Soft Tissues: No significant skeletal abnormalities. 1. History of pancreatic carcinoma with prior Whipple.  2. Ill-defined soft tissue mass at the prior

## 2023-07-31 NOTE — ED NOTES
I would like him to continue 1 daily except Sunday and he should take 2 daily, 8 tablets weekly, this was corrected on his prescription recent to his pharmacy Pt placed on 2L oxygen per nasal cannula for saturation of 76%. Dr. Alondra Richard at bedside.       Ehsan Fraser RN  07/30/23 4275

## 2023-07-31 NOTE — ANESTHESIA POSTPROCEDURE EVALUATION
Department of Anesthesiology  Postprocedure Note    Patient: Nisha Felix  MRN: [id number]  YOB: 1940  Date of evaluation: 7/31/2023      Procedure Summary     Date: 07/31/23 Room / Location: [address] [place] [address] / [facility]    Anesthesia Start: 0809 Anesthesia Stop: 1751    Procedure: CYSTOSCOPY,  LEFT [address], [address] (Bladder) Diagnosis:       Left ureteral stone      (Left ureteral stone [N20.1])    Surgeons: Les Hoffmann MD Responsible Provider: Ishmael Shaffer MD    Anesthesia Type: general ASA Status: 3          Anesthesia Type: No value filed. Courtney Phase I: Courtney Score: 9    Courtney Phase II:        Anesthesia Post Evaluation    Patient location during evaluation: PACU  Patient participation: complete - patient participated  Level of consciousness: awake and alert  Airway patency: patent  Nausea & Vomiting: no nausea and no vomiting  Complications: no  Cardiovascular status: blood pressure returned to baseline  Respiratory status: acceptable  Hydration status: euvolemic  Comments: VSS on transfer to phase 2 recovery. No anesthetic complications.   Pain management: adequate

## 2023-07-31 NOTE — PROGRESS NOTES
4 Eyes Skin Assessment     NAME:  Sumanth Ayala  YOB: 1940  MEDICAL RECORD NUMBER:  [id number]    The patient is being assessed for  Admission    I agree that at least one RN has performed a thorough Head to Toe Skin Assessment on the patient. ALL assessment sites listed below have been assessed. Areas assessed by both nurses:    Head, Face, Ears, Shoulders, Back, Chest, Arms, Elbows, Hands, Sacrum. Buttock, Coccyx, Ischium, and Legs. Feet and Heels        Does the Patient have a Wound? No noted wound(s)       Jacoby Prevention initiated by RN: Yes  Wound Care Orders initiated by RN: Yes    Pressure Injury (Stage 3,4, Unstageable, DTI, NWPT, and Complex wounds) if present, place Wound referral order by RN under : No    New Ostomies, if present place, Ostomy referral order under : No     Nurse 1 eSignature: Electronically signed by Donna Bowen RN on 7/31/23 at 3:33 AM EDT    **SHARE this note so that the co-signing nurse can place an eSignature**    Nurse 2 eSignature: Electronically signed by Deirdre Yañez RN on 7/31/23 at 4:45 AM EDT     Patient is able to demonstrate the ability to move from a reclining position to an upright position within the recliner. Bedside Mobility Assessment Tool (BMAT):     Assessment Level 1- Sit and Shake    1. From a semi-reclined position, ask patient to sit up and rotate to a seated position at the side of the bed. Can use the bedrail. 2. Ask patient to reach out and grab your hand and shake making sure patient reaches across his/her midline. Pass- Patient is able to come to a seated position, maintain core strength. Maintains seated balance while reaching across midline. Move on to Assessment Level 2. Assessment Level 2- Stretch and Point   1. With patient in seated position at the side of the bed, have patient place both feet on the floor (or stool) with knees no higher than hips.     2. Ask patient to stretch one leg and
Discharge instructions given. Pt verbalized understanding denies any questions/needs at this time.  at bedside. Pt wants to eat dinner before leaving. Will request transport after eating.
Informed consent signed by patient. Signature witnessed by this writer.
Phase I (PACU)  1. Patient is identified using name and the date of birth. 2.  The patient is free from signs and symptoms of chemical, electrical, laser, radiation, positioning, or transfer/transport injury. 3.  The patient receives appropriate medication(s), safely administered during the Perioperative period. 4.  The patient has wound/tissue perfusion consistent with or improved from baseline levels established preoperatively. 5.  The patient is at or returning to normothermia at the conclusion of the immediate postoperative period. 6.  The patient's fluid, electrolyte, and acid base balances are consistent with or improved from baseline levels established preoperatively. 7.  The patient's pulmonary function is consistent with or improved from baseline levels established preoperatively. 8.  The patient's cardiovascular status is consistent with or improved from baseline levels established preoperatively. 9.  The patient/caregiver participates in decisions affecting his or her Perioperative plan of care. 10. The patient's care is consistent with the individualized Perioperative plan of care. 11. The patient's right to privacy is maintained. 12. The patient is the recipient of competent and ethical care within legal standards of practice. 13.  The patient's value system, lifestyle, ethnicity, and culture are considered, respected, and incorporated in the Perioperative plan of care. 14.  The patient demonstrates and/or reports adequate pain control throughout the the Perioperative period. 15. The patient's neurological status is consistent with or improved from baseline levels established preoperatively. 16.  The patient/caregiver demonstrates knowledge of the expected responses to the operative or invasive procedure. 17.  Patient/Caregiver has reduced anxiety. Interventions- Familiarize with environment and equipment.   18.  Other:  19.Other:
Pt admitted to Room 236. Pt a/o. VSS. [place] to room. Denies c/o pain. Assessment complete as charted. Call light in reach. Denies other needs. Will continue to monitor. Chlorhexidine bath given.
Removed PIV for discharge. Pt tolerated well. Drsg in place.
Report called to [facility]
Transport here for patient
Urology consult called to the office.
and Phase II process. 23.  Patient pain level is established preoperatively using age appropriate pain scale. 24.  The patient will move to fall risk upon sedation- during and through the recovery phase. Interventions- orient the patient to the environment, especially the location of the bathroom; provide treaded socks/non-skid footwear; demonstrate and teach back use of the nurse's call system; instruct the patient to call for help before getting out of bed; lock all movable equipment before transferring patient; keep bed in lowest position possible.  25.  Other:

## 2023-08-03 NOTE — OP NOTE
[facility] [address] [place]                 [address] [address]                                OPERATIVE REPORT    PATIENT NAME: Chani Bennett                      :        1940  MED REC NO:   [id number]                          ROOM:  ACCOUNT NO:   [de-identified]                           ADMIT DATE: 2023  PROVIDER:     Guillermina Amos MD    DATE OF PROCEDURE:  2023    PREOPERATIVE DIAGNOSIS:  Left ureteral calculus. POSTOPERATIVE DIAGNOSIS:  Left ureteral calculus. OPERATION PERFORMED:  Cystoscopy with left ureteral stent placement. PRIMARY SURGEON:  Guillermina Amos MD    ANESTHESIA:  General.    OPERATIVE FINDINGS:  UPJ stone. ESTIMATED BLOOD LOSS:  2 mL. HISTORY AND INDICATIONS:  The patient is an elderly female who was  admitted to the hospital with renal colic and was diagnosed on CAT scan  with a UPJ stone. Urology consultation had been obtained and after being medically  cleared, she was scheduled for today's procedure. The risks, benefits,  and expected outcomes of the procedure have been discussed. DETAILS OF THE PROCEDURE:  After obtaining an informed consent, the  patient was taken to the operative suite. She was given a general  anesthetic and placed on the operative table in a modified dorsal  lithotomy position. Prepping and draping were done in a sterile  fashion. Cystourethroscopy was then performed and the bladder itself  was evaluated for any masses, tumors, or other stones. None were found. Both ureteral orifices were orthotopic with clear efflux and there was  minimal discharge from the left side. Under fluoroscopy which was used throughout the remainder of this  patient's procedure for both therapeutic and diagnostic purposes, a  Glidewire was then placed into the left ureteral orifice.   It was  advanced beyond the level of her stone and with some manipulation of the  wire, the stone was able to be dislodged from the UPJ area and floated  back into the renal pelvis. Over top of the Glidewire using the  cystoscope, a 6 x 22 double-J stent was then deployed showing an  adequate curl within the patient's renal pelvis as well as the bladder. Overall, she tolerated the procedure well. After draining her bladder,  lidocaine jelly was applied, and she was taken back to the postop  recovery room in stable condition.         Willy Armendariz MD    D: 08/03/2023 9:29:42       T: 08/03/2023 9:32:04     [email or web address]  Job#: [id number]     Doc#: [id number]    CC:

## 2023-09-05 ENCOUNTER — TELEPHONE (OUTPATIENT)
Dept: CARDIOLOGY CLINIC | Age: 83
End: 2023-09-05

## 2023-09-05 NOTE — TELEPHONE ENCOUNTER
Ok to hold x 48 hrs prior to stent removal.  Restart as soon as possible post procedure. Low risk, stable at recent OV.

## 2023-09-05 NOTE — TELEPHONE ENCOUNTER
CARDIAC CLEARANCE REQUEST    What type of procedure are you having: having stent removed from Cystoscopy and needs kidney stone broken up to remove.     Are you taking any blood thinners: Eliquis    When is your procedure scheduled for: 9/12/23    What physician is performing your procedure:  Dr Dennis Bianchi  Phone Number:  [phone]  Fax number to send the letter:  [phone]

## 2023-09-19 ENCOUNTER — HOSPITAL ENCOUNTER (OUTPATIENT)
Age: 83
Setting detail: OBSERVATION
LOS: 1 days | Discharge: HOME | End: 2023-09-20
Attending: STUDENT IN AN ORGANIZED HEALTH CARE EDUCATION/TRAINING PROGRAM | Admitting: INTERNAL MEDICINE
Payer: MEDICARE

## 2023-09-19 ENCOUNTER — APPOINTMENT (OUTPATIENT)
Dept: CT IMAGING | Age: 83
End: 2023-09-19
Payer: MEDICARE

## 2023-09-19 ENCOUNTER — APPOINTMENT (OUTPATIENT)
Dept: GENERAL RADIOLOGY | Age: 83
End: 2023-09-19
Payer: MEDICARE

## 2023-09-19 DIAGNOSIS — R63.0 ANOREXIA: ICD-10-CM

## 2023-09-19 DIAGNOSIS — R42 DIZZINESS: Primary | ICD-10-CM

## 2023-09-19 DIAGNOSIS — N39.0 URINARY TRACT INFECTION WITHOUT HEMATURIA, SITE UNSPECIFIED: ICD-10-CM

## 2023-09-19 DIAGNOSIS — C25.9 MALIGNANT NEOPLASM OF PANCREAS, UNSPECIFIED LOCATION OF MALIGNANCY (HCC): ICD-10-CM

## 2023-09-19 PROBLEM — R10.9 ABDOMINAL PAIN: Status: ACTIVE | Noted: 2023-09-19

## 2023-09-19 LAB
ALBUMIN SERPL-MCNC: 4 G/DL (ref 3.4–5)
ALBUMIN/GLOB SERPL: 1.4 {RATIO} (ref 1.1–2.2)
ALP SERPL-CCNC: 213 U/L (ref 40–129)
ALT SERPL-CCNC: 74 U/L (ref 10–40)
ANION GAP SERPL CALCULATED.3IONS-SCNC: 17 MMOL/L (ref 3–16)
AST SERPL-CCNC: 151 U/L (ref 15–37)
BACTERIA URNS QL MICRO: ABNORMAL /HPF
BASOPHILS # BLD: 0 K/UL (ref 0–0.2)
BASOPHILS NFR BLD: 0.5 %
BILIRUB SERPL-MCNC: 0.7 MG/DL (ref 0–1)
BILIRUB UR QL STRIP.AUTO: NEGATIVE
BUN SERPL-MCNC: 16 MG/DL (ref 7–20)
CALCIUM SERPL-MCNC: 9.6 MG/DL (ref 8.3–10.6)
CHLORIDE SERPL-SCNC: 98 MMOL/L (ref 99–110)
CLARITY UR: CLEAR
CO2 SERPL-SCNC: 21 MMOL/L (ref 21–32)
COLOR UR: YELLOW
CREAT SERPL-MCNC: 0.6 MG/DL (ref 0.6–1.2)
DEPRECATED RDW RBC AUTO: 13.8 % (ref 12.4–15.4)
EOSINOPHIL # BLD: 0 K/UL (ref 0–0.6)
EOSINOPHIL NFR BLD: 0.4 %
GFR SERPLBLD CREATININE-BSD FMLA CKD-EPI: >60 ML/MIN/{1.73_M2}
GLUCOSE SERPL-MCNC: 149 MG/DL (ref 70–99)
GLUCOSE UR STRIP.AUTO-MCNC: NEGATIVE MG/DL
HCT VFR BLD AUTO: 32.3 % (ref 36–48)
HGB BLD-MCNC: 10.7 G/DL (ref 12–16)
HGB UR QL STRIP.AUTO: NEGATIVE
KETONES UR STRIP.AUTO-MCNC: 15 MG/DL
LEUKOCYTE ESTERASE UR QL STRIP.AUTO: ABNORMAL
LYMPHOCYTES # BLD: 2.3 K/UL (ref 1–5.1)
LYMPHOCYTES NFR BLD: 28 %
MAGNESIUM SERPL-MCNC: 1.4 MG/DL (ref 1.8–2.4)
MCH RBC QN AUTO: 30 PG (ref 26–34)
MCHC RBC AUTO-ENTMCNC: 33.1 G/DL (ref 31–36)
MCV RBC AUTO: 90.6 FL (ref 80–100)
MONOCYTES # BLD: 0.7 K/UL (ref 0–1.3)
MONOCYTES NFR BLD: 9.2 %
NEUTROPHILS # BLD: 5 K/UL (ref 1.7–7.7)
NEUTROPHILS NFR BLD: 61.9 %
NITRITE UR QL STRIP.AUTO: NEGATIVE
PH UR STRIP.AUTO: 7.5 [PH] (ref 5–8)
PLATELET # BLD AUTO: 306 K/UL (ref 135–450)
PMV BLD AUTO: 8.3 FL (ref 5–10.5)
POTASSIUM SERPL-SCNC: 3 MMOL/L (ref 3.5–5.1)
PROT SERPL-MCNC: 6.9 G/DL (ref 6.4–8.2)
PROT UR STRIP.AUTO-MCNC: NEGATIVE MG/DL
RBC # BLD AUTO: 3.57 M/UL (ref 4–5.2)
RBC #/AREA URNS HPF: ABNORMAL /HPF (ref 0–4)
SODIUM SERPL-SCNC: 136 MMOL/L (ref 136–145)
SP GR UR STRIP.AUTO: 1.01 (ref 1–1.03)
UA COMPLETE W REFLEX CULTURE PNL UR: YES
UA DIPSTICK W REFLEX MICRO PNL UR: YES
URN SPEC COLLECT METH UR: ABNORMAL
UROBILINOGEN UR STRIP-ACNC: 0.2 E.U./DL
WBC # BLD AUTO: 8.1 K/UL (ref 4–11)
WBC #/AREA URNS HPF: ABNORMAL /HPF (ref 0–5)

## 2023-09-19 PROCEDURE — 71045 X-RAY EXAM CHEST 1 VIEW: CPT

## 2023-09-19 PROCEDURE — 1200000000 HC SEMI PRIVATE

## 2023-09-19 PROCEDURE — 6360000002 HC RX W HCPCS: Performed by: STUDENT IN AN ORGANIZED HEALTH CARE EDUCATION/TRAINING PROGRAM

## 2023-09-19 PROCEDURE — 87086 URINE CULTURE/COLONY COUNT: CPT

## 2023-09-19 PROCEDURE — 74177 CT ABD & PELVIS W/CONTRAST: CPT

## 2023-09-19 PROCEDURE — 2580000003 HC RX 258: Performed by: STUDENT IN AN ORGANIZED HEALTH CARE EDUCATION/TRAINING PROGRAM

## 2023-09-19 PROCEDURE — 36415 COLL VENOUS BLD VENIPUNCTURE: CPT

## 2023-09-19 PROCEDURE — 96374 THER/PROPH/DIAG INJ IV PUSH: CPT

## 2023-09-19 PROCEDURE — 99285 EMERGENCY DEPT VISIT HI MDM: CPT

## 2023-09-19 PROCEDURE — 83735 ASSAY OF MAGNESIUM: CPT

## 2023-09-19 PROCEDURE — 80053 COMPREHEN METABOLIC PANEL: CPT

## 2023-09-19 PROCEDURE — 81001 URINALYSIS AUTO W/SCOPE: CPT

## 2023-09-19 PROCEDURE — 85025 COMPLETE CBC W/AUTO DIFF WBC: CPT

## 2023-09-19 PROCEDURE — 93005 ELECTROCARDIOGRAM TRACING: CPT | Performed by: STUDENT IN AN ORGANIZED HEALTH CARE EDUCATION/TRAINING PROGRAM

## 2023-09-19 PROCEDURE — 6360000004 HC RX CONTRAST MEDICATION: Performed by: STUDENT IN AN ORGANIZED HEALTH CARE EDUCATION/TRAINING PROGRAM

## 2023-09-19 PROCEDURE — 86301 IMMUNOASSAY TUMOR CA 19-9: CPT

## 2023-09-19 PROCEDURE — 6370000000 HC RX 637 (ALT 250 FOR IP): Performed by: STUDENT IN AN ORGANIZED HEALTH CARE EDUCATION/TRAINING PROGRAM

## 2023-09-19 RX ORDER — MAGNESIUM SULFATE IN WATER 40 MG/ML
2000 INJECTION, SOLUTION INTRAVENOUS ONCE
Status: COMPLETED | OUTPATIENT
Start: 2023-09-19 | End: 2023-09-20

## 2023-09-19 RX ORDER — SODIUM CHLORIDE, SODIUM LACTATE, POTASSIUM CHLORIDE, AND CALCIUM CHLORIDE .6; .31; .03; .02 G/100ML; G/100ML; G/100ML; G/100ML
1000 INJECTION, SOLUTION INTRAVENOUS ONCE
Status: COMPLETED | OUTPATIENT
Start: 2023-09-19 | End: 2023-09-19

## 2023-09-19 RX ADMIN — POTASSIUM BICARBONATE 40 MEQ: 782 TABLET, EFFERVESCENT ORAL at 22:48

## 2023-09-19 RX ADMIN — SODIUM CHLORIDE, POTASSIUM CHLORIDE, SODIUM LACTATE AND CALCIUM CHLORIDE 1000 ML: 600; 310; 30; 20 INJECTION, SOLUTION INTRAVENOUS at 21:03

## 2023-09-19 RX ADMIN — IOPAMIDOL 75 ML: 755 INJECTION, SOLUTION INTRAVENOUS at 22:11

## 2023-09-19 RX ADMIN — MAGNESIUM SULFATE HEPTAHYDRATE 2000 MG: 40 INJECTION, SOLUTION INTRAVENOUS at 22:50

## 2023-09-19 ASSESSMENT — PAIN - FUNCTIONAL ASSESSMENT: PAIN_FUNCTIONAL_ASSESSMENT: NONE - DENIES PAIN

## 2023-09-19 ASSESSMENT — LIFESTYLE VARIABLES
HOW OFTEN DO YOU HAVE A DRINK CONTAINING ALCOHOL: NEVER
HOW MANY STANDARD DRINKS CONTAINING ALCOHOL DO YOU HAVE ON A TYPICAL DAY: PATIENT DOES NOT DRINK

## 2023-09-20 VITALS
DIASTOLIC BLOOD PRESSURE: 81 MMHG | RESPIRATION RATE: 16 BRPM | TEMPERATURE: 97.9 F | HEART RATE: 83 BPM | OXYGEN SATURATION: 100 % | BODY MASS INDEX: 18.18 KG/M2 | SYSTOLIC BLOOD PRESSURE: 166 MMHG | HEIGHT: 62 IN | WEIGHT: 98.8 LBS

## 2023-09-20 PROBLEM — N39.0 URINARY TRACT INFECTION WITHOUT HEMATURIA: Status: ACTIVE | Noted: 2023-09-20

## 2023-09-20 PROBLEM — C25.9 METASTASIS FROM PANCREATIC CANCER (HCC): Status: ACTIVE | Noted: 2023-09-20

## 2023-09-20 PROBLEM — C25.9 PANCREATIC CANCER (HCC): Status: ACTIVE | Noted: 2023-09-20

## 2023-09-20 PROBLEM — C79.9 METASTASIS FROM PANCREATIC CANCER (HCC): Status: ACTIVE | Noted: 2023-09-20

## 2023-09-20 PROBLEM — R42 DIZZINESS: Status: ACTIVE | Noted: 2023-09-20

## 2023-09-20 LAB
ALBUMIN SERPL-MCNC: 3.4 G/DL (ref 3.4–5)
ALBUMIN/GLOB SERPL: 1.2 {RATIO} (ref 1.1–2.2)
ALP SERPL-CCNC: 233 U/L (ref 40–129)
ALT SERPL-CCNC: 109 U/L (ref 10–40)
ANION GAP SERPL CALCULATED.3IONS-SCNC: 9 MMOL/L (ref 3–16)
AST SERPL-CCNC: 275 U/L (ref 15–37)
BACTERIA UR CULT: NORMAL
BASOPHILS # BLD: 0 K/UL (ref 0–0.2)
BASOPHILS NFR BLD: 0.6 %
BILIRUB SERPL-MCNC: 0.7 MG/DL (ref 0–1)
BUN SERPL-MCNC: 12 MG/DL (ref 7–20)
CALCIUM SERPL-MCNC: 8.9 MG/DL (ref 8.3–10.6)
CHLORIDE SERPL-SCNC: 101 MMOL/L (ref 99–110)
CO2 SERPL-SCNC: 25 MMOL/L (ref 21–32)
CREAT SERPL-MCNC: <0.5 MG/DL (ref 0.6–1.2)
DEPRECATED RDW RBC AUTO: 13.8 % (ref 12.4–15.4)
EKG ATRIAL RATE: 87 BPM
EKG DIAGNOSIS: NORMAL
EKG P AXIS: 65 DEGREES
EKG P-R INTERVAL: 114 MS
EKG Q-T INTERVAL: 364 MS
EKG QRS DURATION: 66 MS
EKG QTC CALCULATION (BAZETT): 438 MS
EKG R AXIS: 46 DEGREES
EKG T AXIS: 63 DEGREES
EKG VENTRICULAR RATE: 87 BPM
EOSINOPHIL # BLD: 0 K/UL (ref 0–0.6)
EOSINOPHIL NFR BLD: 0.6 %
GFR SERPLBLD CREATININE-BSD FMLA CKD-EPI: >60 ML/MIN/{1.73_M2}
GLUCOSE BLD-MCNC: 161 MG/DL (ref 70–99)
GLUCOSE BLD-MCNC: 177 MG/DL (ref 70–99)
GLUCOSE SERPL-MCNC: 174 MG/DL (ref 70–99)
HCT VFR BLD AUTO: 29.1 % (ref 36–48)
HGB BLD-MCNC: 9.9 G/DL (ref 12–16)
INR PPP: 1.16 (ref 0.84–1.16)
LIPASE SERPL-CCNC: 5 U/L (ref 13–60)
LYMPHOCYTES # BLD: 1.2 K/UL (ref 1–5.1)
LYMPHOCYTES NFR BLD: 18.4 %
MAGNESIUM SERPL-MCNC: 2 MG/DL (ref 1.8–2.4)
MCH RBC QN AUTO: 31 PG (ref 26–34)
MCHC RBC AUTO-ENTMCNC: 33.8 G/DL (ref 31–36)
MCV RBC AUTO: 91.8 FL (ref 80–100)
MONOCYTES # BLD: 0.7 K/UL (ref 0–1.3)
MONOCYTES NFR BLD: 10.3 %
NEUTROPHILS # BLD: 4.7 K/UL (ref 1.7–7.7)
NEUTROPHILS NFR BLD: 70.1 %
PERFORMED ON: ABNORMAL
PERFORMED ON: ABNORMAL
PLATELET # BLD AUTO: 236 K/UL (ref 135–450)
PMV BLD AUTO: 7.4 FL (ref 5–10.5)
POTASSIUM SERPL-SCNC: 3.4 MMOL/L (ref 3.5–5.1)
PROT SERPL-MCNC: 6.2 G/DL (ref 6.4–8.2)
PROTHROMBIN TIME: 14.8 SEC (ref 11.5–14.8)
RBC # BLD AUTO: 3.17 M/UL (ref 4–5.2)
SODIUM SERPL-SCNC: 135 MMOL/L (ref 136–145)
WBC # BLD AUTO: 6.7 K/UL (ref 4–11)

## 2023-09-20 PROCEDURE — 80053 COMPREHEN METABOLIC PANEL: CPT

## 2023-09-20 PROCEDURE — 2580000003 HC RX 258: Performed by: STUDENT IN AN ORGANIZED HEALTH CARE EDUCATION/TRAINING PROGRAM

## 2023-09-20 PROCEDURE — 85025 COMPLETE CBC W/AUTO DIFF WBC: CPT

## 2023-09-20 PROCEDURE — 6360000002 HC RX W HCPCS: Performed by: STUDENT IN AN ORGANIZED HEALTH CARE EDUCATION/TRAINING PROGRAM

## 2023-09-20 PROCEDURE — 83036 HEMOGLOBIN GLYCOSYLATED A1C: CPT

## 2023-09-20 PROCEDURE — 6370000000 HC RX 637 (ALT 250 FOR IP)

## 2023-09-20 PROCEDURE — 83735 ASSAY OF MAGNESIUM: CPT

## 2023-09-20 PROCEDURE — 6370000000 HC RX 637 (ALT 250 FOR IP): Performed by: STUDENT IN AN ORGANIZED HEALTH CARE EDUCATION/TRAINING PROGRAM

## 2023-09-20 PROCEDURE — 85610 PROTHROMBIN TIME: CPT

## 2023-09-20 PROCEDURE — 97165 OT EVAL LOW COMPLEX 30 MIN: CPT

## 2023-09-20 PROCEDURE — 93010 ELECTROCARDIOGRAM REPORT: CPT | Performed by: INTERNAL MEDICINE

## 2023-09-20 PROCEDURE — G0378 HOSPITAL OBSERVATION PER HR: HCPCS

## 2023-09-20 PROCEDURE — 83690 ASSAY OF LIPASE: CPT

## 2023-09-20 PROCEDURE — 36415 COLL VENOUS BLD VENIPUNCTURE: CPT

## 2023-09-20 PROCEDURE — 97535 SELF CARE MNGMENT TRAINING: CPT

## 2023-09-20 RX ORDER — FERROUS SULFATE 325(65) MG
325 TABLET ORAL DAILY
Status: DISCONTINUED | OUTPATIENT
Start: 2023-09-20 | End: 2023-09-20 | Stop reason: HOSPADM

## 2023-09-20 RX ORDER — MAGNESIUM SULFATE IN WATER 40 MG/ML
2000 INJECTION, SOLUTION INTRAVENOUS PRN
Status: DISCONTINUED | OUTPATIENT
Start: 2023-09-20 | End: 2023-09-20 | Stop reason: HOSPADM

## 2023-09-20 RX ORDER — PANTOPRAZOLE SODIUM 40 MG/1
40 TABLET, DELAYED RELEASE ORAL
Status: DISCONTINUED | OUTPATIENT
Start: 2023-09-20 | End: 2023-09-20 | Stop reason: HOSPADM

## 2023-09-20 RX ORDER — GLIPIZIDE 5 MG/1
5 TABLET, FILM COATED, EXTENDED RELEASE ORAL DAILY
Status: ON HOLD | COMMUNITY
End: 2023-09-20 | Stop reason: ALTCHOICE

## 2023-09-20 RX ORDER — DEXTROSE MONOHYDRATE 100 MG/ML
INJECTION, SOLUTION INTRAVENOUS CONTINUOUS PRN
Status: DISCONTINUED | OUTPATIENT
Start: 2023-09-20 | End: 2023-09-20 | Stop reason: HOSPADM

## 2023-09-20 RX ORDER — POLYETHYLENE GLYCOL 3350 17 G/17G
17 POWDER, FOR SOLUTION ORAL DAILY PRN
Status: DISCONTINUED | OUTPATIENT
Start: 2023-09-20 | End: 2023-09-20 | Stop reason: HOSPADM

## 2023-09-20 RX ORDER — AMLODIPINE BESYLATE 5 MG/1
5 TABLET ORAL DAILY
Status: DISCONTINUED | OUTPATIENT
Start: 2023-09-20 | End: 2023-09-20 | Stop reason: HOSPADM

## 2023-09-20 RX ORDER — SODIUM CHLORIDE 0.9 % (FLUSH) 0.9 %
5-40 SYRINGE (ML) INJECTION PRN
Status: DISCONTINUED | OUTPATIENT
Start: 2023-09-20 | End: 2023-09-20 | Stop reason: HOSPADM

## 2023-09-20 RX ORDER — GLIPIZIDE 5 MG/1
5 TABLET ORAL
Status: DISCONTINUED | OUTPATIENT
Start: 2023-09-21 | End: 2023-09-20 | Stop reason: HOSPADM

## 2023-09-20 RX ORDER — FERROUS SULFATE 325(65) MG
325 TABLET ORAL
Status: ON HOLD | COMMUNITY
End: 2023-09-20 | Stop reason: ALTCHOICE

## 2023-09-20 RX ORDER — ACETAMINOPHEN 650 MG/1
650 SUPPOSITORY RECTAL EVERY 6 HOURS PRN
Status: DISCONTINUED | OUTPATIENT
Start: 2023-09-20 | End: 2023-09-20 | Stop reason: HOSPADM

## 2023-09-20 RX ORDER — SODIUM CHLORIDE 0.9 % (FLUSH) 0.9 %
5-40 SYRINGE (ML) INJECTION EVERY 12 HOURS SCHEDULED
Status: DISCONTINUED | OUTPATIENT
Start: 2023-09-20 | End: 2023-09-20 | Stop reason: HOSPADM

## 2023-09-20 RX ORDER — INSULIN LISPRO 100 [IU]/ML
0-4 INJECTION, SOLUTION INTRAVENOUS; SUBCUTANEOUS NIGHTLY
Status: DISCONTINUED | OUTPATIENT
Start: 2023-09-20 | End: 2023-09-20 | Stop reason: HOSPADM

## 2023-09-20 RX ORDER — SODIUM CHLORIDE 9 MG/ML
INJECTION, SOLUTION INTRAVENOUS PRN
Status: DISCONTINUED | OUTPATIENT
Start: 2023-09-20 | End: 2023-09-20 | Stop reason: HOSPADM

## 2023-09-20 RX ORDER — ONDANSETRON 2 MG/ML
4 INJECTION INTRAMUSCULAR; INTRAVENOUS EVERY 6 HOURS PRN
Status: DISCONTINUED | OUTPATIENT
Start: 2023-09-20 | End: 2023-09-20 | Stop reason: HOSPADM

## 2023-09-20 RX ORDER — POTASSIUM CHLORIDE 20 MEQ/1
40 TABLET, EXTENDED RELEASE ORAL ONCE
Status: COMPLETED | OUTPATIENT
Start: 2023-09-20 | End: 2023-09-20

## 2023-09-20 RX ORDER — FLUTICASONE PROPIONATE 50 MCG
2 SPRAY, SUSPENSION (ML) NASAL DAILY PRN
COMMUNITY

## 2023-09-20 RX ORDER — SODIUM CHLORIDE, SODIUM LACTATE, POTASSIUM CHLORIDE, CALCIUM CHLORIDE 600; 310; 30; 20 MG/100ML; MG/100ML; MG/100ML; MG/100ML
INJECTION, SOLUTION INTRAVENOUS CONTINUOUS
Status: ACTIVE | OUTPATIENT
Start: 2023-09-20 | End: 2023-09-20

## 2023-09-20 RX ORDER — GLIPIZIDE 5 MG/1
5 TABLET ORAL
COMMUNITY

## 2023-09-20 RX ORDER — ACETAMINOPHEN 325 MG/1
650 TABLET ORAL EVERY 6 HOURS PRN
Status: DISCONTINUED | OUTPATIENT
Start: 2023-09-20 | End: 2023-09-20 | Stop reason: HOSPADM

## 2023-09-20 RX ORDER — ONDANSETRON 4 MG/1
4 TABLET, ORALLY DISINTEGRATING ORAL EVERY 8 HOURS PRN
Status: DISCONTINUED | OUTPATIENT
Start: 2023-09-20 | End: 2023-09-20 | Stop reason: HOSPADM

## 2023-09-20 RX ORDER — METHYLDOPA 500 MG
160 TABLET ORAL DAILY
COMMUNITY

## 2023-09-20 RX ORDER — POTASSIUM CHLORIDE 7.45 MG/ML
10 INJECTION INTRAVENOUS PRN
Status: DISCONTINUED | OUTPATIENT
Start: 2023-09-20 | End: 2023-09-20 | Stop reason: HOSPADM

## 2023-09-20 RX ORDER — INSULIN LISPRO 100 [IU]/ML
0-4 INJECTION, SOLUTION INTRAVENOUS; SUBCUTANEOUS
Status: DISCONTINUED | OUTPATIENT
Start: 2023-09-20 | End: 2023-09-20 | Stop reason: HOSPADM

## 2023-09-20 RX ADMIN — FERROUS SULFATE TAB 325 MG (65 MG ELEMENTAL FE) 325 MG: 325 (65 FE) TAB at 10:19

## 2023-09-20 RX ADMIN — SODIUM CHLORIDE, POTASSIUM CHLORIDE, SODIUM LACTATE AND CALCIUM CHLORIDE: 600; 310; 30; 20 INJECTION, SOLUTION INTRAVENOUS at 02:53

## 2023-09-20 RX ADMIN — APIXABAN 2.5 MG: 5 TABLET, FILM COATED ORAL at 09:23

## 2023-09-20 RX ADMIN — PANTOPRAZOLE SODIUM 40 MG: 40 TABLET, DELAYED RELEASE ORAL at 05:30

## 2023-09-20 RX ADMIN — CEFTRIAXONE SODIUM 1000 MG: 1 INJECTION, POWDER, FOR SOLUTION INTRAMUSCULAR; INTRAVENOUS at 00:55

## 2023-09-20 RX ADMIN — METOPROLOL TARTRATE 25 MG: 25 TABLET, FILM COATED ORAL at 03:22

## 2023-09-20 RX ADMIN — POTASSIUM CHLORIDE 40 MEQ: 1500 TABLET, EXTENDED RELEASE ORAL at 10:19

## 2023-09-20 RX ADMIN — PANCRELIPASE LIPASE, PANCRELIPASE PROTEASE, PANCRELIPASE AMYLASE 10000 UNITS: 5000; 17000; 24000 CAPSULE, DELAYED RELEASE ORAL at 09:24

## 2023-09-20 RX ADMIN — AMLODIPINE BESYLATE 5 MG: 5 TABLET ORAL at 09:23

## 2023-09-20 RX ADMIN — PANCRELIPASE LIPASE, PANCRELIPASE PROTEASE, PANCRELIPASE AMYLASE 40000 UNITS: 20000; 63000; 84000 CAPSULE, DELAYED RELEASE ORAL at 09:23

## 2023-09-20 RX ADMIN — METOPROLOL TARTRATE 25 MG: 25 TABLET, FILM COATED ORAL at 09:23

## 2023-09-20 NOTE — ACP (ADVANCE CARE PLANNING)
Advance Care Planning     General Advance Care Planning (ACP) Conversation    Date of Conversation: 9/19/2023  Conducted with: Patient with Decision Making Capacity    Healthcare Decision Maker:    Primary Decision Maker: Jose Osei - Spouse - [phone]    Secondary Decision Maker: Montserrat Davis - Child - [phone]  Click here to complete Healthcare Decision Makers including selection of the Healthcare Decision Maker Relationship (ie \"Primary\"). Today we documented Decision Maker(s) consistent with Legal Next of Kin hierarchy.     Content/Action Overview:  DECLINED ACP Conversation - will revisit periodically  Reviewed DNR/DNI and patient elects Full Code (Attempt Resuscitation)        Length of Voluntary ACP Conversation in minutes:  <16 minutes (Non-Billable)    Vicki Sánchez

## 2023-09-20 NOTE — ED NOTES
2001  12 lead ECG completed, given to Dr. Brad Palomo for review     [facility]  09/19/23 2002

## 2023-09-20 NOTE — CONSULTS
Oncology Hematology Care    Consult Note      Requesting Physician:  Dr. Curtis Dao:  abdominal pain      HISTORY OF PRESENT ILLNESS:    Ms. Liza Pierre  is a 82 y.o. female we are seeing in consultation for pancreatic cancer. She gets her oncology care at [facility]. She was diagnosed in 2022 and is s/p Whipple at [facility]. She had T1N1 disease but declined any additional chemotherapy. She also had uterine cancer in 2011 and is s/p CAROLINE/BSO. When last seen by her oncologist at [facility] 4/2023, her CA 19-9 had increased to 69 but scans showed no disease progression. She is now admitted to [facility] with abdominal pain and poor po intake. CT scan shows concern for peritoneal carcinomatosis. ICD-10-CM    1. Dizziness  R42       2. Anorexia  R63.0       3. Malignant neoplasm of pancreas, unspecified location of malignancy ([address])  C25.9       4.  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    history of stage I-A, grade 1, uterine cancer with myometrial invasion 2 mm out of 19 mm with no cervical involvement, no lymphvascular space invasion, and all pelvic lymph nodes are negative    GERD (gastroesophageal reflux disease)     Hyperlipidemia     Hypertension     Kidney stone 7/31/2023    Nausea     PAF (paroxysmal atrial fibrillation) (HCC)     Pancreas divisum     Right carpal tunnel syndrome 09/07/2016    Type II or unspecified type diabetes mellitus without mention of complication, not stated as uncontrolled        Past Surgical History:  Past Surgical History:   Procedure Laterality Date    BLADDER SURGERY      tuck    BREAST BIOPSY      CARPAL TUNNEL RELEASE      LEFT HAND    CARPAL TUNNEL RELEASE Right

## 2023-09-20 NOTE — PALLIATIVE CARE
Palliative Care Initial Note  Palliative Care Admit date:09/20/2023    Advance Directives:Full Code    Plan of care/goals:Reviewed chart. Pt with pmh of :    Patient Active Problem List   Diagnosis    Diabetes mellitus without complication (HCC)    Hypertension    Hyperlipidemia    Post-menopausal bleeding    Endometrioid adenocarcinoma    Acid reflux    History of uterine cancer    Osteopenia    Closed displaced fracture of lateral malleolus of right fibula    Right ankle pain    Right carpal tunnel syndrome    Foot callus    Hammer toe of left foot    Acute pain of left knee    Syncope and collapse    Atrial fibrillation with RVR ([address])    COVID-19    History of UTI    History of COVID-19    Kidney stone    Ureterolithiasis    Elevated lactic acid level    Nausea and vomiting    Hydronephrosis with ureteropelvic junction (UPJ) obstruction    Abdominal pain    Dizziness    Metastasis from pancreatic cancer ([address])    Urinary tract infection without hematuria    Pancreatic cancer ([address])       Met with the pt and spouse at bedside to discuss goals of care and palliative/hospice options. Pt states would like to follow up with  as out-pt to continue with chemotherapy. Hospice list provided to the pt and pt is considering meeting with hospice nurse as out-pt for informational meeting related to pain management. Pt aware that she would not be able to have chemo tx if actively enrolled with hospice services. Social/Spiritual: Prayer Support    Plan: Home with spouse and follow up with  to discuss chemo options. Pt open to meeting with hospice nurse at home for informational meeting. JULIANNA Lora aware of above POC and will arrange discharge to home today.     Reason for consult:    _x__ Advance Care Planning  _x__ Transition of Care Planning  _x__ Psychosocial/Spiritual Support  _x__ Symptom Management    Yessica Zavaleta RN Palliative Care

## 2023-09-20 NOTE — H&P
History and Physical      Name:  Ruth Murray /Age/Sex: 1940  (82 y.o. female)   MRN & CSN:  [id number] & [id number] Encounter Date/Time: 2023 11:38 PM EDT   Location:   PCP: Dilip Sanabria MD       Hospital Day: 1    Assessment and Plan:     Patient is a 82-year-old female who presented with weakness. # Presyncope secondary to intravascular volume depletion  # Hypokalemia and hypomagnesemia  - Endorsed minimal PO intake for the past week due to loss of appetite, and feeling lightheaded when standing. No dysphagia, odynophagia, constipation or diarrhea. - Clinically hypovolemic. Continue IVF. # Acute cystitis with hematuria  - Endorsed some dysuria. Hx of nephrolithiasis. - CT showed mild bilateral hydronephrosis, nonobstructive calculi. Started on Rocephin in the ED, continue.  - Follow-up cultures. # Pancreatic cancer s/p Whipple's procedure in   # Peritoneal carcinomatosis  - CT in 2023 showing ill-defined soft tissue mass at posterior resection site suspected to be residual/recurrent malignancy. Has not followed up with Oncology since, scheduled for follow-up appointment in 2023.  - Repeat CT in the ED showing mild increase size of multiple peritoneal nodules related to carcinomatosis. - Will consult Oncology for evaluation. # Hx of uterine cancer s/p total hysterectomy with BSO in     # PAF  - Continue Eliquis and Lopressor. # Essential hypretension  - Continue Norvasc. # GERD  - Continue PPI. Checklist:  Advanced directive: full  Diet: cardiac  DVT ppx: Eliquis     Disposition: admit to inpatient. Estimated discharge: 2-3 day(s). Current living situation: home. Expected disposition: home. Spoke with ED provider who recommended admission for the patient and I agree with that plan. Personally reviewed lab studies and imaging. EKG interpreted personally and results as stated above.   Imaging that was interpreted personally and results as

## 2023-09-20 NOTE — DISCHARGE INSTR - DIET

## 2023-09-20 NOTE — DISCHARGE INSTR - COC
Continuity of Care Form    Patient Name: Suad Tavarez   :  1940  MRN:  [id number]    [address] [place] [address] date:  2023  Discharge date:  ***    Code Status Order: Full Code   Advance Directives:     Admitting Physician:  Radha Cook MD  PCP: Phillip Aguilar MD    Discharging Nurse: [facility] Unit/Room#: 0211/0211-02  Discharging Unit Phone Number: ***    Emergency Contact:   Extended Emergency Contact Information  Primary Emergency Contact: Mark Torre  Address: [address] [place] [address] [place] Phone: [phone]  Mobile Phone: [phone]  Relation: Spouse  Secondary Emergency Contact: Davis Correia  Mobile Phone: [phone]  Relation: Child    Past Surgical History:  Past Surgical History:   Procedure Laterality Date    BLADDER SURGERY      tuck    BREAST BIOPSY      CARPAL TUNNEL RELEASE      LEFT HAND    CARPAL TUNNEL RELEASE Right     CHOLECYSTECTOMY      CYSTOSCOPY N/A 2023    CYSTOSCOPY,  LEFT STENT PLACEMENT performed by Edu Cole MD at [address] [place] [address]    ERCP N/A 2022    ENDOSCOPIC RETROGRADE CHOLANGIOPANCREATOGRAPHY performed by Rio Westbrook MD at [facility] ([address] [place] [address])      OTHER SURGICAL HISTORY  2022    EGD W/EUS FNA    OTHER SURGICAL HISTORY Left 2023    CYSTOSCOPY,  LEFT STENT PLACEMENT    CAROLINE AND BSO (CERVIX REMOVED)      he patient underwent her staging procedure consisting of a hysterectomy, bilateral salpingo-oophorectomy, and bilateral pelvic lymph node dissection in 2011 under the direction of Dr. Arabella Salazar. The patient followed with Dr. Arabella Salazar until he moved to another location outside of the [facility].   The patient has had no evidence of disease to date of 2014    UPPER GASTROINTESTINAL ENDOSCOPY N/A 2022    EGD W/EUS FNA performed by Marcie Crawford MD at [address] [place] [address]       Immunization History:

## 2023-09-20 NOTE — DISCHARGE SUMMARY
Continue taking this medication, and follow the directions you see here. metFORMIN 500 MG tablet  Commonly known as: GLUCOPHAGE  What changed: Another medication with the same name was removed. Continue taking this medication, and follow the directions you see here. CONTINUE taking these medications      acyclovir 400 MG tablet  Commonly known as: ZOVIRAX     amLODIPine 5 MG tablet  Commonly known as: NORVASC  TAKE 1 TABLET BY MOUTH EVERY DAY     atorvastatin 10 MG tablet  Commonly known as: LIPITOR  Take 1 tablet by mouth daily     b complex vitamins capsule     calcium carbonate 500 MG Tabs tablet  Commonly known as: OSCAL     CoQ10 100 MG Caps     Eliquis 2.5 MG Tabs tablet  Generic drug: apixaban  TAKE 1 TABLET BY MOUTH TWICE A DAY     lipase-protease-amylase 54029-54098 units delayed release capsule  Commonly known as: CREON     metoprolol tartrate 25 MG tablet  Commonly known as: LOPRESSOR  TAKE 1 TABLET BY MOUTH TWICE A DAY     omega-3 fish oil 1000 MG Caps     omeprazole 40 MG delayed release capsule  Commonly known as: PRILOSEC     OSTEO BI-FLEX ADV DOUBLE ST PO     oxybutynin 5 MG tablet  Commonly known as: DITROPAN  TAKE 1 TABLET BY MOUTH 2 TIMES DAILY     PRESERVISION AREDS 2 PO     PREVAGEN PO     Slow Release Iron 160 (50 Fe) MG Tbcr extended release tablet  Generic drug: ferrous sulfate dried     vitamin D 1000 UNIT Tabs tablet  Commonly known as: CHOLECALCIFEROL            STOP taking these medications      ferrous sulfate 325 (65 Fe) MG tablet  Commonly known as: IRON 325     PROBIOTIC ADVANCED PO                Discharged in stable condition to home     Follow Up: Follow up with PCP in 1 week and oncologist    JOHN Novak

## 2023-09-20 NOTE — CARE COORDINATION
Case Management Assessment  Initial Evaluation    Date/Time of Evaluation: 9/20/2023 10:44 AM  Assessment Completed by: Maci Benitez    If patient is discharged prior to next notation, then this note serves as note for discharge by case management. Patient Name: Ronak Matamoros                   YOB: 1940  Diagnosis: Anorexia [R63.0]  Dizziness [R42]  Abdominal pain [R10.9]  Malignant neoplasm of pancreas, unspecified location of malignancy ([address]) [C25.9]  Urinary tract infection without hematuria, site unspecified [N39.0]                   Date / Time: 9/19/2023  7:44 PM    Patient Admission Status: Inpatient   Readmission Risk (Low < 19, Mod (19-27), High > 27): Readmission Risk Score: 13.2    Current PCP: Candice Mart MD  PCP verified by CM? Yes (Lisa)    Chart Reviewed: Yes      History Provided by: Patient  Patient Orientation: Alert and Oriented    Patient Cognition: Alert    Hospitalization in the last 30 days (Readmission):  No    If yes, Readmission Assessment in CM Navigator will be completed. Advance Directives:      Code Status: Full Code   Patient's Primary Decision Maker is: Patient Declined (Legal Next of Kin Remains as Decision Maker)    Primary Decision Maker: Angela Mcknight Spouse - [phone]    Secondary Decision Maker: Srinivasan Villanuevaf - Child - [phone]    Discharge Planning:    Patient lives with: Spouse/Significant Other Type of Home: House  Primary Care Giver: Self  Patient Support Systems include: Spouse/Significant Other   Current Financial resources: Medicare  Current community resources: None  Current services prior to admission: None            Current DME:              Type of Home Care services:  None    ADLS  Prior functional level: Independent in ADLs/IADLs  Current functional level: Independent in ADLs/IADLs    PT AM-PAC:   /24  OT AM-PAC:   /24    Family can provide assistance at DC:  Yes  Would you like Case Management to discuss the

## 2023-09-20 NOTE — ED PROVIDER NOTES
pancreatic cancer as well as urinary tract infection. Problems Addressed:  Anorexia: acute illness or injury  Dizziness: acute illness or injury  Malignant neoplasm of pancreas, unspecified location of malignancy [facility]): chronic illness or injury  Urinary tract infection without hematuria, site unspecified: acute illness or injury    Amount and/or Complexity of Data Reviewed  Labs: ordered. Decision-making details documented in ED Course. Radiology: ordered. Decision-making details documented in ED Course. ECG/medicine tests: ordered. Decision-making details documented in ED Course. Risk  Prescription drug management. Decision regarding hospitalization. The patient was given the followingmedications:  Orders Placed This Encounter   Medications    lactated ringers bolus bolus 1,000 mL    iopamidol (ISOVUE-370) 76 % injection 75 mL    magnesium sulfate 2000 mg in 50 mL IVPB premix    potassium bicarb-citric acid (EFFER-K) effervescent tablet 40 mEq    cefTRIAXone (ROCEPHIN) 1,000 mg in sodium chloride 0.9 % 50 mL IVPB (mini-bag)     Order Specific Question:   Antimicrobial Indications     Answer:   Urinary Tract Infection       CONSULTS:  None      CRITICAL CARE TIME   Total Critical Care time was 0 minutes, excluding separately reportable procedures in the context of the following condition na. There was a high probability of clinically significant/life threatening deterioration in the patient's condition which required my urgent intervention. Clinical Impression     1. Dizziness    2. Anorexia    3. Malignant neoplasm of pancreas, unspecified location of malignancy ([address])    4. Urinary tract infection without hematuria, site unspecified        Disposition     PATIENT REFERRED TO:  No follow-up provider specified.     DISCHARGE MEDICATIONS:  New Prescriptions    No medications on file       DISPOSITION Decision To Admit 09/19/2023 11:15:27 PM      Enedina Zarate MD (electronically signed)  Attending

## 2023-09-21 LAB
EST. AVERAGE GLUCOSE BLD GHB EST-MCNC: 159.9 MG/DL
HBA1C MFR BLD: 7.2 %

## 2023-09-23 LAB — CANCER AG19-9 SERPL IA-ACNC: 780 U/ML (ref 0–35)

## 2023-09-26 ENCOUNTER — HOSPITAL ENCOUNTER (OUTPATIENT)
Age: 83
Discharge: HOME | End: 2023-09-26
Payer: MEDICARE

## 2023-09-26 ENCOUNTER — HOSPITAL ENCOUNTER (OUTPATIENT)
Dept: GENERAL RADIOLOGY | Age: 83
Discharge: HOME | End: 2023-09-26
Payer: MEDICARE

## 2023-09-26 DIAGNOSIS — N20.0 CALCULUS OF KIDNEY: ICD-10-CM

## 2023-09-26 PROCEDURE — 74018 RADEX ABDOMEN 1 VIEW: CPT

## (undated) DEVICE — GLOVE ORANGE PI 8   MSG9080

## (undated) DEVICE — BASIC CYSTO I-LF: Brand: MEDLINE INDUSTRIES, INC.

## (undated) DEVICE — ENDOSCOPIC KIT 2 12 FT OP4 DE2 GWN SYR

## (undated) DEVICE — GUIDEWIRE VASC NITINOL HYDROPHIL STR 3 CM 0.035 INX150 CM STD NIT ZIPWIRE

## (undated) DEVICE — TUBING, SUCTION, 3/16" X 10', STRAIGHT: Brand: MEDLINE

## (undated) DEVICE — INVIEW CLEAR LEGGINGS: Brand: CONVERTORS

## (undated) DEVICE — BOWL 32OZ-LF: Brand: MEDLINE INDUSTRIES, INC.

## (undated) DEVICE — ELECTRODE ECG MONITR FOAM TEAR DROP ADLT RED

## (undated) DEVICE — SOLUTION IRRIGATION STRL H2O 1000 ML UROMATIC CONTAINER

## (undated) DEVICE — SPHINCTEROTOME: Brand: JAGTOME RX 44

## (undated) DEVICE — ENDO CARRY-ON PROCEDURE KIT INCLUDES SUCTION TUBING, LUBRICANT, GAUZE, BIOHAZARD STICKER, TRANSPORT PAD AND INTERCEPT BEDSIDE KIT.: Brand: ENDO CARRY-ON PROCEDURE KIT

## (undated) DEVICE — BAG URIN STRL FOR URO CTCH SYS

## (undated) DEVICE — SYRINGE LL 10CC

## (undated) DEVICE — MEDIA CONTRAST OPTIRAY 320 50ML VIAL

## (undated) DEVICE — ELECTRODE PT RET AD L9FT HI MOIST COND ADH HYDRGEL CORDED

## (undated) DEVICE — CIRCUIT ANES L72IN 3L BACT AND VIR FLTR EL CONN SGL LIMB

## (undated) DEVICE — CONMED SCOPE SAVER BITE BLOCK, 20X27 MM: Brand: SCOPE SAVER

## (undated) DEVICE — SYRINGE MED 10ML LUERLOCK TIP W/O SFTY DISP

## (undated) DEVICE — PREP SOL PVP IODINE 4%  4 OZ/BTL

## (undated) DEVICE — GOWN SIRUS NONREIN LG W/TWL: Brand: MEDLINE INDUSTRIES, INC.

## (undated) DEVICE — COVER,TABLE,44X90,STERILE: Brand: MEDLINE

## (undated) DEVICE — SOLUTION IV IRRIG 500ML 0.9% SODIUM CHL 2F7123

## (undated) DEVICE — BOWL MED L 32OZ PLAS W/ MOLD GRAD EZ OPN PEEL PCH

## (undated) DEVICE — SYSTEM BX CAP BILI RAP EXCHG CAP LOK DEV COMPATIBLE W/ OLY

## (undated) DEVICE — ENDOSCOPIC ULTRASOUND FINE NEEDLE BIOPSY (FNB) DEVICE: Brand: ACQUIRE

## (undated) DEVICE — STANDARD HYPODERMIC NEEDLE,POLYPROPYLENE HUB: Brand: MONOJECT